# Patient Record
Sex: FEMALE | Race: BLACK OR AFRICAN AMERICAN | NOT HISPANIC OR LATINO | Employment: FULL TIME | URBAN - METROPOLITAN AREA
[De-identification: names, ages, dates, MRNs, and addresses within clinical notes are randomized per-mention and may not be internally consistent; named-entity substitution may affect disease eponyms.]

---

## 2017-01-30 ENCOUNTER — OFFICE VISIT (OUTPATIENT)
Dept: FAMILY MEDICINE | Facility: CLINIC | Age: 42
End: 2017-01-30
Payer: COMMERCIAL

## 2017-01-30 VITALS
BODY MASS INDEX: 40.04 KG/M2 | TEMPERATURE: 99 F | WEIGHT: 226 LBS | SYSTOLIC BLOOD PRESSURE: 120 MMHG | OXYGEN SATURATION: 98 % | DIASTOLIC BLOOD PRESSURE: 84 MMHG | HEART RATE: 88 BPM | HEIGHT: 63 IN | RESPIRATION RATE: 18 BRPM

## 2017-01-30 DIAGNOSIS — H00.011 HORDEOLUM EXTERNUM OF RIGHT UPPER EYELID: ICD-10-CM

## 2017-01-30 DIAGNOSIS — J30.9 ALLERGIC RHINITIS, CAUSE UNSPECIFIED: Primary | ICD-10-CM

## 2017-01-30 PROCEDURE — 99213 OFFICE O/P EST LOW 20 MIN: CPT | Mod: 25,S$GLB,, | Performed by: REGISTERED NURSE

## 2017-01-30 PROCEDURE — 3079F DIAST BP 80-89 MM HG: CPT | Mod: S$GLB,,, | Performed by: REGISTERED NURSE

## 2017-01-30 PROCEDURE — 99999 PR PBB SHADOW E&M-EST. PATIENT-LVL III: CPT | Mod: PBBFAC,,, | Performed by: REGISTERED NURSE

## 2017-01-30 PROCEDURE — 3074F SYST BP LT 130 MM HG: CPT | Mod: S$GLB,,, | Performed by: REGISTERED NURSE

## 2017-01-30 PROCEDURE — 1159F MED LIST DOCD IN RCRD: CPT | Mod: S$GLB,,, | Performed by: REGISTERED NURSE

## 2017-01-30 PROCEDURE — 96372 THER/PROPH/DIAG INJ SC/IM: CPT | Mod: S$GLB,,, | Performed by: REGISTERED NURSE

## 2017-01-30 RX ORDER — MONTELUKAST SODIUM 10 MG/1
10 TABLET ORAL NIGHTLY
Qty: 30 TABLET | Refills: 0 | Status: SHIPPED | OUTPATIENT
Start: 2017-01-30 | End: 2017-03-01

## 2017-01-30 RX ORDER — SULFACETAMIDE SODIUM 100 MG/ML
1 SOLUTION/ DROPS OPHTHALMIC
Qty: 15 ML | Refills: 0 | Status: SHIPPED | OUTPATIENT
Start: 2017-01-30 | End: 2020-03-31 | Stop reason: SDUPTHER

## 2017-01-30 RX ORDER — LEVOCETIRIZINE DIHYDROCHLORIDE 5 MG/1
5 TABLET, FILM COATED ORAL EVERY MORNING
Qty: 30 TABLET | Refills: 6 | Status: SHIPPED | OUTPATIENT
Start: 2017-01-30 | End: 2017-03-13

## 2017-01-30 RX ORDER — BETAMETHASONE SODIUM PHOSPHATE AND BETAMETHASONE ACETATE 3; 3 MG/ML; MG/ML
12 INJECTION, SUSPENSION INTRA-ARTICULAR; INTRALESIONAL; INTRAMUSCULAR; SOFT TISSUE
Status: COMPLETED | OUTPATIENT
Start: 2017-01-30 | End: 2017-01-30

## 2017-01-30 RX ADMIN — BETAMETHASONE SODIUM PHOSPHATE AND BETAMETHASONE ACETATE 12 MG: 3; 3 INJECTION, SUSPENSION INTRA-ARTICULAR; INTRALESIONAL; INTRAMUSCULAR; SOFT TISSUE at 04:01

## 2017-01-30 NOTE — MR AVS SNAPSHOT
[facility]  [address]  [place] [address]  Phone: [phone]                  Garret Jones   2017 3:45 PM   Office Visit    Description:  Female : 1975   Provider:  Jed Pinedo NP   Department:  [facility]           Reason for Visit     Nasal Congestion           Diagnoses this Visit        Comments    Allergic rhinitis, cause unspecified    -  Primary     Hordeolum externum of right upper eyelid                To Do List           Goals (5 Years of Data)     None       These Medications        Disp Refills Start End    levocetirizine (XYZAL) 5 MG tablet 30 tablet 6 2017    Take 1 tablet (5 mg total) by mouth every morning. - Oral    Pharmacy: [facility] [address] [place] [address] [place] [id number] [facility] 1 SO. Ph #: [phone]       montelukast (SINGULAIR) 10 mg tablet 30 tablet 0 2017 3/1/2017    Take 1 tablet (10 mg total) by mouth every evening. - Oral    Pharmacy: [facility] [address] [place] [address] [place] [id number] [facility] 1 SO. Ph #: [phone]       sulfacetamide sodium 10% (BLEPH-10) 10 % ophthalmic solution 15 mL 0 2017    Place 1 drop into both eyes every 3 (three) hours. - Both Eyes    Pharmacy: [address] [place] [id number] [facility] 1 SO. Ph #: [phone]         [facility] On Call     [facility] On Call Nurse Care Line -  Assistance  Registered nurses in the [facility] provide clinical advisement, health education, appointment booking, and other advisory services.  Call for this free service at [phone].             Medications           Message regarding Medications     Verify the changes and/or additions to your medication regime listed below are the same as discussed with your clinician today.  If any of these changes or additions are incorrect, please notify your healthcare provider.        START taking these NEW medications        Refills     montelukast (SINGULAIR) 10 mg tablet 0    Sig: Take 1 tablet (10 mg total) by mouth every evening.    Class: Normal    Route: Oral    sulfacetamide sodium 10% (BLEPH-10) 10 % ophthalmic solution 0    Sig: Place 1 drop into both eyes every 3 (three) hours.    Class: Normal    Route: Both Eyes      These medications were administered today        Dose Freq    betamethasone acetate-betamethasone sodium phosphate injection 12 mg 12 mg Clinic/HOD 1 time    Sig: Inject 2 mLs (12 mg total) into the muscle one time.    Class: Normal    Route: Intramuscular           Verify that the below list of medications is an accurate representation of the medications you are currently taking.  If none reported, the list may be blank. If incorrect, please contact your healthcare provider. Carry this list with you in case of emergency.           Current Medications     alprazolam (XANAX) 2 MG Tab Take 1 tablet (2 mg total) by mouth daily as needed for Anxiety.    benazepril-hydrochlorthiazide (LOTENSIN HCT) 10-12.5 mg tablet Take 1 tablet by mouth Daily.    canagliflozin (INVOKANA) 100 mg Tab Take 100 mg by mouth once daily.    ergocalciferol (ERGOCALCIFEROL) 50,000 unit Cap Take 50,000 Units by mouth every 7 days.    etodolac (LODINE) 400 MG tablet Take 1 tablet (400 mg total) by mouth 2 (two) times daily. As needed for pain with food    flunisolide 25 mcg, 0.025%, (NASALIDE) 25 mcg (0.025 %) Spry 2 sprays by Nasal route 2 (two) times daily.    ibuprofen (ADVIL,MOTRIN) 800 MG tablet Take 1 tablet (800 mg total) by mouth 3 (three) times daily with meals.    levocetirizine (XYZAL) 5 MG tablet Take 1 tablet (5 mg total) by mouth every morning.    levothyroxine (SYNTHROID) 100 MCG tablet Take 100 mcg by mouth once daily.    metaxalone (SKELAXIN) 800 MG tablet Take 1 tablet (800 mg total) by mouth 3 (three) times daily as needed for Pain. Caution on sedation    metoprolol succinate (TOPROL-XL) 50 MG 24 hr tablet Take 50 mg by mouth.     "sitagliptin (JANUVIA) 50 MG Tab Take 100 mg by mouth once daily.    montelukast (SINGULAIR) 10 mg tablet Take 1 tablet (10 mg total) by mouth every evening.    omeprazole (PRILOSEC) 40 MG capsule Take 1 capsule (40 mg total) by mouth every morning.    sulfacetamide sodium 10% (BLEPH-10) 10 % ophthalmic solution Place 1 drop into both eyes every 3 (three) hours.           Clinical Reference Information           Vital Signs - Last Recorded  Most recent update: 1/30/2017  3:47 PM by Latrice Graham MA    BP Pulse Temp Resp Ht Wt    120/84 88 98.8 °F (37.1 °C) (Tympanic) 18 5' 2.5" (1.588 m) 102.5 kg (225 lb 15.5 oz)    SpO2 BMI             98% 40.67 kg/m2         Blood Pressure          Most Recent Value    BP  120/84      Allergies as of 1/30/2017     Morphine    Amlodipine    Amoxicillin-pot Clavulanate    Codeine    Hydrocodone    Latex    Propoxyphene N-acetaminophen    Tuberculin Ppd      Immunizations Administered on Date of Encounter - 1/30/2017     None      "

## 2017-01-30 NOTE — PROGRESS NOTES
"Subjective:       Patient ID: Garret Jones is a 41 y.o. female.    Chief Complaint: Nasal Congestion    HPI     Garret is here today with c/o allergy issues over the past week.  Coricidin-HBP not helping.  Reports sweats, RN, NC, sneezing, PND, watery eyes and scratchy throat.  Reports stye to RT upper eyelid.    Review of Systems   Constitutional: Positive for diaphoresis. Negative for chills.   HENT: Positive for congestion, postnasal drip, rhinorrhea and sneezing. Negative for ear pain and sinus pressure.    Eyes: Positive for pain (stye to lid), discharge and itching. Negative for photophobia, redness and visual disturbance.   Respiratory: Negative.    Cardiovascular: Negative.    Allergic/Immunologic: Positive for environmental allergies.   Neurological: Negative.    Hematological: Negative for adenopathy.       Objective:         Vitals:    01/30/17 1544   BP: 120/84   Pulse: 88   Resp: 18   Temp: 98.8 °F (37.1 °C)   TempSrc: Tympanic   SpO2: 98%   Weight: 102.5 kg (225 lb 15.5 oz)   Height: 5' 2.5" (1.588 m)       Physical Exam   Constitutional: She is oriented to person, place, and time. She appears well-developed and well-nourished. No distress.   HENT:   Head: Normocephalic and atraumatic.   Right Ear: Tympanic membrane, external ear and ear canal normal.   Left Ear: Tympanic membrane, external ear and ear canal normal.   Nose: Mucosal edema and rhinorrhea (boggy, clear RN) present. Right sinus exhibits no maxillary sinus tenderness and no frontal sinus tenderness. Left sinus exhibits no maxillary sinus tenderness and no frontal sinus tenderness.   Mouth/Throat: No oropharyngeal exudate, posterior oropharyngeal edema or posterior oropharyngeal erythema (clear PND noted).   Eyes: EOM are normal. Pupils are equal, round, and reactive to light. Right eye exhibits discharge (clear B watery d/c, mild eye redness) and hordeolum (RT upper inner eyelid). Left eye exhibits discharge.   Neck: Normal range " of motion. Neck supple.   Cardiovascular: Normal rate, regular rhythm and normal heart sounds.    Pulmonary/Chest: Effort normal and breath sounds normal.   Lymphadenopathy:     She has no cervical adenopathy.   Neurological: She is alert and oriented to person, place, and time.   Skin: She is not diaphoretic.   Vitals reviewed.      Assessment:       1. Allergic rhinitis, cause unspecified    2. Hordeolum externum of right upper eyelid        Plan:       Garret was seen today for nasal congestion.    Diagnoses and all orders for this visit:    Allergic rhinitis, cause unspecified  -     betamethasone acetate-betamethasone sodium phosphate injection 12 mg; Inject 2 mLs (12 mg total) into the muscle one time.  -     levocetirizine (XYZAL) 5 MG tablet; Take 1 tablet (5 mg total) by mouth every morning.  -     montelukast (SINGULAIR) 10 mg tablet; Take 1 tablet (10 mg total) by mouth every evening.    Hordeolum externum of right upper eyelid  -     sulfacetamide sodium 10% (BLEPH-10) 10 % ophthalmic solution; Place 1 drop into both eyes every 3 (three) hours.      Follow-up in clinic as needed.

## 2017-03-13 ENCOUNTER — OFFICE VISIT (OUTPATIENT)
Dept: FAMILY MEDICINE | Facility: CLINIC | Age: 42
End: 2017-03-13
Payer: COMMERCIAL

## 2017-03-13 VITALS
HEIGHT: 62 IN | RESPIRATION RATE: 18 BRPM | TEMPERATURE: 98 F | SYSTOLIC BLOOD PRESSURE: 122 MMHG | BODY MASS INDEX: 42.07 KG/M2 | WEIGHT: 228.63 LBS | DIASTOLIC BLOOD PRESSURE: 80 MMHG | HEART RATE: 97 BPM

## 2017-03-13 DIAGNOSIS — J32.9 SINUSITIS, UNSPECIFIED CHRONICITY, UNSPECIFIED LOCATION: Primary | ICD-10-CM

## 2017-03-13 DIAGNOSIS — J30.9 ALLERGIC RHINITIS, CAUSE UNSPECIFIED: ICD-10-CM

## 2017-03-13 PROCEDURE — 99999 PR PBB SHADOW E&M-EST. PATIENT-LVL IV: CPT | Mod: PBBFAC,,, | Performed by: FAMILY MEDICINE

## 2017-03-13 PROCEDURE — 3074F SYST BP LT 130 MM HG: CPT | Mod: S$GLB,,, | Performed by: FAMILY MEDICINE

## 2017-03-13 PROCEDURE — 99214 OFFICE O/P EST MOD 30 MIN: CPT | Mod: S$GLB,,, | Performed by: FAMILY MEDICINE

## 2017-03-13 PROCEDURE — 1160F RVW MEDS BY RX/DR IN RCRD: CPT | Mod: S$GLB,,, | Performed by: FAMILY MEDICINE

## 2017-03-13 PROCEDURE — 3079F DIAST BP 80-89 MM HG: CPT | Mod: S$GLB,,, | Performed by: FAMILY MEDICINE

## 2017-03-13 RX ORDER — LEVOCETIRIZINE DIHYDROCHLORIDE 5 MG/1
5 TABLET, FILM COATED ORAL EVERY MORNING
Qty: 30 TABLET | Refills: 6 | Status: SHIPPED | OUTPATIENT
Start: 2017-03-13 | End: 2019-07-10

## 2017-03-13 RX ORDER — FLUTICASONE PROPIONATE 50 MCG
2 SPRAY, SUSPENSION (ML) NASAL DAILY
Qty: 16 G | Refills: 0 | Status: SHIPPED | OUTPATIENT
Start: 2017-03-13 | End: 2017-03-24

## 2017-03-13 RX ORDER — CLARITHROMYCIN 500 MG/1
500 TABLET, FILM COATED ORAL 2 TIMES DAILY
Qty: 20 TABLET | Refills: 0 | Status: SHIPPED | OUTPATIENT
Start: 2017-03-13 | End: 2017-03-23

## 2017-03-13 NOTE — PATIENT INSTRUCTIONS
Sinusitis (Antibiotic Treatment)    The sinuses are air-filled spaces within the bones of the face. They connect to the inside of the nose. Sinusitis is an inflammation of the tissue lining the sinus cavity. Sinus inflammation can occur during a cold. It can also be due to allergies to pollens and other particles in the air. Sinusitis can cause symptoms of sinus congestion and fullness. A sinus infection causes fever, headache and facial pain. There is often green or yellow drainage from the nose or into the back of the throat (post-nasal drip). You have been given antibiotics to treat this condition.  Home care:  · Take the full course of antibiotics as instructed. Do not stop taking them, even if you feel better.  · Drink plenty of water, hot tea, and other liquids. This may help thin mucus. It also may promote sinus drainage.  · Heat may help soothe painful areas of the face. Use a towel soaked in hot water. Or,  the shower and direct the hot spray onto your face. Using a vaporizer along with a menthol rub at night may also help.   · An expectorant containing guaifenesin may help thin the mucus and promote drainage from the sinuses.  · Over-the-counter decongestants may be used unless a similar medicine was prescribed. Nasal sprays work the fastest. Use one that contains phenylephrine or oxymetazoline. First blow the nose gently. Then use the spray. Do not use these medicines more often than directed on the label or symptoms may get worse. You may also use tablets containing pseudoephedrine. Avoid products that combine ingredients, because side effects may be increased. Read labels. You can also ask the pharmacist for help. (NOTE: Persons with high blood pressure should not use decongestants. They can raise blood pressure.)  · Over-the-counter antihistamines may help if allergies contributed to your sinusitis.    · Do not use nasal rinses or irrigation during an acute sinus infection, unless told to by  your health care provider. Rinsing may spread the infection to other sinuses.  · Use acetaminophen or ibuprofen to control pain, unless another pain medicine was prescribed. (If you have chronic liver or kidney disease or ever had a stomach ulcer, talk with your doctor before using these medicines. Aspirin should never be used in anyone under 18 years of age who is ill with a fever. It may cause severe liver damage.)  · Don't smoke. This can worsen symptoms.  Follow-up care  Follow up with your healthcare provider or our staff if you are not improving within the next week.  When to seek medical advice  Call your healthcare provider if any of these occur:  · Facial pain or headache becoming more severe  · Stiff neck  · Unusual drowsiness or confusion  · Swelling of the forehead or eyelids  · Vision problems, including blurred or double vision  · Fever of 100.4ºF (38ºC) or higher, or as directed by your healthcare provider  · Seizure  · Breathing problems  · Symptoms not resolving within 10 days  Date Last Reviewed: 4/13/2015  © 7806-9887 [employer]. [address] [place] [address], [place] [address]. All rights reserved. This information is not intended as a substitute for professional medical care. Always follow your healthcare professional's instructions.

## 2017-03-13 NOTE — PROGRESS NOTES
Subjective:       Patient ID: Garret Jones is a 41 y.o. female.    Chief Complaint: URI      HPI  Ms. Jones presents to clinic today for complaints of cold. She states this has been going on for almost 2 week. She has taken coricidin and it is not helping.   She states her daughter has been sick at home too. She reports she has had sinus pressure, sneezing, sore throat and ear pain.    Review of Systems   Constitutional: Positive for diaphoresis. Negative for chills and fever.   HENT: Positive for ear pain, rhinorrhea, sinus pressure, sneezing and sore throat.    Respiratory: Positive for cough.    Gastrointestinal: Negative for abdominal pain, diarrhea, nausea and vomiting.       Medication List with Changes/Refills   Current Medications    ALPRAZOLAM (XANAX) 2 MG TAB    Take 1 tablet (2 mg total) by mouth daily as needed for Anxiety.    BENAZEPRIL-HYDROCHLORTHIAZIDE (LOTENSIN HCT) 10-12.5 MG TABLET    Take 1 tablet by mouth Daily.    CANAGLIFLOZIN (INVOKANA) 100 MG TAB    Take 100 mg by mouth once daily.    ERGOCALCIFEROL (ERGOCALCIFEROL) 50,000 UNIT CAP    Take 50,000 Units by mouth every 7 days.    IBUPROFEN (ADVIL,MOTRIN) 800 MG TABLET    Take 1 tablet (800 mg total) by mouth 3 (three) times daily with meals.    LEVOTHYROXINE (SYNTHROID) 100 MCG TABLET    Take 100 mcg by mouth once daily.    METOPROLOL SUCCINATE (TOPROL-XL) 50 MG 24 HR TABLET    Take 50 mg by mouth.    OMEPRAZOLE (PRILOSEC) 40 MG CAPSULE    Take 1 capsule (40 mg total) by mouth every morning.    SITAGLIPTIN (JANUVIA) 50 MG TAB    Take 100 mg by mouth once daily.   Discontinued Medications    ETODOLAC (LODINE) 400 MG TABLET    Take 1 tablet (400 mg total) by mouth 2 (two) times daily. As needed for pain with food    FLUNISOLIDE 25 MCG, 0.025%, (NASALIDE) 25 MCG (0.025 %) SPRY    2 sprays by Nasal route 2 (two) times daily.    LEVOCETIRIZINE (XYZAL) 5 MG TABLET    Take 1 tablet (5 mg total) by mouth every morning.    METAXALONE  (SKELAXIN) 800 MG TABLET    Take 1 tablet (800 mg total) by mouth 3 (three) times daily as needed for Pain. Caution on sedation       Patient Active Problem List   Diagnosis    HTN (hypertension)    Hyperlipidemia    Allergic rhinitis    Hypothyroidism    Migraine headache    Iron deficiency anemia    Insulin resistance    Morbid obesity with BMI of 40.0-44.9, adult    Adjustment disorder with mixed anxiety and depressed mood         Objective:     Physical Exam   Constitutional: She is oriented to person, place, and time. She appears well-developed and well-nourished. No distress.   HENT:   Head: Normocephalic and atraumatic.   Right Ear: External ear normal.   Left Ear: External ear normal.   Mouth/Throat: Oropharynx is clear and moist. No oropharyngeal exudate.   Turbinate hypertrophy  Tm wnl   Eyes: EOM are normal. Right eye exhibits no discharge. Left eye exhibits no discharge.   Cardiovascular: Normal rate and regular rhythm.    Pulmonary/Chest: Effort normal and breath sounds normal. No respiratory distress. She has no wheezes.   Musculoskeletal: She exhibits no edema.   Neurological: She is alert and oriented to person, place, and time.   Skin: Skin is warm and dry. She is not diaphoretic. No erythema.   Psychiatric: She has a normal mood and affect.   Vitals reviewed.    Vitals:    03/13/17 0946   BP: 122/80   Pulse: 97   Resp: 18   Temp: 98.4 °F (36.9 °C)       Assessment/  PLAN     Sinusitis, unspecified chronicity, unspecified location  -     clarithromycin (BIAXIN) 500 MG tablet; Take 1 tablet (500 mg total) by mouth 2 (two) times daily.  Dispense: 20 tablet; Refill: 0  -     fluticasone (FLONASE) 50 mcg/actuation nasal spray; 2 sprays by Each Nare route once daily.  Dispense: 16 g; Refill: 0    Allergic rhinitis, cause unspecified  -     levocetirizine (XYZAL) 5 MG tablet; Take 1 tablet (5 mg total) by mouth every morning.  Dispense: 30 tablet; Refill: 6    plan as above   rtc prn   Continue  supportive care     Brennan Norris MD  [facility]

## 2017-03-13 NOTE — MR AVS SNAPSHOT
[facility]  [address] [facility]  [place] [address]  Phone: [phone]                  Garret Jones   3/13/2017 9:40 AM   Office Visit    Description:  Female : 1975   Provider:  Brennan Norris MD   Department:  [facility]           Reason for Visit     URI           Diagnoses this Visit        Comments    Sinusitis, unspecified chronicity, unspecified location    -  Primary     Allergic rhinitis, cause unspecified                To Do List           Goals (5 Years of Data)     None      Follow-Up and Disposition     Return if symptoms worsen or fail to improve.       These Medications        Disp Refills Start End    clarithromycin (BIAXIN) 500 MG tablet 20 tablet 0 3/13/2017 3/23/2017    Take 1 tablet (500 mg total) by mouth 2 (two) times daily. - Oral    Pharmacy: [facility] [address] [place] [address] [place] [id number] [facility] [address]. Ph #: [phone]       fluticasone (FLONASE) 50 mcg/actuation nasal spray 16 g 0 3/13/2017     2 sprays by Each Nare route once daily. - Each Nare    Pharmacy: [facility] [address] [place] [address] [place] [id number] [facility] [address]. Ph #: [phone]       levocetirizine (XYZAL) 5 MG tablet 30 tablet 6 3/13/2017 3/13/2018    Take 1 tablet (5 mg total) by mouth every morning. - Oral    Pharmacy: [facility] [address] [place] [address] [place] [id number] [facility] [address]. Ph #: [phone]         [facility] On Call     [facility] On Call Nurse Care Line - 24/7 Assistance  Registered nurses in the [facility] Center provide clinical advisement, health education, appointment booking, and other advisory services.  Call for this free service at [phone].             Medications           Message regarding Medications     Verify the changes and/or additions to your medication regime listed below are the same as discussed with your clinician today.  If any of these changes or additions are incorrect, please notify your healthcare provider.         START taking these NEW medications        Refills    clarithromycin (BIAXIN) 500 MG tablet 0    Sig: Take 1 tablet (500 mg total) by mouth 2 (two) times daily.    Class: Normal    Route: Oral    fluticasone (FLONASE) 50 mcg/actuation nasal spray 0    Si sprays by Each Nare route once daily.    Class: Normal    Route: Each Nare      STOP taking these medications     metaxalone (SKELAXIN) 800 MG tablet Take 1 tablet (800 mg total) by mouth 3 (three) times daily as needed for Pain. Caution on sedation    flunisolide 25 mcg, 0.025%, (NASALIDE) 25 mcg (0.025 %) Spry 2 sprays by Nasal route 2 (two) times daily.    etodolac (LODINE) 400 MG tablet Take 1 tablet (400 mg total) by mouth 2 (two) times daily. As needed for pain with food    omeprazole (PRILOSEC) 40 MG capsule Take 1 capsule (40 mg total) by mouth every morning.           Verify that the below list of medications is an accurate representation of the medications you are currently taking.  If none reported, the list may be blank. If incorrect, please contact your healthcare provider. Carry this list with you in case of emergency.           Current Medications     alprazolam (XANAX) 2 MG Tab Take 1 tablet (2 mg total) by mouth daily as needed for Anxiety.    benazepril-hydrochlorthiazide (LOTENSIN HCT) 10-12.5 mg tablet Take 1 tablet by mouth Daily.    canagliflozin (INVOKANA) 100 mg Tab Take 100 mg by mouth once daily.    ergocalciferol (ERGOCALCIFEROL) 50,000 unit Cap Take 50,000 Units by mouth every 7 days.    ibuprofen (ADVIL,MOTRIN) 800 MG tablet Take 1 tablet (800 mg total) by mouth 3 (three) times daily with meals.    levothyroxine (SYNTHROID) 100 MCG tablet Take 100 mcg by mouth once daily.    metoprolol succinate (TOPROL-XL) 50 MG 24 hr tablet Take 50 mg by mouth.    sitagliptin (JANUVIA) 50 MG Tab Take 100 mg by mouth once daily.    clarithromycin (BIAXIN) 500 MG tablet Take 1 tablet (500 mg total) by mouth 2 (two) times daily.    fluticasone  "(FLONASE) 50 mcg/actuation nasal spray 2 sprays by Each Nare route once daily.    levocetirizine (XYZAL) 5 MG tablet Take 1 tablet (5 mg total) by mouth every morning.           Clinical Reference Information           Your Vitals Were     BP Pulse Temp Resp Height Weight    122/80 97 98.4 °F (36.9 °C) (Tympanic) 18 5' 2" (1.575 m) 103.7 kg (228 lb 9.9 oz)    BMI                41.81 kg/m2          Blood Pressure          Most Recent Value    BP  122/80      Allergies as of 3/13/2017     Morphine    Amlodipine    Amoxicillin-pot Clavulanate    Codeine    Hydrocodone    Latex    Propoxyphene N-acetaminophen    Tuberculin Ppd      Immunizations Administered on Date of Encounter - 3/13/2017     None      Instructions      Sinusitis (Antibiotic Treatment)    The sinuses are air-filled spaces within the bones of the face. They connect to the inside of the nose. Sinusitis is an inflammation of the tissue lining the sinus cavity. Sinus inflammation can occur during a cold. It can also be due to allergies to pollens and other particles in the air. Sinusitis can cause symptoms of sinus congestion and fullness. A sinus infection causes fever, headache and facial pain. There is often green or yellow drainage from the nose or into the back of the throat (post-nasal drip). You have been given antibiotics to treat this condition.  Home care:  · Take the full course of antibiotics as instructed. Do not stop taking them, even if you feel better.  · Drink plenty of water, hot tea, and other liquids. This may help thin mucus. It also may promote sinus drainage.  · Heat may help soothe painful areas of the face. Use a towel soaked in hot water. Or,  the shower and direct the hot spray onto your face. Using a vaporizer along with a menthol rub at night may also help.   · An expectorant containing guaifenesin may help thin the mucus and promote drainage from the sinuses.  · Over-the-counter decongestants may be used unless a " similar medicine was prescribed. Nasal sprays work the fastest. Use one that contains phenylephrine or oxymetazoline. First blow the nose gently. Then use the spray. Do not use these medicines more often than directed on the label or symptoms may get worse. You may also use tablets containing pseudoephedrine. Avoid products that combine ingredients, because side effects may be increased. Read labels. You can also ask the pharmacist for help. (NOTE: Persons with high blood pressure should not use decongestants. They can raise blood pressure.)  · Over-the-counter antihistamines may help if allergies contributed to your sinusitis.    · Do not use nasal rinses or irrigation during an acute sinus infection, unless told to by your health care provider. Rinsing may spread the infection to other sinuses.  · Use acetaminophen or ibuprofen to control pain, unless another pain medicine was prescribed. (If you have chronic liver or kidney disease or ever had a stomach ulcer, talk with your doctor before using these medicines. Aspirin should never be used in anyone under 18 years of age who is ill with a fever. It may cause severe liver damage.)  · Don't smoke. This can worsen symptoms.  Follow-up care  Follow up with your healthcare provider or our staff if you are not improving within the next week.  When to seek medical advice  Call your healthcare provider if any of these occur:  · Facial pain or headache becoming more severe  · Stiff neck  · Unusual drowsiness or confusion  · Swelling of the forehead or eyelids  · Vision problems, including blurred or double vision  · Fever of 100.4ºF (38ºC) or higher, or as directed by your healthcare provider  · Seizure  · Breathing problems  · Symptoms not resolving within 10 days  Date Last Reviewed: 4/13/2015 © 2000-2016 [employer]. [address] [place] [address], [place] [address]. All rights reserved. This information is not intended as a substitute for professional medical  care. Always follow your healthcare professional's instructions.             Language Assistance Services     ATTENTION: Language assistance services are available, free of charge. Please call [phone].      ATENCIÓN: Si habdaniela cervantes, tiene a stover disposición servicios gratuitos de asistencia lingüística. Llame al [phone].     CHÚ Ý: N?u b?n nói Ti?ng Vi?t, có các d?ch v? h? tr? ngôn ng? mi?n phí dành cho b?n. G?i s? [phone].         [facility] complies with applicable Federal civil rights laws and does not discriminate on the basis of race, color, national origin, age, disability, or sex.

## 2017-03-24 ENCOUNTER — OFFICE VISIT (OUTPATIENT)
Dept: FAMILY MEDICINE | Facility: CLINIC | Age: 42
End: 2017-03-24
Payer: COMMERCIAL

## 2017-03-24 VITALS
TEMPERATURE: 98 F | SYSTOLIC BLOOD PRESSURE: 130 MMHG | WEIGHT: 226.88 LBS | OXYGEN SATURATION: 99 % | HEART RATE: 93 BPM | HEIGHT: 62 IN | DIASTOLIC BLOOD PRESSURE: 88 MMHG | BODY MASS INDEX: 41.75 KG/M2 | RESPIRATION RATE: 19 BRPM

## 2017-03-24 DIAGNOSIS — J30.9 ALLERGIC RHINITIS, CAUSE UNSPECIFIED: Primary | ICD-10-CM

## 2017-03-24 DIAGNOSIS — R21 FACIAL RASH: ICD-10-CM

## 2017-03-24 PROCEDURE — 99213 OFFICE O/P EST LOW 20 MIN: CPT | Mod: S$GLB,,, | Performed by: REGISTERED NURSE

## 2017-03-24 PROCEDURE — 3075F SYST BP GE 130 - 139MM HG: CPT | Mod: S$GLB,,, | Performed by: REGISTERED NURSE

## 2017-03-24 PROCEDURE — 1160F RVW MEDS BY RX/DR IN RCRD: CPT | Mod: S$GLB,,, | Performed by: REGISTERED NURSE

## 2017-03-24 PROCEDURE — 3079F DIAST BP 80-89 MM HG: CPT | Mod: S$GLB,,, | Performed by: REGISTERED NURSE

## 2017-03-24 PROCEDURE — 99999 PR PBB SHADOW E&M-EST. PATIENT-LVL IV: CPT | Mod: PBBFAC,,, | Performed by: REGISTERED NURSE

## 2017-03-24 RX ORDER — METHYLPREDNISOLONE 4 MG/1
TABLET ORAL
Qty: 1 PACKAGE | Refills: 0 | Status: SHIPPED | OUTPATIENT
Start: 2017-03-24 | End: 2017-08-09

## 2017-03-24 RX ORDER — TRIAMCINOLONE ACETONIDE 1 MG/ML
LOTION TOPICAL 2 TIMES DAILY
Qty: 60 ML | Refills: 0 | Status: SHIPPED | OUTPATIENT
Start: 2017-03-24 | End: 2018-07-12 | Stop reason: SDUPTHER

## 2017-03-24 NOTE — MR AVS SNAPSHOT
[facility]  [address] [facility]  [place] [address]  Phone: [phone]                  Garret Jones   3/24/2017 11:15 AM   Office Visit    Description:  Female : 1975   Provider:  Jed Pinedo NP   Department:  [facility]           Reason for Visit     Rash           Diagnoses this Visit        Comments    Allergic rhinitis, cause unspecified    -  Primary     Facial rash                To Do List           Goals (5 Years of Data)     None       These Medications        Disp Refills Start End    methylPREDNISolone (MEDROL DOSEPACK) 4 mg tablet 1 Package 0 3/24/2017     use as directed    Pharmacy: [facility] [address] [place] [address] [address] [facility] [address] Ph #: [phone]       triamcinolone acetonide 0.1% (KENALOG) 0.1 % Lotn 60 mL 0 3/24/2017 4/3/2017    Apply topically 2 (two) times daily. Do not use longer than 1 to 2 weeks - Topical (Top)    Pharmacy: [facility] [address] [place], [place] [address] [facility] [address]. Ph #: [phone]         [facility] On Call     [facility] On Call Nurse [facility] -  Assistance  Registered nurses in the [facility] provide clinical advisement, health education, appointment booking, and other advisory services.  Call for this free service at [phone].             Medications           Message regarding Medications     Verify the changes and/or additions to your medication regime listed below are the same as discussed with your clinician today.  If any of these changes or additions are incorrect, please notify your healthcare provider.        START taking these NEW medications        Refills    methylPREDNISolone (MEDROL DOSEPACK) 4 mg tablet 0    Sig: use as directed    Class: Normal    triamcinolone acetonide 0.1% (KENALOG) 0.1 % Lotn 0    Sig: Apply topically 2 (two) times daily. Do not use longer than 1 to 2 weeks    Class: Normal    Route: Topical (Top)      STOP taking  "these medications     fluticasone (FLONASE) 50 mcg/actuation nasal spray 2 sprays by Each Nare route once daily.           Verify that the below list of medications is an accurate representation of the medications you are currently taking.  If none reported, the list may be blank. If incorrect, please contact your healthcare provider. Carry this list with you in case of emergency.           Current Medications     alprazolam (XANAX) 2 MG Tab Take 1 tablet (2 mg total) by mouth daily as needed for Anxiety.    benazepril-hydrochlorthiazide (LOTENSIN HCT) 10-12.5 mg tablet Take 1 tablet by mouth Daily.    canagliflozin (INVOKANA) 100 mg Tab Take 100 mg by mouth once daily.    ergocalciferol (ERGOCALCIFEROL) 50,000 unit Cap Take 50,000 Units by mouth every 7 days.    ibuprofen (ADVIL,MOTRIN) 800 MG tablet Take 1 tablet (800 mg total) by mouth 3 (three) times daily with meals.    levocetirizine (XYZAL) 5 MG tablet Take 1 tablet (5 mg total) by mouth every morning.    levothyroxine (SYNTHROID) 100 MCG tablet Take 100 mcg by mouth once daily.    metoprolol succinate (TOPROL-XL) 50 MG 24 hr tablet Take 50 mg by mouth.    sitagliptin (JANUVIA) 50 MG Tab Take 100 mg by mouth once daily.    methylPREDNISolone (MEDROL DOSEPACK) 4 mg tablet use as directed    triamcinolone acetonide 0.1% (KENALOG) 0.1 % Lotn Apply topically 2 (two) times daily. Do not use longer than 1 to 2 weeks           Clinical Reference Information           Your Vitals Were     BP Pulse Temp Resp    130/88 (BP Location: Left arm, Patient Position: Sitting, BP Method: Manual) 93 97.8 °F (36.6 °C) (Tympanic) 19    Height Weight SpO2 BMI    5' 2" (1.575 m) 102.9 kg (226 lb 13.7 oz) 99% 41.49 kg/m2      Blood Pressure          Most Recent Value    BP  130/88      Allergies as of 3/24/2017     Morphine    Amlodipine    Amoxicillin-pot Clavulanate    Codeine    Hydrocodone    Latex    Propoxyphene N-acetaminophen    Tuberculin Ppd      Immunizations Administered " on Date of Encounter - 3/24/2017     None      Language Assistance Services     ATTENTION: Language assistance services are available, free of charge. Please call [phone].      ATENCIÓN: Si habdaniela cervantes, tiene a stover disposición servicios gratuitos de asistencia lingüística. Llame al [phone].     CHÚ Ý: N?u b?n nói Ti?ng Vi?t, có các d?ch v? h? tr? ngôn ng? mi?n phí dành cho b?n. G?i s? [phone].         [facility] complies with applicable Federal civil rights laws and does not discriminate on the basis of race, color, national origin, age, disability, or sex.

## 2017-03-26 NOTE — PROGRESS NOTES
"Subjective:       Patient ID: Garret Jones is a 41 y.o. female.    Chief Complaint: Rash    HPI     Garret is here today with c/o skin rash to face x few days.  Reports exposure to fresh-cut grass which has also gotten her allergies acting up now.  Taking Xyzal daily as ordered.  Reports small bumps to face with itching.  Does report watery eyes w/ sneezing.    Review of Systems   Constitutional: Negative for chills and fever.   HENT: Positive for postnasal drip, rhinorrhea and sneezing. Negative for congestion, ear pain, nosebleeds and sinus pressure.    Eyes: Positive for discharge, redness and itching. Negative for photophobia, pain and visual disturbance.   Respiratory: Negative.    Cardiovascular: Negative.    Skin: Positive for rash.   Allergic/Immunologic: Positive for environmental allergies.   Neurological: Negative.    Hematological: Negative for adenopathy.       Objective:       Vitals:    03/24/17 1115   BP: 130/88   BP Location: Left arm   Patient Position: Sitting   BP Method: Manual   Pulse: 93   Resp: 19   Temp: 97.8 °F (36.6 °C)   TempSrc: Tympanic   SpO2: 99%   Weight: 102.9 kg (226 lb 13.7 oz)   Height: 5' 2" (1.575 m)       Physical Exam   Constitutional: She is oriented to person, place, and time. She appears well-developed and well-nourished.   HENT:   Head: Normocephalic and atraumatic.   Right Ear: Tympanic membrane, external ear and ear canal normal.   Left Ear: Tympanic membrane, external ear and ear canal normal.   Nose: Rhinorrhea (clear watery d/c) present. No mucosal edema. No epistaxis. Right sinus exhibits no maxillary sinus tenderness and no frontal sinus tenderness. Left sinus exhibits no maxillary sinus tenderness and no frontal sinus tenderness.   Mouth/Throat: Oropharynx is clear and moist.   Eyes: Pupils are equal, round, and reactive to light. Right eye exhibits discharge (clear B watery eye d/c, mild eye redness w/ puffiness to eyelids, shiners noted). Left eye " exhibits discharge.   Cardiovascular: Normal rate, regular rhythm and normal heart sounds.    Pulmonary/Chest: Effort normal and breath sounds normal.   Lymphadenopathy:     She has no cervical adenopathy.   Neurological: She is alert and oriented to person, place, and time.   Skin: Rash (facial rash noted, no swelling, small generalized bumps) noted.   Vitals reviewed.      Assessment:       1. Allergic rhinitis, cause unspecified    2. Facial rash        Plan:         Garret was seen today for rash.    Diagnoses and all orders for this visit:    Allergic rhinitis, cause unspecified  -     methylPREDNISolone (MEDROL DOSEPACK) 4 mg tablet; use as directed    Facial rash  -     methylPREDNISolone (MEDROL DOSEPACK) 4 mg tablet; use as directed  -     triamcinolone acetonide 0.1% (KENALOG) 0.1 % Lotn; Apply topically 2 (two) times daily. Do not use longer than 1 to 2 weeks      Continue Xyzal, nasal spray if needed.  Follow-up in clinic as needed.

## 2017-04-05 ENCOUNTER — TELEPHONE (OUTPATIENT)
Dept: FAMILY MEDICINE | Facility: CLINIC | Age: 42
End: 2017-04-05

## 2017-04-05 NOTE — TELEPHONE ENCOUNTER
----- Message from Ahmet Haskins sent at 4/5/2017 10:04 AM CDT -----  Contact: pt  She's calling in regards to a new RX for Diflucan, [facility] pharmacy in [place], please advise, [phone] (home)

## 2017-04-06 ENCOUNTER — TELEPHONE (OUTPATIENT)
Dept: FAMILY MEDICINE | Facility: CLINIC | Age: 42
End: 2017-04-06

## 2017-04-06 RX ORDER — FLUCONAZOLE 150 MG/1
150 TABLET ORAL DAILY
Qty: 1 TABLET | Refills: 0 | Status: SHIPPED | OUTPATIENT
Start: 2017-04-06 | End: 2017-04-07

## 2017-04-06 NOTE — TELEPHONE ENCOUNTER
----- Message from Ellie Dey sent at 4/6/2017 10:22 AM CDT -----  Contact: self [phone]  States that pharm has not received rx for diflucan. Pt uses     [facility] [address] [address]  [address]  [place] [address]  Phone: [phone] Fax: [phone]    Please call back at [phone]//thank you acc

## 2017-04-13 ENCOUNTER — OFFICE VISIT (OUTPATIENT)
Dept: URGENT CARE | Facility: CLINIC | Age: 42
End: 2017-04-13
Payer: COMMERCIAL

## 2017-04-13 VITALS
BODY MASS INDEX: 41.78 KG/M2 | WEIGHT: 221.31 LBS | TEMPERATURE: 98 F | HEIGHT: 61 IN | DIASTOLIC BLOOD PRESSURE: 84 MMHG | HEART RATE: 69 BPM | SYSTOLIC BLOOD PRESSURE: 112 MMHG | RESPIRATION RATE: 19 BRPM | OXYGEN SATURATION: 98 %

## 2017-04-13 DIAGNOSIS — R09.81 SINUS CONGESTION: ICD-10-CM

## 2017-04-13 DIAGNOSIS — J32.9 SINUSITIS, UNSPECIFIED CHRONICITY, UNSPECIFIED LOCATION: Primary | ICD-10-CM

## 2017-04-13 DIAGNOSIS — R05.9 COUGH: ICD-10-CM

## 2017-04-13 PROCEDURE — 96372 THER/PROPH/DIAG INJ SC/IM: CPT | Mod: S$GLB,,, | Performed by: NURSE PRACTITIONER

## 2017-04-13 PROCEDURE — 1160F RVW MEDS BY RX/DR IN RCRD: CPT | Mod: S$GLB,,, | Performed by: NURSE PRACTITIONER

## 2017-04-13 PROCEDURE — 99999 PR PBB SHADOW E&M-EST. PATIENT-LVL IV: CPT | Mod: PBBFAC,,, | Performed by: NURSE PRACTITIONER

## 2017-04-13 PROCEDURE — 3079F DIAST BP 80-89 MM HG: CPT | Mod: S$GLB,,, | Performed by: NURSE PRACTITIONER

## 2017-04-13 PROCEDURE — 3074F SYST BP LT 130 MM HG: CPT | Mod: S$GLB,,, | Performed by: NURSE PRACTITIONER

## 2017-04-13 PROCEDURE — 99214 OFFICE O/P EST MOD 30 MIN: CPT | Mod: 25,S$GLB,, | Performed by: NURSE PRACTITIONER

## 2017-04-13 RX ORDER — METHYLPREDNISOLONE ACETATE 80 MG/ML
80 INJECTION, SUSPENSION INTRA-ARTICULAR; INTRALESIONAL; INTRAMUSCULAR; SOFT TISSUE
Status: COMPLETED | OUTPATIENT
Start: 2017-04-13 | End: 2017-04-13

## 2017-04-13 RX ORDER — CLARITHROMYCIN 500 MG/1
500 TABLET, FILM COATED ORAL EVERY 12 HOURS
Qty: 20 TABLET | Refills: 0 | Status: SHIPPED | OUTPATIENT
Start: 2017-04-13 | End: 2017-08-09

## 2017-04-13 RX ORDER — PROMETHAZINE HYDROCHLORIDE AND DEXTROMETHORPHAN HYDROBROMIDE 6.25; 15 MG/5ML; MG/5ML
5 SYRUP ORAL NIGHTLY PRN
Qty: 118 ML | Refills: 0 | Status: SHIPPED | OUTPATIENT
Start: 2017-04-13 | End: 2017-04-23

## 2017-04-13 RX ORDER — FLUTICASONE PROPIONATE 50 MCG
2 SPRAY, SUSPENSION (ML) NASAL DAILY
Qty: 16 G | Refills: 0 | Status: SHIPPED | OUTPATIENT
Start: 2017-04-13 | End: 2017-05-13

## 2017-04-13 RX ORDER — LORATADINE 10 MG/1
10 TABLET ORAL DAILY
Qty: 30 TABLET | Refills: 0 | Status: SHIPPED | OUTPATIENT
Start: 2017-04-13 | End: 2018-04-04

## 2017-04-13 RX ADMIN — METHYLPREDNISOLONE ACETATE 80 MG: 80 INJECTION, SUSPENSION INTRA-ARTICULAR; INTRALESIONAL; INTRAMUSCULAR; SOFT TISSUE at 07:04

## 2017-04-13 NOTE — MR AVS SNAPSHOT
[facility]  [address] [facility] [place] [address]  Phone: [phone]  Fax: [phone]                  Garret Jones   2017 7:20 PM   Office Visit    Description:  Female : 1975   Provider:  Michael Bishop NP   Department:  [facility]           Reason for Visit     Eye Pain     Sore Throat           Diagnoses this Visit        Comments    Sinusitis, unspecified chronicity, unspecified location    -  Primary     Cough         Sinus congestion                To Do List           Goals (5 Years of Data)     None      Follow-Up and Disposition     Return if symptoms worsen or fail to improve.       These Medications        Disp Refills Start End    clarithromycin (BIAXIN) 500 MG tablet 20 tablet 0 2017     Take 1 tablet (500 mg total) by mouth every 12 (twelve) hours. - Oral    Pharmacy: [facility] [address] [place] [address] [address] [facility] Ph #: [phone]       fluticasone (FLONASE) 50 mcg/actuation nasal spray 16 g 0 2017    2 sprays by Each Nare route once daily. - Each Nare    Pharmacy: [facility] [address] [place] [address] [address] [facility] [address] Ph #: [phone]       loratadine (CLARITIN) 10 mg tablet 30 tablet 0 2017    Take 1 tablet (10 mg total) by mouth once daily. - Oral    Pharmacy: [facility] [address] [place] [address] [address]  [address] Ph #: [phone]       promethazine-dextromethorphan (PROMETHAZINE-DM) 6.25-15 mg/5 mL Syrp 118 mL 0 2017    Take 5 mLs by mouth nightly as needed. - Oral    Pharmacy: [facility] [address] [place] [address] [address] [facility] Ph #: [phone]         [facility] On Call     [facility] On Call Nurse Care Line - 24/ Assistance  Unless otherwise directed by your provider, please contact [facility], our nurse care line that is available for / assistance.     Registered nurses in the [facility] provide: appointment scheduling,  clinical advisement, health education, and other advisory services.  Call: [phone] (toll free)               Medications           Message regarding Medications     Verify the changes and/or additions to your medication regime listed below are the same as discussed with your clinician today.  If any of these changes or additions are incorrect, please notify your healthcare provider.        START taking these NEW medications        Refills    clarithromycin (BIAXIN) 500 MG tablet 0    Sig: Take 1 tablet (500 mg total) by mouth every 12 (twelve) hours.    Class: Normal    Route: Oral    fluticasone (FLONASE) 50 mcg/actuation nasal spray 0    Si sprays by Each Nare route once daily.    Class: Normal    Route: Each Nare    loratadine (CLARITIN) 10 mg tablet 0    Sig: Take 1 tablet (10 mg total) by mouth once daily.    Class: Normal    Route: Oral    promethazine-dextromethorphan (PROMETHAZINE-DM) 6.25-15 mg/5 mL Syrp 0    Sig: Take 5 mLs by mouth nightly as needed.    Class: Normal    Route: Oral      These medications were administered today        Dose Freq    methylPREDNISolone acetate injection 80 mg 80 mg Clinic/HOD 1 time    Sig: Inject 1 mL (80 mg total) into the muscle one time.    Class: Normal    Route: Intramuscular           Verify that the below list of medications is an accurate representation of the medications you are currently taking.  If none reported, the list may be blank. If incorrect, please contact your healthcare provider. Carry this list with you in case of emergency.           Current Medications     alprazolam (XANAX) 2 MG Tab Take 1 tablet (2 mg total) by mouth daily as needed for Anxiety.    benazepril-hydrochlorthiazide (LOTENSIN HCT) 10-12.5 mg tablet Take 1 tablet by mouth Daily.    canagliflozin (INVOKANA) 100 mg Tab Take 100 mg by mouth once daily.    ergocalciferol (ERGOCALCIFEROL) 50,000 unit Cap Take 50,000 Units by mouth every 7 days.    ibuprofen (ADVIL,MOTRIN) 800 MG  "tablet Take 1 tablet (800 mg total) by mouth 3 (three) times daily with meals.    levocetirizine (XYZAL) 5 MG tablet Take 1 tablet (5 mg total) by mouth every morning.    levothyroxine (SYNTHROID) 100 MCG tablet Take 100 mcg by mouth once daily.    methylPREDNISolone (MEDROL DOSEPACK) 4 mg tablet use as directed    metoprolol succinate (TOPROL-XL) 50 MG 24 hr tablet Take 50 mg by mouth.    sitagliptin (JANUVIA) 50 MG Tab Take 100 mg by mouth once daily.    clarithromycin (BIAXIN) 500 MG tablet Take 1 tablet (500 mg total) by mouth every 12 (twelve) hours.    fluticasone (FLONASE) 50 mcg/actuation nasal spray 2 sprays by Each Nare route once daily.    loratadine (CLARITIN) 10 mg tablet Take 1 tablet (10 mg total) by mouth once daily.    promethazine-dextromethorphan (PROMETHAZINE-DM) 6.25-15 mg/5 mL Syrp Take 5 mLs by mouth nightly as needed.    triamcinolone acetonide 0.1% (KENALOG) 0.1 % Lotn Apply topically 2 (two) times daily. Do not use longer than 1 to 2 weeks           Clinical Reference Information           Your Vitals Were     BP Pulse Temp Resp    112/84 (BP Location: Right arm, Patient Position: Sitting, BP Method: Manual) 69 98.2 °F (36.8 °C) (Tympanic) 19    Height Weight SpO2 BMI    5' 1" (1.549 m) 100.4 kg (221 lb 5.5 oz) 98% 41.82 kg/m2      Blood Pressure          Most Recent Value    BP  112/84      Allergies as of 4/13/2017     Morphine    Amlodipine    Amoxicillin-pot Clavulanate    Codeine    Hydrocodone    Latex    Propoxyphene N-acetaminophen    Tuberculin Ppd      Immunizations Administered on Date of Encounter - 4/13/2017     None      Instructions      Sinusitis (Antibiotic Treatment)    The sinuses are air-filled spaces within the bones of the face. They connect to the inside of the nose. Sinusitis is an inflammation of the tissue lining the sinus cavity. Sinus inflammation can occur during a cold. It can also be due to allergies to pollens and other particles in the air. Sinusitis can " cause symptoms of sinus congestion and fullness. A sinus infection causes fever, headache and facial pain. There is often green or yellow drainage from the nose or into the back of the throat (post-nasal drip). You have been given antibiotics to treat this condition.  Home care:  · Take the full course of antibiotics as instructed. Do not stop taking them, even if you feel better.  · Drink plenty of water, hot tea, and other liquids. This may help thin mucus. It also may promote sinus drainage.  · Heat may help soothe painful areas of the face. Use a towel soaked in hot water. Or,  the shower and direct the hot spray onto your face. Using a vaporizer along with a menthol rub at night may also help.   · An expectorant containing guaifenesin may help thin the mucus and promote drainage from the sinuses.  · Over-the-counter decongestants may be used unless a similar medicine was prescribed. Nasal sprays work the fastest. Use one that contains phenylephrine or oxymetazoline. First blow the nose gently. Then use the spray. Do not use these medicines more often than directed on the label or symptoms may get worse. You may also use tablets containing pseudoephedrine. Avoid products that combine ingredients, because side effects may be increased. Read labels. You can also ask the pharmacist for help. (NOTE: Persons with high blood pressure should not use decongestants. They can raise blood pressure.)  · Over-the-counter antihistamines may help if allergies contributed to your sinusitis.    · Do not use nasal rinses or irrigation during an acute sinus infection, unless told to by your health care provider. Rinsing may spread the infection to other sinuses.  · Use acetaminophen or ibuprofen to control pain, unless another pain medicine was prescribed. (If you have chronic liver or kidney disease or ever had a stomach ulcer, talk with your doctor before using these medicines. Aspirin should never be used in anyone  under 18 years of age who is ill with a fever. It may cause severe liver damage.)  · Don't smoke. This can worsen symptoms.  Follow-up care  Follow up with your healthcare provider or our staff if you are not improving within the next week.  When to seek medical advice  Call your healthcare provider if any of these occur:  · Facial pain or headache becoming more severe  · Stiff neck  · Unusual drowsiness or confusion  · Swelling of the forehead or eyelids  · Vision problems, including blurred or double vision  · Fever of 100.4ºF (38ºC) or higher, or as directed by your healthcare provider  · Seizure  · Breathing problems  · Symptoms not resolving within 10 days  Date Last Reviewed: 4/13/2015  © 1813-8273 [employer] [address] [place] [address], [place] [address]. All rights reserved. This information is not intended as a substitute for professional medical care. Always follow your healthcare professional's instructions.             Language Assistance Services     ATTENTION: Language assistance services are available, free of charge. Please call [phone].      ATENCIÓN: Si jerla helen, tiene a stover disposición servicios gratuitos de asistencia lingüística. Llame al [phone].     [facility] Ý: N?u b?n nói Ti?ng Vi?t, có các d?ch v? h? tr? ngôn ng? mi?n phí dành cho b?n. G?i s? [phone].         [facility] complies with applicable Federal civil rights laws and does not discriminate on the basis of race, color, national origin, age, disability, or sex.

## 2017-04-14 NOTE — PATIENT INSTRUCTIONS
Sinusitis (Antibiotic Treatment)    The sinuses are air-filled spaces within the bones of the face. They connect to the inside of the nose. Sinusitis is an inflammation of the tissue lining the sinus cavity. Sinus inflammation can occur during a cold. It can also be due to allergies to pollens and other particles in the air. Sinusitis can cause symptoms of sinus congestion and fullness. A sinus infection causes fever, headache and facial pain. There is often green or yellow drainage from the nose or into the back of the throat (post-nasal drip). You have been given antibiotics to treat this condition.  Home care:  · Take the full course of antibiotics as instructed. Do not stop taking them, even if you feel better.  · Drink plenty of water, hot tea, and other liquids. This may help thin mucus. It also may promote sinus drainage.  · Heat may help soothe painful areas of the face. Use a towel soaked in hot water. Or,  the shower and direct the hot spray onto your face. Using a vaporizer along with a menthol rub at night may also help.   · An expectorant containing guaifenesin may help thin the mucus and promote drainage from the sinuses.  · Over-the-counter decongestants may be used unless a similar medicine was prescribed. Nasal sprays work the fastest. Use one that contains phenylephrine or oxymetazoline. First blow the nose gently. Then use the spray. Do not use these medicines more often than directed on the label or symptoms may get worse. You may also use tablets containing pseudoephedrine. Avoid products that combine ingredients, because side effects may be increased. Read labels. You can also ask the pharmacist for help. (NOTE: Persons with high blood pressure should not use decongestants. They can raise blood pressure.)  · Over-the-counter antihistamines may help if allergies contributed to your sinusitis.    · Do not use nasal rinses or irrigation during an acute sinus infection, unless told to by  your health care provider. Rinsing may spread the infection to other sinuses.  · Use acetaminophen or ibuprofen to control pain, unless another pain medicine was prescribed. (If you have chronic liver or kidney disease or ever had a stomach ulcer, talk with your doctor before using these medicines. Aspirin should never be used in anyone under 18 years of age who is ill with a fever. It may cause severe liver damage.)  · Don't smoke. This can worsen symptoms.  Follow-up care  Follow up with your healthcare provider or our staff if you are not improving within the next week.  When to seek medical advice  Call your healthcare provider if any of these occur:  · Facial pain or headache becoming more severe  · Stiff neck  · Unusual drowsiness or confusion  · Swelling of the forehead or eyelids  · Vision problems, including blurred or double vision  · Fever of 100.4ºF (38ºC) or higher, or as directed by your healthcare provider  · Seizure  · Breathing problems  · Symptoms not resolving within 10 days  Date Last Reviewed: 4/13/2015  © 5981-6666 [employer] [address] [place] [address], [place] [address]. All rights reserved. This information is not intended as a substitute for professional medical care. Always follow your healthcare professional's instructions.

## 2017-04-14 NOTE — PROGRESS NOTES
Pt received 80 mg of methylprednisolone acetate to right ventrogluteal. Pt tolerated well. Advised pt to stay 15 minutes after injection. Pt verbalized understanding.

## 2017-04-14 NOTE — PROGRESS NOTES
Subjective:       Patient ID: Garret Jones is a 41 y.o. female.    Chief Complaint: Eye Pain and Sore Throat    HPI Comments: Pt is a 41 year old female to clinic today with complaints of cough, congestion, ST, and sinus pressure that began 1.5 days ago.     Sore Throat    This is a new problem. The current episode started 1 to 4 weeks ago (1.5 weeks). The problem has been gradually worsening. The pain is worse on the right side. There has been no fever. The pain is at a severity of 7/10. The pain is severe. Associated symptoms include congestion, coughing, headaches, a plugged ear sensation and trouble swallowing. Pertinent negatives include no abdominal pain, diarrhea, drooling, ear discharge, ear pain, hoarse voice, neck pain, shortness of breath, stridor, swollen glands or vomiting. She has had no exposure to strep or mono. She has tried nothing for the symptoms.     Review of Systems   Constitutional: Negative for chills, diaphoresis, fatigue and fever.   HENT: Positive for congestion, sinus pressure, sore throat and trouble swallowing. Negative for drooling, ear discharge, ear pain, hoarse voice, postnasal drip, rhinorrhea and sneezing.    Eyes: Negative for pain.   Respiratory: Positive for cough. Negative for chest tightness, shortness of breath, wheezing and stridor.    Cardiovascular: Negative for chest pain and palpitations.   Gastrointestinal: Negative for abdominal pain, diarrhea, nausea and vomiting.   Genitourinary: Negative for dysuria.   Musculoskeletal: Negative for back pain, myalgias and neck pain.   Skin: Negative for rash.   Neurological: Positive for headaches. Negative for dizziness and light-headedness.       Objective:      Physical Exam   Constitutional: She is oriented to person, place, and time. She appears well-developed and well-nourished. No distress.   HENT:   Head: Normocephalic.   Right Ear: Tympanic membrane, external ear and ear canal normal. No tenderness. Tympanic  membrane is not bulging.   Left Ear: Tympanic membrane, external ear and ear canal normal. No tenderness. Tympanic membrane is not bulging.   Nose: Mucosal edema present. No rhinorrhea. Right sinus exhibits maxillary sinus tenderness and frontal sinus tenderness. Left sinus exhibits maxillary sinus tenderness and frontal sinus tenderness.   Mouth/Throat: Uvula is midline and mucous membranes are normal. No oropharyngeal exudate, posterior oropharyngeal edema or posterior oropharyngeal erythema.   Eyes: Conjunctivae and EOM are normal. Pupils are equal, round, and reactive to light.   Neck: Normal range of motion. Neck supple.   Cardiovascular: Normal rate, regular rhythm, S1 normal, S2 normal and normal heart sounds.  Exam reveals no gallop and no friction rub.    No murmur heard.  Pulmonary/Chest: Effort normal and breath sounds normal. No accessory muscle usage. No apnea, no tachypnea and no bradypnea. No respiratory distress. She has no decreased breath sounds. She has no wheezes. She has no rhonchi. She has no rales.   Abdominal: Bowel sounds are normal.   Musculoskeletal: Normal range of motion.   Lymphadenopathy:        Head (right side): No submental, no submandibular and no tonsillar adenopathy present.        Head (left side): No submental, no submandibular and no tonsillar adenopathy present.     She has no cervical adenopathy.   Neurological: She is alert and oriented to person, place, and time.   Skin: Skin is warm and dry. No rash noted. She is not diaphoretic.   Psychiatric: She has a normal mood and affect. Her speech is normal and behavior is normal.   Nursing note and vitals reviewed.      Assessment:       1. Sinusitis, unspecified chronicity, unspecified location    2. Cough    3. Sinus congestion        Plan:   Sinusitis, unspecified chronicity, unspecified location  -     clarithromycin (BIAXIN) 500 MG tablet; Take 1 tablet (500 mg total) by mouth every 12 (twelve) hours.  Dispense: 20 tablet;  Refill: 0  -     methylPREDNISolone acetate injection 80 mg; Inject 1 mL (80 mg total) into the muscle one time.    Cough  -     promethazine-dextromethorphan (PROMETHAZINE-DM) 6.25-15 mg/5 mL Syrp; Take 5 mLs by mouth nightly as needed.  Dispense: 118 mL; Refill: 0    Sinus congestion  -     fluticasone (FLONASE) 50 mcg/actuation nasal spray; 2 sprays by Each Nare route once daily.  Dispense: 16 g; Refill: 0  -     loratadine (CLARITIN) 10 mg tablet; Take 1 tablet (10 mg total) by mouth once daily.  Dispense: 30 tablet; Refill: 0        · Rest and increase fluids.   · May apply warm compresses as needed.   · Saline nasal spray or saline irrigation (Neti pot) to loosen nasal congestion.  · Flonase or Nasacort to reduce inflammation in the sinus cavities.  · Take antibiotics exactly as prescribed. Make sure to complete the entire course of antibiotics even if you start feeling better. This will prevent recurrence of your infection and bacterial resistance.   · Do not drive, drink alcohol, or take any other sedating medications or substances while taking cough syrup.   · Follow up with your primary care provider or with ENT if not improved within a few days or sooner for any new or worsening symptoms.   · Go to the ER for any fever that does not improve with Tylenol/Ibuprofen, neck stiffness, rash, severe headache, vision changes, shortness of breath, chest pain, severe facial pain or swelling, or for any other new and concerning symptoms.

## 2017-05-08 RX ORDER — FLUCONAZOLE 150 MG/1
TABLET ORAL
Qty: 1 TABLET | Refills: 0 | Status: SHIPPED | OUTPATIENT
Start: 2017-05-08 | End: 2017-08-09

## 2017-05-08 NOTE — TELEPHONE ENCOUNTER
----- Message from Ayaan Nolasco sent at 5/8/2017  1:31 PM CDT -----  The pt would like to receive a call back from the staff and can be reached at [phone].

## 2017-05-08 NOTE — TELEPHONE ENCOUNTER
Pt states she took doxycline for acne. And changed soap and would like something for vaginal itching.

## 2017-05-08 NOTE — TELEPHONE ENCOUNTER
----- Message from Linda Elmore sent at 5/8/2017  9:24 AM CDT -----  Contact: Pt  Pt is requesting to speak to the nurse. Pt is having some issues with vaginal irritation. Pls call pt back at [phone].

## 2017-07-18 DIAGNOSIS — F43.23 ADJUSTMENT DISORDER WITH MIXED ANXIETY AND DEPRESSED MOOD: ICD-10-CM

## 2017-07-18 RX ORDER — ALPRAZOLAM 2 MG/1
2 TABLET ORAL DAILY PRN
Qty: 30 TABLET | Refills: 0 | Status: SHIPPED | OUTPATIENT
Start: 2017-07-18 | End: 2017-09-25 | Stop reason: SDUPTHER

## 2017-07-18 NOTE — TELEPHONE ENCOUNTER
----- Message from Cassidy Gonzales sent at 7/18/2017  9:53 AM CDT -----  Contact: pt  The pt request a return call, no additional info given, pt can be reached at [phone]///[id number]

## 2017-07-19 ENCOUNTER — TELEPHONE (OUTPATIENT)
Dept: FAMILY MEDICINE | Facility: CLINIC | Age: 42
End: 2017-07-19

## 2017-07-19 NOTE — TELEPHONE ENCOUNTER
----- Message from Fina Regan sent at 7/19/2017  9:45 AM CDT -----  Contact: pt  Pt state went to [facility] also called and was told no prescription was received,pt would like for you to resend,please. Pt states going to the training meeting leave a message..[phone] (please notify when sent)         .  [facility] - [facility]  [address]  [place] [address]  Phone: [phone] Fax: [phone]

## 2017-08-09 ENCOUNTER — OFFICE VISIT (OUTPATIENT)
Dept: FAMILY MEDICINE | Facility: CLINIC | Age: 42
End: 2017-08-09
Payer: COMMERCIAL

## 2017-08-09 VITALS
TEMPERATURE: 99 F | SYSTOLIC BLOOD PRESSURE: 104 MMHG | RESPIRATION RATE: 17 BRPM | OXYGEN SATURATION: 99 % | WEIGHT: 220.44 LBS | HEART RATE: 83 BPM | HEIGHT: 61 IN | DIASTOLIC BLOOD PRESSURE: 84 MMHG | BODY MASS INDEX: 41.62 KG/M2

## 2017-08-09 DIAGNOSIS — J32.9 SINUSITIS, UNSPECIFIED CHRONICITY, UNSPECIFIED LOCATION: Primary | ICD-10-CM

## 2017-08-09 PROCEDURE — 3079F DIAST BP 80-89 MM HG: CPT | Mod: S$GLB,,, | Performed by: REGISTERED NURSE

## 2017-08-09 PROCEDURE — 3008F BODY MASS INDEX DOCD: CPT | Mod: S$GLB,,, | Performed by: REGISTERED NURSE

## 2017-08-09 PROCEDURE — 99213 OFFICE O/P EST LOW 20 MIN: CPT | Mod: S$GLB,,, | Performed by: REGISTERED NURSE

## 2017-08-09 PROCEDURE — 99999 PR PBB SHADOW E&M-EST. PATIENT-LVL IV: CPT | Mod: PBBFAC,,, | Performed by: REGISTERED NURSE

## 2017-08-09 PROCEDURE — 3074F SYST BP LT 130 MM HG: CPT | Mod: S$GLB,,, | Performed by: REGISTERED NURSE

## 2017-08-09 RX ORDER — FLUCONAZOLE 150 MG/1
150 TABLET ORAL DAILY
Qty: 1 TABLET | Refills: 0 | Status: SHIPPED | OUTPATIENT
Start: 2017-08-09 | End: 2017-08-10

## 2017-08-09 RX ORDER — DOXYCYCLINE 100 MG/1
100 CAPSULE ORAL 2 TIMES DAILY
Qty: 20 CAPSULE | Refills: 0 | Status: SHIPPED | OUTPATIENT
Start: 2017-08-09 | End: 2017-08-19

## 2017-08-10 NOTE — PROGRESS NOTES
"Subjective:       Patient ID: Garret Jones is a 41 y.o. female.    Chief Complaint: Sinus Problem      HPI    Garret has been having sinus issues x 1-1/2 weeks.  Reports HA, sinus pressure, eye pain, RN, NC and PND.  Reports productive cough, voice changes, sweats and chills.  Ears have been popping w/ increased pressure.  OTC meds not helping.      Review of Systems   Constitutional: Positive for chills and diaphoresis.   HENT: Positive for congestion, ear pain, postnasal drip, rhinorrhea, sinus pressure, sore throat and voice change.    Eyes: Positive for pain. Negative for photophobia and visual disturbance.   Respiratory: Positive for cough. Negative for shortness of breath.    Cardiovascular: Negative.    Neurological: Positive for headaches. Negative for weakness and light-headedness.   Hematological: Negative for adenopathy.         Patient Active Problem List   Diagnosis    HTN (hypertension)    Hyperlipidemia    Allergic rhinitis    Hypothyroidism    Migraine headache    Iron deficiency anemia    Insulin resistance    Morbid obesity with BMI of 40.0-44.9, adult    Adjustment disorder with mixed anxiety and depressed mood         Objective:     Vitals:    08/09/17 1454   BP: 104/84   BP Location: Left arm   Patient Position: Sitting   Pulse: 83   Resp: 17   Temp: 98.5 °F (36.9 °C)   TempSrc: Tympanic   SpO2: 99%   Weight: 100 kg (220 lb 7.4 oz)   Height: 5' 1" (1.549 m)       Physical Exam   Constitutional: She is oriented to person, place, and time. She appears well-developed and well-nourished.   HENT:   Head: Normocephalic and atraumatic.   Right Ear: Tympanic membrane is bulging. Tympanic membrane is not injected, not perforated, not erythematous and not retracted.   Left Ear: Tympanic membrane is bulging. Tympanic membrane is not injected, not perforated, not erythematous and not retracted.   Nose: Mucosal edema and rhinorrhea present. Right sinus exhibits frontal sinus tenderness. " Right sinus exhibits no maxillary sinus tenderness. Left sinus exhibits frontal sinus tenderness. Left sinus exhibits no maxillary sinus tenderness.   Mouth/Throat: Oropharynx is clear and moist and mucous membranes are normal.   Eyes: Conjunctivae and EOM are normal. Pupils are equal, round, and reactive to light.   Cardiovascular: Normal rate, regular rhythm and normal heart sounds.    Pulmonary/Chest: Effort normal and breath sounds normal.   Lymphadenopathy:     She has no cervical adenopathy.   Neurological: She is alert and oriented to person, place, and time.   Vitals reviewed.        Medication List with Changes/Refills   New Medications    DOXYCYCLINE (MONODOX) 100 MG CAPSULE    Take 1 capsule (100 mg total) by mouth 2 (two) times daily.    FLUCONAZOLE (DIFLUCAN) 150 MG TAB    Take 1 tablet (150 mg total) by mouth once daily.   Current Medications    ALPRAZOLAM (XANAX) 2 MG TAB    Take 1 tablet (2 mg total) by mouth daily as needed.    BENAZEPRIL-HYDROCHLORTHIAZIDE (LOTENSIN HCT) 10-12.5 MG TABLET    Take 1 tablet by mouth Daily.    CANAGLIFLOZIN (INVOKANA) 100 MG TAB    Take 100 mg by mouth once daily.    ERGOCALCIFEROL (ERGOCALCIFEROL) 50,000 UNIT CAP    Take 50,000 Units by mouth every 7 days.    IBUPROFEN (ADVIL,MOTRIN) 800 MG TABLET    Take 1 tablet (800 mg total) by mouth 3 (three) times daily with meals.    LEVOCETIRIZINE (XYZAL) 5 MG TABLET    Take 1 tablet (5 mg total) by mouth every morning.    LEVOTHYROXINE (SYNTHROID) 100 MCG TABLET    Take 100 mcg by mouth once daily.    LORATADINE (CLARITIN) 10 MG TABLET    Take 1 tablet (10 mg total) by mouth once daily.    METOPROLOL SUCCINATE (TOPROL-XL) 50 MG 24 HR TABLET    Take 50 mg by mouth.    SITAGLIPTIN (JANUVIA) 50 MG TAB    Take 100 mg by mouth once daily.    TRIAMCINOLONE ACETONIDE 0.1% (KENALOG) 0.1 % LOTN    Apply topically 2 (two) times daily. Do not use longer than 1 to 2 weeks   Discontinued Medications    CLARITHROMYCIN (BIAXIN) 500 MG  TABLET    Take 1 tablet (500 mg total) by mouth every 12 (twelve) hours.    FLUCONAZOLE (DIFLUCAN) 150 MG TAB    TAKE ONE TABLET BY MOUTH AS A ONE-TIME DOSE    METHYLPREDNISOLONE (MEDROL DOSEPACK) 4 MG TABLET    use as directed           Diagnosis       1. Sinusitis, unspecified chronicity, unspecified location          Assessment/ Plan     Sinusitis, unspecified chronicity, unspecified location  -     doxycycline (MONODOX) 100 MG capsule; Take 1 capsule (100 mg total) by mouth 2 (two) times daily.  Dispense: 20 capsule; Refill: 0    Other orders  -     fluconazole (DIFLUCAN) 150 MG Tab; Take 1 tablet (150 mg total) by mouth once daily.  Dispense: 1 tablet; Refill: 0        Symptomatic care, rest, fluids, hydration.  Yeast medication given per patient request due to antibiotics being ordered today.  Follow-up in clinic as needed.      ELIJAH Serra  [facility]

## 2017-09-11 ENCOUNTER — OFFICE VISIT (OUTPATIENT)
Dept: FAMILY MEDICINE | Facility: CLINIC | Age: 42
End: 2017-09-11
Payer: COMMERCIAL

## 2017-09-11 VITALS
BODY MASS INDEX: 40.91 KG/M2 | SYSTOLIC BLOOD PRESSURE: 110 MMHG | WEIGHT: 216.69 LBS | TEMPERATURE: 98 F | DIASTOLIC BLOOD PRESSURE: 78 MMHG | OXYGEN SATURATION: 98 % | HEART RATE: 78 BPM | RESPIRATION RATE: 16 BRPM | HEIGHT: 61 IN

## 2017-09-11 DIAGNOSIS — G47.00 INSOMNIA, UNSPECIFIED TYPE: Primary | ICD-10-CM

## 2017-09-11 PROCEDURE — 3008F BODY MASS INDEX DOCD: CPT | Mod: S$GLB,,, | Performed by: REGISTERED NURSE

## 2017-09-11 PROCEDURE — 3078F DIAST BP <80 MM HG: CPT | Mod: S$GLB,,, | Performed by: REGISTERED NURSE

## 2017-09-11 PROCEDURE — 99213 OFFICE O/P EST LOW 20 MIN: CPT | Mod: S$GLB,,, | Performed by: REGISTERED NURSE

## 2017-09-11 PROCEDURE — 99999 PR PBB SHADOW E&M-EST. PATIENT-LVL IV: CPT | Mod: PBBFAC,,, | Performed by: REGISTERED NURSE

## 2017-09-11 PROCEDURE — 3074F SYST BP LT 130 MM HG: CPT | Mod: S$GLB,,, | Performed by: REGISTERED NURSE

## 2017-09-11 RX ORDER — ZOLPIDEM TARTRATE 10 MG/1
10 TABLET ORAL NIGHTLY PRN
Qty: 30 TABLET | Refills: 0 | Status: SHIPPED | OUTPATIENT
Start: 2017-09-11 | End: 2017-10-18 | Stop reason: SDUPTHER

## 2017-09-11 NOTE — PROGRESS NOTES
"Subjective:       Patient ID: Garret Jones is a 41 y.o. female.    Chief Complaint: Insomnia      HPI    Garret is here today to obtain refill in Ambien.  She has been out of her sleep medication x 1 week now.  She has not been able to sleep w/out Ambien, has used OTC meds such as Tylenol-PM but has not helped.  In fact, the OTC medication gives her the "hangover" feeling the next day with increased grogginess.  She also does not get enough hours of sleep, up all night.  Ambien allows her to sleep all night long, wakes up next AM feeling refreshed.  Denies side effects from Ambien.      Review of Systems  See HPI.      Patient Active Problem List   Diagnosis    HTN (hypertension)    Hyperlipidemia    Allergic rhinitis    Hypothyroidism    Migraine headache    Iron deficiency anemia    Insulin resistance    Morbid obesity with BMI of 40.0-44.9, adult    Adjustment disorder with mixed anxiety and depressed mood    Insomnia         Objective:     Vitals:    09/11/17 1638   BP: 110/78   BP Location: Left arm   Patient Position: Sitting   BP Method: Large (Manual)   Pulse: 78   Resp: 16   Temp: 98.4 °F (36.9 °C)   TempSrc: Tympanic   SpO2: 98%   Weight: 98.3 kg (216 lb 11.4 oz)   Height: 5' 1" (1.549 m)       Physical Exam   Constitutional: She is oriented to person, place, and time. She appears well-developed and well-nourished. No distress.   Neurological: She is alert and oriented to person, place, and time.   Skin: She is not diaphoretic.   Vitals reviewed.        Medication List with Changes/Refills   Current Medications    ALPRAZOLAM (XANAX) 2 MG TAB    Take 1 tablet (2 mg total) by mouth daily as needed.    BENAZEPRIL-HYDROCHLORTHIAZIDE (LOTENSIN HCT) 10-12.5 MG TABLET    Take 1 tablet by mouth Daily.    CANAGLIFLOZIN (INVOKANA) 100 MG TAB    Take 100 mg by mouth once daily.    ERGOCALCIFEROL (ERGOCALCIFEROL) 50,000 UNIT CAP    Take 50,000 Units by mouth every 7 days.    IBUPROFEN " (ADVIL,MOTRIN) 800 MG TABLET    Take 1 tablet (800 mg total) by mouth 3 (three) times daily with meals.    LEVOCETIRIZINE (XYZAL) 5 MG TABLET    Take 1 tablet (5 mg total) by mouth every morning.    LEVOTHYROXINE (SYNTHROID) 100 MCG TABLET    Take 100 mcg by mouth once daily.    LORATADINE (CLARITIN) 10 MG TABLET    Take 1 tablet (10 mg total) by mouth once daily.    METOPROLOL SUCCINATE (TOPROL-XL) 50 MG 24 HR TABLET    Take 50 mg by mouth.    SITAGLIPTIN (JANUVIA) 50 MG TAB    Take 100 mg by mouth once daily.    TRIAMCINOLONE ACETONIDE 0.1% (KENALOG) 0.1 % LOTN    Apply topically 2 (two) times daily. Do not use longer than 1 to 2 weeks   Changed and/or Refilled Medications    Modified Medication Previous Medication    ZOLPIDEM (AMBIEN) 10 MG TAB zolpidem (AMBIEN) 10 mg Tab       Take 1 tablet (10 mg total) by mouth nightly as needed.    Take 1 tablet (10 mg total) by mouth every evening.           Diagnosis       1. Insomnia, unspecified type          Assessment/ Plan     Insomnia, unspecified type  -     zolpidem (AMBIEN) 10 mg Tab; Take 1 tablet (10 mg total) by mouth nightly as needed.  Dispense: 30 tablet; Refill: 0        Medication refilled, take as directed.  Follow-up in clinic for annual wellness exam, overdue.      ELIJAH Serra  [facility]

## 2017-09-25 ENCOUNTER — TELEPHONE (OUTPATIENT)
Dept: FAMILY MEDICINE | Facility: CLINIC | Age: 42
End: 2017-09-25

## 2017-09-25 DIAGNOSIS — F43.23 ADJUSTMENT DISORDER WITH MIXED ANXIETY AND DEPRESSED MOOD: ICD-10-CM

## 2017-09-25 RX ORDER — ALPRAZOLAM 0.5 MG/1
0.5 TABLET ORAL DAILY PRN
Qty: 30 TABLET | Refills: 0 | Status: SHIPPED | OUTPATIENT
Start: 2017-09-25 | End: 2018-09-11

## 2017-09-25 NOTE — TELEPHONE ENCOUNTER
----- Message from Jenny Norris sent at 9/25/2017  7:42 AM CDT -----  Pt would like you to call in the lower dosage of xanax for her. Pt uses [facility] in [place]. Pt can be reach at [phone].

## 2017-09-25 NOTE — TELEPHONE ENCOUNTER
Pt would like you to change her script to the 0.5mg of xanax. She says the script she has now is to strong.

## 2017-10-18 DIAGNOSIS — G47.00 INSOMNIA, UNSPECIFIED TYPE: ICD-10-CM

## 2017-10-18 RX ORDER — FLUCONAZOLE 150 MG/1
150 TABLET ORAL DAILY
Qty: 1 TABLET | Refills: 0 | Status: SHIPPED | OUTPATIENT
Start: 2017-10-18 | End: 2017-10-19

## 2017-10-18 RX ORDER — ZOLPIDEM TARTRATE 10 MG/1
10 TABLET ORAL NIGHTLY PRN
Qty: 30 TABLET | Refills: 0 | Status: SHIPPED | OUTPATIENT
Start: 2017-10-18 | End: 2017-11-17 | Stop reason: SDUPTHER

## 2017-10-18 NOTE — TELEPHONE ENCOUNTER
----- Message from Portia Riley sent at 10/18/2017  9:41 AM CDT -----  Contact: pt  Call pt regarding a script.   ..[phone] (ptdf)

## 2017-11-17 DIAGNOSIS — G47.00 INSOMNIA, UNSPECIFIED TYPE: ICD-10-CM

## 2017-11-17 RX ORDER — ZOLPIDEM TARTRATE 10 MG/1
10 TABLET ORAL NIGHTLY PRN
Qty: 30 TABLET | Refills: 0 | Status: SHIPPED | OUTPATIENT
Start: 2017-11-17 | End: 2017-12-18 | Stop reason: SDUPTHER

## 2017-11-17 NOTE — TELEPHONE ENCOUNTER
----- Message from Mirella Delgadillo sent at 11/17/2017  3:17 PM CST -----  Pt at [phone]//states is calling to get a refill of med/Ambien//uses//[facility] in [place]//please call//[place]

## 2017-12-18 DIAGNOSIS — G47.00 INSOMNIA, UNSPECIFIED TYPE: ICD-10-CM

## 2017-12-18 RX ORDER — ZOLPIDEM TARTRATE 10 MG/1
10 TABLET ORAL NIGHTLY PRN
Qty: 30 TABLET | Refills: 0 | Status: SHIPPED | OUTPATIENT
Start: 2017-12-18 | End: 2017-12-20 | Stop reason: SDUPTHER

## 2017-12-18 NOTE — TELEPHONE ENCOUNTER
----- Message from Mirella Delgadillo sent at 12/18/2017 12:20 PM CST -----  Pt at [phone]//states she is needing to have med refilled//meds are Ambien////// Diflucan//and Boxy cycline 100mg//uses//[facility] in [place]//please call///thanks/[facility]

## 2017-12-20 PROBLEM — E55.9 VITAMIN D DEFICIENCY DISEASE: Status: ACTIVE | Noted: 2017-12-20

## 2018-03-12 PROBLEM — G35 MULTIPLE SCLEROSIS: Status: ACTIVE | Noted: 2018-03-12

## 2018-03-21 ENCOUNTER — TELEPHONE (OUTPATIENT)
Dept: FAMILY MEDICINE | Facility: CLINIC | Age: 43
End: 2018-03-21

## 2018-03-21 NOTE — TELEPHONE ENCOUNTER
----- Message from Ellie Dey sent at 3/21/2018  8:04 AM CDT -----  Contact: self [phone]  States that she would like to speak to nurse. Would not say what it is regarding. Please call back at [phone]//thank you acc

## 2018-03-27 ENCOUNTER — OFFICE VISIT (OUTPATIENT)
Dept: FAMILY MEDICINE | Facility: CLINIC | Age: 43
End: 2018-03-27
Payer: COMMERCIAL

## 2018-03-27 VITALS
RESPIRATION RATE: 17 BRPM | WEIGHT: 233 LBS | DIASTOLIC BLOOD PRESSURE: 88 MMHG | OXYGEN SATURATION: 98 % | SYSTOLIC BLOOD PRESSURE: 136 MMHG | TEMPERATURE: 98 F | HEIGHT: 61 IN | BODY MASS INDEX: 43.99 KG/M2 | HEART RATE: 89 BPM

## 2018-03-27 DIAGNOSIS — J30.89 ALLERGIC RHINITIS DUE TO OTHER ALLERGIC TRIGGER, UNSPECIFIED CHRONICITY, UNSPECIFIED SEASONALITY: Primary | ICD-10-CM

## 2018-03-27 DIAGNOSIS — L73.9 FOLLICULITIS: ICD-10-CM

## 2018-03-27 PROCEDURE — 99999 PR PBB SHADOW E&M-EST. PATIENT-LVL IV: CPT | Mod: PBBFAC,,, | Performed by: REGISTERED NURSE

## 2018-03-27 PROCEDURE — 99213 OFFICE O/P EST LOW 20 MIN: CPT | Mod: S$GLB,,, | Performed by: REGISTERED NURSE

## 2018-03-27 PROCEDURE — 3079F DIAST BP 80-89 MM HG: CPT | Mod: CPTII,S$GLB,, | Performed by: REGISTERED NURSE

## 2018-03-27 PROCEDURE — 3075F SYST BP GE 130 - 139MM HG: CPT | Mod: CPTII,S$GLB,, | Performed by: REGISTERED NURSE

## 2018-03-27 RX ORDER — MINERAL OIL
180 ENEMA (ML) RECTAL DAILY
Qty: 30 TABLET | Refills: 6 | Status: SHIPPED | OUTPATIENT
Start: 2018-03-27 | End: 2019-06-29 | Stop reason: SDUPTHER

## 2018-03-27 RX ORDER — METHYLPREDNISOLONE 4 MG/1
TABLET ORAL
Qty: 1 PACKAGE | Refills: 0 | Status: SHIPPED | OUTPATIENT
Start: 2018-03-27 | End: 2018-04-04 | Stop reason: ALTCHOICE

## 2018-03-27 NOTE — PROGRESS NOTES
"Subjective:       Patient ID: Garret Jones is a 42 y.o. female.    Chief Complaint: Allergies      HPI    Garret is here today with c/o allergies flaring over past few days.  Due to leave on cruise next week.  Coricidin-HBP for allergies not helping.  Reports mild sneezing, PND, mild cough with puffy itchy eyes.  Denies fever/chills.    Reports hair bumps after getting a Brazilian wax recently.  She has been using witch hazel to area.  Denies drainage, redness or pain.      Review of Systems   Constitutional: Negative for chills and fever.   HENT: Positive for congestion, facial swelling (eyelids), postnasal drip and sneezing. Negative for ear pain, rhinorrhea, sinus pressure and sore throat.    Eyes: Positive for itching. Negative for photophobia, pain, discharge, redness and visual disturbance.   Respiratory: Positive for cough. Negative for shortness of breath and wheezing.    Cardiovascular: Negative.    Skin: Positive for rash.   Allergic/Immunologic: Positive for environmental allergies.   Neurological: Negative.    Hematological: Negative for adenopathy.         Patient Active Problem List   Diagnosis    HTN (hypertension)    Hyperlipidemia    Allergic rhinitis    Hypothyroidism    Migraine headache    Iron deficiency anemia    Insulin resistance    Morbid obesity with BMI of 40.0-44.9, adult    Adjustment disorder with mixed anxiety and depressed mood    Insomnia    Vitamin D deficiency disease    Multiple sclerosis       Past medical, social and family histories have been reviewed today.      Objective:     Vitals:    03/27/18 1323   BP: 136/88   BP Location: Left arm   Patient Position: Sitting   BP Method: Large (Manual)   Pulse: 89   Resp: 17   Temp: 97.9 °F (36.6 °C)   TempSrc: Tympanic   SpO2: 98%   Weight: 105.7 kg (233 lb 0.4 oz)   Height: 5' 1" (1.549 m)       Physical Exam   Constitutional: She is oriented to person, place, and time. She appears well-developed and " well-nourished.   HENT:   Head: Normocephalic and atraumatic.   Right Ear: Tympanic membrane normal.   Left Ear: Tympanic membrane normal.   Nose: Mucosal edema and rhinorrhea (boggy, clear RN) present. Right sinus exhibits no maxillary sinus tenderness and no frontal sinus tenderness. Left sinus exhibits no maxillary sinus tenderness and no frontal sinus tenderness.   Mouth/Throat: No oropharyngeal exudate, posterior oropharyngeal edema or posterior oropharyngeal erythema (clear PND noted).   Eyes: EOM are normal. Pupils are equal, round, and reactive to light. Right eye exhibits discharge (clear B watery d/c, puffy eyelids, mild eye redness with shiners noted). Left eye exhibits discharge.   Cardiovascular: Normal rate and regular rhythm.    Pulmonary/Chest: Effort normal and breath sounds normal.   Lymphadenopathy:     She has no cervical adenopathy.   Neurological: She is alert and oriented to person, place, and time.   Skin:        Vitals reviewed.        Medication List with Changes/Refills   Current Medications    ALPRAZOLAM (XANAX) 0.5 MG TABLET    Take 1 tablet (0.5 mg total) by mouth daily as needed.    BENAZEPRIL-HYDROCHLORTHIAZIDE (LOTENSIN HCT) 20-12.5 MG PER TABLET    TAKE TWO TABLETS BY MOUTH ONCE DAILY    CANAGLIFLOZIN (INVOKANA) 100 MG TAB    Take 1 tablet (100 mg total) by mouth once daily.    ERGOCALCIFEROL (ERGOCALCIFEROL) 50,000 UNIT CAP    Take 1 capsule (50,000 Units total) by mouth every 7 days.    IBUPROFEN (ADVIL,MOTRIN) 800 MG TABLET    Take 1 tablet (800 mg total) by mouth 3 (three) times daily with meals.    LEVOCETIRIZINE (XYZAL) 5 MG TABLET    Take 1 tablet (5 mg total) by mouth every morning.    LEVOTHYROXINE (SYNTHROID) 200 MCG TABLET    Take 1 tablet (200 mcg total) by mouth once daily.    LORATADINE (CLARITIN) 10 MG TABLET    Take 1 tablet (10 mg total) by mouth once daily.    METOPROLOL SUCCINATE (TOPROL-XL) 50 MG 24 HR TABLET    Take 1 tablet (50 mg total) by mouth once daily.     SITAGLIPTIN (JANUVIA) 100 MG TAB    Take 1 tablet (100 mg total) by mouth once daily.    TRIAMCINOLONE ACETONIDE 0.1% (KENALOG) 0.1 % LOTN    Apply topically 2 (two) times daily. Do not use longer than 1 to 2 weeks    ZOLPIDEM (AMBIEN) 10 MG TAB    Take 1 tablet (10 mg total) by mouth nightly as needed.           Diagnosis       1. Allergic rhinitis due to other allergic trigger, unspecified chronicity, unspecified seasonality    2. Folliculitis          Assessment/ Plan     Allergic rhinitis due to other allergic trigger, unspecified chronicity, unspecified seasonality        -     This problem is currently not controlled, patient having flare-up of allergy symptoms.        -     Medication and treatment plan discussed.  -     methylPREDNISolone (MEDROL DOSEPACK) 4 mg tablet; use as directed  Dispense: 1 Package; Refill: 0  -     fexofenadine (ALLEGRA) 180 MG tablet; Take 1 tablet (180 mg total) by mouth once daily.  Dispense: 30 tablet; Refill: 6    Folliculitis        -     Local warm compresses TID to QID.        -     Skin care discussed.          ELIJAH Serra  [facility]

## 2018-04-04 ENCOUNTER — OFFICE VISIT (OUTPATIENT)
Dept: FAMILY MEDICINE | Facility: CLINIC | Age: 43
End: 2018-04-04
Payer: COMMERCIAL

## 2018-04-04 VITALS
TEMPERATURE: 98 F | HEIGHT: 61 IN | SYSTOLIC BLOOD PRESSURE: 102 MMHG | DIASTOLIC BLOOD PRESSURE: 79 MMHG | BODY MASS INDEX: 43.91 KG/M2 | HEART RATE: 86 BPM | OXYGEN SATURATION: 100 % | RESPIRATION RATE: 16 BRPM | WEIGHT: 232.56 LBS

## 2018-04-04 DIAGNOSIS — J30.89 ALLERGIC RHINITIS DUE TO OTHER ALLERGIC TRIGGER, UNSPECIFIED CHRONICITY, UNSPECIFIED SEASONALITY: Primary | ICD-10-CM

## 2018-04-04 PROCEDURE — 99999 PR PBB SHADOW E&M-EST. PATIENT-LVL IV: CPT | Mod: PBBFAC,,, | Performed by: REGISTERED NURSE

## 2018-04-04 PROCEDURE — 96372 THER/PROPH/DIAG INJ SC/IM: CPT | Mod: S$GLB,,, | Performed by: FAMILY MEDICINE

## 2018-04-04 PROCEDURE — 3078F DIAST BP <80 MM HG: CPT | Mod: CPTII,S$GLB,, | Performed by: REGISTERED NURSE

## 2018-04-04 PROCEDURE — 99213 OFFICE O/P EST LOW 20 MIN: CPT | Mod: 25,S$GLB,, | Performed by: REGISTERED NURSE

## 2018-04-04 PROCEDURE — 3074F SYST BP LT 130 MM HG: CPT | Mod: CPTII,S$GLB,, | Performed by: REGISTERED NURSE

## 2018-04-04 RX ORDER — BETAMETHASONE SODIUM PHOSPHATE AND BETAMETHASONE ACETATE 3; 3 MG/ML; MG/ML
12 INJECTION, SUSPENSION INTRA-ARTICULAR; INTRALESIONAL; INTRAMUSCULAR; SOFT TISSUE
Status: COMPLETED | OUTPATIENT
Start: 2018-04-04 | End: 2018-04-04

## 2018-04-04 RX ORDER — AZELASTINE 1 MG/ML
1-2 SPRAY, METERED NASAL 2 TIMES DAILY
Qty: 30 ML | Refills: 1 | Status: SHIPPED | OUTPATIENT
Start: 2018-04-04 | End: 2018-08-31

## 2018-04-04 RX ORDER — DIMETHYL FUMARATE 240 MG/1
240 CAPSULE ORAL 2 TIMES DAILY
COMMUNITY
End: 2019-04-13

## 2018-04-04 RX ADMIN — BETAMETHASONE SODIUM PHOSPHATE AND BETAMETHASONE ACETATE 12 MG: 3; 3 INJECTION, SUSPENSION INTRA-ARTICULAR; INTRALESIONAL; INTRAMUSCULAR; SOFT TISSUE at 01:04

## 2018-04-04 NOTE — PROGRESS NOTES
"Subjective:       Patient ID: Garret Jones is a 42 y.o. female.    Chief Complaint: Sore Throat      HPI    Garret returns today to discuss allergies.  She was last seen in the office on 3/27/18, my chart note is reviewed today.  She reports doing much on better on Allegra, finishing up the Medrol dosepack today.  She is still having a PND and throat irritation, but overall symptoms have improved.  She wants to be checked b/c leaving for cruise tomorrow.      Review of Systems   Constitutional: Negative for chills and fever.   HENT: Positive for postnasal drip and sore throat. Negative for congestion, ear pain, facial swelling, rhinorrhea, sinus pain and sneezing.    Eyes: Negative.    Respiratory: Negative.    Cardiovascular: Negative.    Allergic/Immunologic: Positive for environmental allergies.   Neurological: Negative.          Patient Active Problem List   Diagnosis    HTN (hypertension)    Hyperlipidemia    Allergic rhinitis    Hypothyroidism    Migraine headache    Iron deficiency anemia    Insulin resistance    Morbid obesity with BMI of 40.0-44.9, adult    Adjustment disorder with mixed anxiety and depressed mood    Insomnia    Vitamin D deficiency disease    Multiple sclerosis       Past medical, social and family histories have been reviewed today.      Objective:     Vitals:    04/04/18 1315   BP: 102/79   BP Location: Left arm   Patient Position: Sitting   BP Method: Small (Manual)   Pulse: 86   Resp: 16   Temp: 97.7 °F (36.5 °C)   TempSrc: Tympanic   SpO2: 100%   Weight: 105.5 kg (232 lb 9.4 oz)   Height: 5' 1" (1.549 m)       Physical Exam   Constitutional: She is oriented to person, place, and time. She appears well-developed and well-nourished.   HENT:   Head: Normocephalic and atraumatic.   Right Ear: Tympanic membrane normal.   Left Ear: Tympanic membrane normal.   Nose: Nose normal. Right sinus exhibits no maxillary sinus tenderness and no frontal sinus tenderness. Left " sinus exhibits no maxillary sinus tenderness and no frontal sinus tenderness.   Mouth/Throat: No oropharyngeal exudate, posterior oropharyngeal edema or posterior oropharyngeal erythema (clear PND noted).   Eyes: Conjunctivae and EOM are normal. Pupils are equal, round, and reactive to light.   Cardiovascular: Normal rate and regular rhythm.    Pulmonary/Chest: Effort normal and breath sounds normal.   Lymphadenopathy:     She has no cervical adenopathy.   Neurological: She is alert and oriented to person, place, and time.   Vitals reviewed.        Medication List with Changes/Refills   New Medications    AZELASTINE (ASTELIN) 137 MCG (0.1 %) NASAL SPRAY    1-2 sprays (137-274 mcg total) by Nasal route 2 (two) times daily.   Current Medications    ALPRAZOLAM (XANAX) 0.5 MG TABLET    Take 1 tablet (0.5 mg total) by mouth daily as needed.    BENAZEPRIL-HYDROCHLORTHIAZIDE (LOTENSIN HCT) 20-12.5 MG PER TABLET    TAKE TWO TABLETS BY MOUTH ONCE DAILY    CANAGLIFLOZIN (INVOKANA) 300 MG TAB TABLET    Take 1 tablet (300 mg total) by mouth once daily.    DIMETHYL FUMARATE (TECFIDERA) 240 MG CPDR    Take 240 mg by mouth 2 (two) times daily.    ERGOCALCIFEROL (ERGOCALCIFEROL) 50,000 UNIT CAP    Take 1 capsule (50,000 Units total) by mouth every 7 days.    FEXOFENADINE (ALLEGRA) 180 MG TABLET    Take 1 tablet (180 mg total) by mouth once daily.    IBUPROFEN (ADVIL,MOTRIN) 800 MG TABLET    Take 1 tablet (800 mg total) by mouth 3 (three) times daily with meals.    LEVOCETIRIZINE (XYZAL) 5 MG TABLET    Take 1 tablet (5 mg total) by mouth every morning.    LEVOTHYROXINE (SYNTHROID) 200 MCG TABLET    Take 1 tablet (200 mcg total) by mouth once daily.    METOPROLOL SUCCINATE (TOPROL-XL) 50 MG 24 HR TABLET    Take 1 tablet (50 mg total) by mouth once daily.    SITAGLIPTIN (JANUVIA) 100 MG TAB    Take 1 tablet (100 mg total) by mouth once daily.    TRIAMCINOLONE ACETONIDE 0.1% (KENALOG) 0.1 % LOTN    Apply topically 2 (two) times daily.  Do not use longer than 1 to 2 weeks    ZOLPIDEM (AMBIEN) 10 MG TAB    Take 1 tablet (10 mg total) by mouth nightly as needed.   Discontinued Medications    LORATADINE (CLARITIN) 10 MG TABLET    Take 1 tablet (10 mg total) by mouth once daily.    METHYLPREDNISOLONE (MEDROL DOSEPACK) 4 MG TABLET    use as directed           Diagnosis       1. Allergic rhinitis due to other allergic trigger, unspecified chronicity, unspecified seasonality          Assessment/ Plan     Allergic rhinitis due to other allergic trigger, unspecified chronicity, unspecified seasonality        -     This problem is currently not controlled, but improved reported since last visit.        -     Treatment plan and medication discussed.        -     Finish out the steroid pack as ordered.        -     Continue Allegra 180 mg daily.  -     betamethasone acetate-betamethasone sodium phosphate injection 12 mg; Inject 2 mLs (12 mg total) into the muscle one time.  -     azelastine (ASTELIN) 137 mcg (0.1 %) nasal spray; 1-2 sprays (137-274 mcg total) by Nasal route 2 (two) times daily.  Dispense: 30 mL; Refill: 1  -     Follow-up in clinic as needed.         ELIJAH Serra  [facility]

## 2018-04-11 ENCOUNTER — TELEPHONE (OUTPATIENT)
Dept: FAMILY MEDICINE | Facility: CLINIC | Age: 43
End: 2018-04-11

## 2018-04-11 RX ORDER — MELOXICAM 15 MG/1
15 TABLET ORAL DAILY
Qty: 30 TABLET | Refills: 6 | Status: SHIPPED | OUTPATIENT
Start: 2018-04-11 | End: 2019-05-30 | Stop reason: SDUPTHER

## 2018-04-11 RX ORDER — CYCLOBENZAPRINE HCL 10 MG
10 TABLET ORAL 3 TIMES DAILY PRN
Qty: 90 TABLET | Refills: 0 | Status: SHIPPED | OUTPATIENT
Start: 2018-04-11 | End: 2018-07-03 | Stop reason: SDUPTHER

## 2018-04-11 NOTE — TELEPHONE ENCOUNTER
Pt is requesting cyclobenzaprine and Mobic to be sent to [facility] in [place] as discussed on last visit.

## 2018-04-11 NOTE — TELEPHONE ENCOUNTER
----- Message from Kiara Romero sent at 4/11/2018  3:49 PM CDT -----  Contact: Pt  Pt request call back .[phone] (rogl)

## 2018-05-06 DIAGNOSIS — R09.81 SINUS CONGESTION: ICD-10-CM

## 2018-05-10 RX ORDER — LORATADINE 10 MG/1
TABLET ORAL
Qty: 30 TABLET | Refills: 0 | OUTPATIENT
Start: 2018-05-10

## 2018-07-03 RX ORDER — CYCLOBENZAPRINE HCL 10 MG
TABLET ORAL
Qty: 90 TABLET | Refills: 0 | Status: SHIPPED | OUTPATIENT
Start: 2018-07-03 | End: 2019-05-30 | Stop reason: SDUPTHER

## 2018-07-05 ENCOUNTER — OFFICE VISIT (OUTPATIENT)
Dept: FAMILY MEDICINE | Facility: CLINIC | Age: 43
End: 2018-07-05
Payer: COMMERCIAL

## 2018-07-05 VITALS
HEART RATE: 93 BPM | OXYGEN SATURATION: 98 % | BODY MASS INDEX: 44.33 KG/M2 | SYSTOLIC BLOOD PRESSURE: 100 MMHG | DIASTOLIC BLOOD PRESSURE: 78 MMHG | HEIGHT: 61 IN | TEMPERATURE: 98 F | RESPIRATION RATE: 16 BRPM | WEIGHT: 234.81 LBS

## 2018-07-05 DIAGNOSIS — R21 FACIAL RASH: Primary | ICD-10-CM

## 2018-07-05 PROCEDURE — 3078F DIAST BP <80 MM HG: CPT | Mod: CPTII,S$GLB,, | Performed by: REGISTERED NURSE

## 2018-07-05 PROCEDURE — 99999 PR PBB SHADOW E&M-EST. PATIENT-LVL III: CPT | Mod: PBBFAC,,, | Performed by: REGISTERED NURSE

## 2018-07-05 PROCEDURE — 3074F SYST BP LT 130 MM HG: CPT | Mod: CPTII,S$GLB,, | Performed by: REGISTERED NURSE

## 2018-07-05 PROCEDURE — 99213 OFFICE O/P EST LOW 20 MIN: CPT | Mod: S$GLB,,, | Performed by: REGISTERED NURSE

## 2018-07-05 PROCEDURE — 3008F BODY MASS INDEX DOCD: CPT | Mod: CPTII,S$GLB,, | Performed by: REGISTERED NURSE

## 2018-07-05 RX ORDER — LEVOTHYROXINE SODIUM 150 UG/1
150 TABLET ORAL DAILY
COMMUNITY
Start: 2018-05-09 | End: 2018-09-06 | Stop reason: DRUGHIGH

## 2018-07-05 NOTE — PROGRESS NOTES
"Subjective:       Patient ID: Garret Jones is a 42 y.o. female.    Chief Complaint: Rash      HPI    Garret is here today with c/o facial rash.  Face has been dry and flaky, broke out after using Vitamin-E oil and taking Vitamin-E oral supplements.  Reports sensitive dry skin.      Review of Systems  See HPI      Patient Active Problem List   Diagnosis    HTN (hypertension)    Hyperlipidemia    Allergic rhinitis    Hypothyroidism    Migraine headache    Iron deficiency anemia    Insulin resistance    Morbid obesity with BMI of 40.0-44.9, adult    Adjustment disorder with mixed anxiety and depressed mood    Insomnia    Vitamin D deficiency disease    Multiple sclerosis       Past medical, surgical, family and social histories have been reviewed today.        Objective:     Vitals:    07/05/18 1115   BP: 100/78   Pulse: 93   Resp: 16   Temp: 97.8 °F (36.6 °C)   SpO2: 98%   Weight: 106.5 kg (234 lb 12.6 oz)   Height: 5' 1" (1.549 m)       Physical Exam   Constitutional: She is oriented to person, place, and time. She appears well-developed and well-nourished.   Neurological: She is alert and oriented to person, place, and time.   Skin: Rash (dry skin to facial area, mild flaking in parts near chin and jawline) noted.         Medication List with Changes/Refills   Current Medications    ALPRAZOLAM (XANAX) 0.5 MG TABLET    Take 1 tablet (0.5 mg total) by mouth daily as needed.    AZELASTINE (ASTELIN) 137 MCG (0.1 %) NASAL SPRAY    1-2 sprays (137-274 mcg total) by Nasal route 2 (two) times daily.    BENAZEPRIL-HYDROCHLORTHIAZIDE (LOTENSIN HCT) 20-12.5 MG PER TABLET    TAKE TWO TABLETS BY MOUTH ONCE DAILY    CANAGLIFLOZIN (INVOKANA) 300 MG TAB TABLET    Take 1 tablet (300 mg total) by mouth once daily.    CYCLOBENZAPRINE (FLEXERIL) 10 MG TABLET    TAKE 1 TABLET BY MOUTH THREE TIMES DAILY AS NEEDED FOR MUSCLE SPASMS    DIMETHYL FUMARATE (TECFIDERA) 240 MG CPDR    Take 240 mg by mouth 2 (two) times " daily.    ERGOCALCIFEROL (ERGOCALCIFEROL) 50,000 UNIT CAP    Take 1 capsule (50,000 Units total) by mouth every 7 days.    FEXOFENADINE (ALLEGRA) 180 MG TABLET    Take 1 tablet (180 mg total) by mouth once daily.    LEVOCETIRIZINE (XYZAL) 5 MG TABLET    Take 1 tablet (5 mg total) by mouth every morning.    LEVOTHYROXINE (SYNTHROID) 150 MCG TABLET    Take 150 mcg by mouth once daily.    MELOXICAM (MOBIC) 15 MG TABLET    Take 1 tablet (15 mg total) by mouth once daily.    METOPROLOL SUCCINATE (TOPROL-XL) 50 MG 24 HR TABLET    Take 1 tablet (50 mg total) by mouth once daily.    SITAGLIPTIN (JANUVIA) 100 MG TAB    Take 1 tablet (100 mg total) by mouth once daily.    TRIAMCINOLONE ACETONIDE 0.1% (KENALOG) 0.1 % LOTN    Apply topically 2 (two) times daily. Do not use longer than 1 to 2 weeks    ZOLPIDEM (AMBIEN) 10 MG TAB    Take 1 tablet (10 mg total) by mouth nightly as needed.   Discontinued Medications    IBUPROFEN (ADVIL,MOTRIN) 800 MG TABLET    Take 1 tablet (800 mg total) by mouth 3 (three) times daily with meals.    LEVOTHYROXINE (SYNTHROID) 200 MCG TABLET    Take 1 tablet (200 mcg total) by mouth once daily.           Diagnosis       1. Facial rash          Assessment/ Plan     Facial rash  · Skin/facial care discussed.  · To Derm if needed.        ELIJAH Serra  [facility]

## 2018-08-17 ENCOUNTER — OFFICE VISIT (OUTPATIENT)
Dept: URGENT CARE | Facility: CLINIC | Age: 43
End: 2018-08-17
Payer: COMMERCIAL

## 2018-08-17 ENCOUNTER — TELEPHONE (OUTPATIENT)
Dept: FAMILY MEDICINE | Facility: CLINIC | Age: 43
End: 2018-08-17

## 2018-08-17 VITALS
WEIGHT: 231.06 LBS | DIASTOLIC BLOOD PRESSURE: 84 MMHG | OXYGEN SATURATION: 100 % | HEIGHT: 61 IN | TEMPERATURE: 100 F | RESPIRATION RATE: 18 BRPM | BODY MASS INDEX: 43.63 KG/M2 | HEART RATE: 92 BPM | SYSTOLIC BLOOD PRESSURE: 122 MMHG

## 2018-08-17 DIAGNOSIS — T78.40XA ALLERGIC REACTION, INITIAL ENCOUNTER: Primary | ICD-10-CM

## 2018-08-17 DIAGNOSIS — L30.9 DERMATITIS: ICD-10-CM

## 2018-08-17 PROCEDURE — 3074F SYST BP LT 130 MM HG: CPT | Mod: CPTII,S$GLB,, | Performed by: NURSE PRACTITIONER

## 2018-08-17 PROCEDURE — 99213 OFFICE O/P EST LOW 20 MIN: CPT | Mod: 25,S$GLB,, | Performed by: NURSE PRACTITIONER

## 2018-08-17 PROCEDURE — 96372 THER/PROPH/DIAG INJ SC/IM: CPT | Mod: S$GLB,,, | Performed by: NURSE PRACTITIONER

## 2018-08-17 PROCEDURE — 99999 PR PBB SHADOW E&M-EST. PATIENT-LVL V: CPT | Mod: PBBFAC,,, | Performed by: NURSE PRACTITIONER

## 2018-08-17 PROCEDURE — 3079F DIAST BP 80-89 MM HG: CPT | Mod: CPTII,S$GLB,, | Performed by: NURSE PRACTITIONER

## 2018-08-17 RX ORDER — BETAMETHASONE SODIUM PHOSPHATE AND BETAMETHASONE ACETATE 3; 3 MG/ML; MG/ML
12 INJECTION, SUSPENSION INTRA-ARTICULAR; INTRALESIONAL; INTRAMUSCULAR; SOFT TISSUE
Status: COMPLETED | OUTPATIENT
Start: 2018-08-17 | End: 2018-08-17

## 2018-08-17 RX ADMIN — BETAMETHASONE SODIUM PHOSPHATE AND BETAMETHASONE ACETATE 12 MG: 3; 3 INJECTION, SUSPENSION INTRA-ARTICULAR; INTRALESIONAL; INTRAMUSCULAR; SOFT TISSUE at 06:08

## 2018-08-17 NOTE — TELEPHONE ENCOUNTER
Pt states she would like a asthma inhaler called in and eye drops for her allergies. Allegra is not working.

## 2018-08-17 NOTE — TELEPHONE ENCOUNTER
----- Message from Lazaro Sweeney sent at 8/17/2018  8:28 AM CDT -----  Contact: Self-[phone]   Pt Would like to sees if some asthma Rx medication  And eye drops can be called in,  Allegra is not working .  Please call back at [phone].         Pt Uses:  .  [id number] [address] - [facility]  [facility]  [place] [id number]  Phone: [phone] Fax: [phone]         2017

## 2018-08-17 NOTE — PROGRESS NOTES
After obtaining consent, and per orders of ROZINA Haskins NP , injection of SEE MAR given by Molly Zhang. Patient instructed to remain in clinic for 20 minutes afterwards, and to report any adverse reaction to me immediately.

## 2018-08-17 NOTE — PROGRESS NOTES
Subjective:       Patient ID: Garret Jones is a 42 y.o. female.    Chief Complaint: Belepharitis    42 year old black female presents to urgent care with reports of bilateral eye swelling resulted from irritants from bleach and fabric sheets. Denies any other problems or concerns at this time.       Eye Problem    Both eyes are affected.This is a new problem. The current episode started yesterday. The problem occurs intermittently. The problem has been unchanged. There was no injury mechanism. Associated symptoms include eye redness. Pertinent negatives include no eye discharge, fever, nausea, vomiting or weakness.     Review of Systems   Constitutional: Negative for appetite change, chills and fever.   HENT: Negative for ear pain, sinus pressure, sore throat and trouble swallowing.    Eyes: Positive for redness. Negative for discharge.        Bilateral eye swelling   Respiratory: Negative for shortness of breath.    Cardiovascular: Negative for chest pain.   Gastrointestinal: Negative for abdominal pain, diarrhea, nausea and vomiting.   Endocrine: Negative for cold intolerance, polyphagia and polyuria.   Genitourinary: Negative for decreased urine volume and dysuria.   Musculoskeletal: Negative for back pain.   Skin: Negative for rash.   Allergic/Immunologic: Negative for environmental allergies and food allergies.   Neurological: Negative for dizziness, tremors, weakness and numbness.   Hematological: Does not bruise/bleed easily.   Psychiatric/Behavioral: Negative for confusion and hallucinations. The patient is not nervous/anxious and is not hyperactive.    All other systems reviewed and are negative.      Objective:     Physical Exam   Constitutional: She is oriented to person, place, and time. She appears well-developed and well-nourished. No distress.   HENT:   Head: Normocephalic.   Right Ear: Tympanic membrane, external ear and ear canal normal.   Left Ear: Tympanic membrane, external ear and ear  canal normal.   Nose: Nose normal. No mucosal edema or rhinorrhea. Right sinus exhibits no maxillary sinus tenderness and no frontal sinus tenderness. Left sinus exhibits no maxillary sinus tenderness and no frontal sinus tenderness.   Mouth/Throat: Uvula is midline and oropharynx is clear and moist. No oropharyngeal exudate, posterior oropharyngeal edema or posterior oropharyngeal erythema.   Eyes: Conjunctivae and EOM are normal.       Neck: Normal range of motion. Neck supple.   Cardiovascular: Normal rate, regular rhythm and normal heart sounds.   Pulmonary/Chest: Effort normal and breath sounds normal. No accessory muscle usage. No apnea, no tachypnea and no bradypnea. No respiratory distress. She has no decreased breath sounds. She has no wheezes. She has no rhonchi. She has no rales.   Lymphadenopathy:        Head (right side): No submental, no submandibular and no tonsillar adenopathy present.        Head (left side): No submental, no submandibular and no tonsillar adenopathy present.     She has no cervical adenopathy.   Neurological: She is alert and oriented to person, place, and time.   Skin: Skin is warm and dry. She is not diaphoretic.          Assessment:     1. Allergic reaction, initial encounter    2. Dermatitis      Plan:   Garret was seen today for belepharitis.    Diagnoses and all orders for this visit:    Allergic reaction, initial encounter  -     Visual acuity screening    Dermatitis    Other orders  -     betamethasone acetate-betamethasone sodium phosphate injection 12 mg; Inject 2 mLs (12 mg total) into the muscle one time.  -     dexamethasone (DECADRON) 0.1 % DrpS; Place 2 drops into both eyes every 8 (eight) hours. for 10 days

## 2018-08-17 NOTE — PATIENT INSTRUCTIONS
General Allergic Reactions  An allergic reaction is a set of symptoms caused by an allergen. An allergen is something that causes a persons immune system to react. When a person comes in contact with an allergen, it causes the body to release chemicals. These include the chemical histamine. Histamine causes swelling and itching. It may affect the entire body. This is called a general allergic reaction. Often symptoms affect only 1 part of the body. This is called a local allergic reaction.  You are having an allergic reaction. Almost anything can cause one. Different people are allergic to different things. It is usually something that you ate or swallowed, came into contact with by getting or putting it on your skin or clothes, or something you breathed in the air. This can be very annoying and sometimes scary.  Most of us think of allergic reactions when we have a rash or itchy skin. Symptoms can include:  · Itching of the eyes, nose, and roof of the mouth  · Runny or stuffy nose  · Watery eyes   · Sneezing or coughing   · A blocked feeling in the ear  · Red, itchy rash called hives  · Red and purple spots  · Rash, redness, welts, blisters  · Itching, burning, stinging, pain  · Dry, flaky, cracking, scaly skin  Severe symptoms include:  · Swelling of the face, lips, or other parts of the body  · Hoarse voice  · Trouble swallowing, feeling like your throat is closing  · Trouble breathing, wheezing  · Nausea, vomiting, diarrhea, stomach cramps  · Feeling faint or lightheaded, rapid heart rate  Sometimes the cause may be obvious. But there are so many things that can cause a reaction that you may not be able to figure out. The most important things to help find your allergen are:  · Remembering when it started  · What you were doing at the time or just before that  · Any activities you were involved in  · Any new products or contacts  Below are some common causes. But remember that almost anything can cause a  reaction. You may not even be aware that you came into contact with one of these things:  · Dust, mold, pollen  · Plants (common ones are poison ivy and poison oak, but there are many others)   · Animals  · Foods such as shrimp, shellfish, peanuts, milk products, gluten, and eggs. Also food colorings, flavorings, and additives.  · Insect bites or stings such as bees, mosquitos, fleas, ticks  · Medicines such as penicillin, sulfa medicines, amoxicillin, aspirin, and ibuprofen. But any medicine can cause a reaction.  · Jewelry such as nickel or gold. This can be new, or something youve worn for a while, including zippers and buttons.  · Latex such as in gloves, clothes, toys, balloons, or some tapes. Some people allergic to latex may also have problems with foods like bananas, avocados, kiwi, papaya, or chestnuts.  · Lotions, perfumes, cosmetics, soaps, shampoos, skincare products, nail products  · Chemicals or dyes in clothing, linen, , hair dyes, soaps, iodine  Many viruses and common colds can cause a rash that is not an allergic reaction. Sometimes it is hard to tell the difference between allergies, sensitivity, or an intolerance to something. This is especially true with food. Many things can cause diarrhea, vomiting, stomach cramps, and skin irritation.  Home care    The goal of treatment is to help relieve the symptoms and get you feeling better. The rash will usually fade over several days. But it can sometimes last a couple of weeks. Over the next couple of days, there may be times when it is gets a little worse, and then better again. Here are some things to do:  · If you know what you are allergic to, stay away from it. Future reactions could be worse than this one.  · Avoid tight clothing and anything that heats up your skin (hot showers or baths, direct sunlight). Heat will make itching worse.  · An ice pack will relieve local areas of intense itching and redness. To make an ice pack, put ice  cubes in a plastic bag that seals at the top. Wrap it in a thin, clean towel. Dont put the ice directly on the skin because it can damage the skin.  · Oral diphenhydramine is an over-the-counter antihistamine sold at pharmacy and grocery stores. Unless a prescription antihistamine was given, diphenhydramine may be used to reduce itching if large areas of the skin are involved. It may make you sleepy. So be careful using it in the daytime or when going to school, working, or driving. Note: Dont use diphenhydramine if you have glaucoma or if you are a man with trouble urinating due to an enlarged prostate. There are other antihistamines that wont make you so sleepy. These are good choices for daytime use. Ask your pharmacist for suggestions.  · Dont use diphenhydramine cream on your skin. It can cause a further reaction in some people.  · To help prevent an infection, don't scratch the affected area. Scratching may worsen the reaction and damage your skin. It can also lead to an infection. Always check the affected for signs of an infection.  · Call your healthcare provider and ask what you can use to help decrease the itching.  · To decrease allergic reactions, try the following:    · Use heat-steam to clean your home  · Use high-efficiency particulate (HEPA) vacuums and filters  · Stay away from food and pet triggers  · Kill any cockroaches  · Clean your house often  Follow-up care  Follow up with your healthcare provider, or as advised. If you had a severe reaction today, or if you have had several mild to medium allergic reactions in the past, ask your provider about allergy testing. This can help you find out what you are allergic to. If your reaction included dizziness, fainting, or trouble breathing or swallowing, ask your provider about carrying auto-injectable epinephrine.  Call 911  Call 911 if any of these occur:  · Trouble breathing or swallowing, wheezing  · Cool, moist, pale skin  · Shortness of  breath  · Hoarse voice or trouble speaking  · Confused   · Very drowsy or trouble awakening  · Fainting or loss of consciousness  · Rapid heart rate  · Feeling of dizziness or weakness or a sudden drop in blood pressure  · Feeling of doom  · Feeling lightheaded  · Severe nausea or vomiting, or diarrhea  · Seizure  · Swelling in the face, eyelids, lips, mouth, throat or tongue  · Drooling  When to seek medical advice  Call your healthcare provider right away if any of these occur:  · Spreading areas of itching, redness or swelling  · Nausea or stomach cramps or abdominal pain  · Continuing or recurring symptoms  · Spreading areas of redness, swelling, or itching  · Signs of infection at the affected site:  ¨ Spreading redness  ¨ Increased pain or swelling  ¨ Fluid or colored drainage from the site  ¨ Fever of 100.4°F (38°C) or above lasting for 24 to 48 hours, or as directed by your provider  Date Last Reviewed: 3/1/2017  © 9276-0281 [employer] [address] [place] [address], [place] [address]. All rights reserved. This information is not intended as a substitute for professional medical care. Always follow your healthcare professional's instructions.

## 2018-08-20 RX ORDER — CROMOLYN SODIUM 40 MG/ML
1-2 SOLUTION/ DROPS OPHTHALMIC 4 TIMES DAILY
Qty: 10 ML | Refills: 2 | Status: SHIPPED | OUTPATIENT
Start: 2018-08-20 | End: 2018-08-31

## 2018-08-20 RX ORDER — ALBUTEROL SULFATE 90 UG/1
2 AEROSOL, METERED RESPIRATORY (INHALATION) EVERY 6 HOURS PRN
Qty: 18 G | Refills: 0 | Status: SHIPPED | OUTPATIENT
Start: 2018-08-20 | End: 2018-10-02

## 2018-08-24 ENCOUNTER — OFFICE VISIT (OUTPATIENT)
Dept: FAMILY MEDICINE | Facility: CLINIC | Age: 43
End: 2018-08-24
Payer: COMMERCIAL

## 2018-08-24 VITALS
DIASTOLIC BLOOD PRESSURE: 84 MMHG | SYSTOLIC BLOOD PRESSURE: 128 MMHG | HEART RATE: 88 BPM | BODY MASS INDEX: 43.25 KG/M2 | OXYGEN SATURATION: 98 % | HEIGHT: 61 IN | WEIGHT: 229.06 LBS | TEMPERATURE: 98 F

## 2018-08-24 DIAGNOSIS — B37.31 VULVOVAGINAL CANDIDIASIS: Primary | ICD-10-CM

## 2018-08-24 PROCEDURE — 99999 PR PBB SHADOW E&M-EST. PATIENT-LVL IV: CPT | Mod: PBBFAC,,, | Performed by: REGISTERED NURSE

## 2018-08-24 PROCEDURE — 3074F SYST BP LT 130 MM HG: CPT | Mod: CPTII,S$GLB,, | Performed by: REGISTERED NURSE

## 2018-08-24 PROCEDURE — 3079F DIAST BP 80-89 MM HG: CPT | Mod: CPTII,S$GLB,, | Performed by: REGISTERED NURSE

## 2018-08-24 PROCEDURE — 3008F BODY MASS INDEX DOCD: CPT | Mod: CPTII,S$GLB,, | Performed by: REGISTERED NURSE

## 2018-08-24 PROCEDURE — 99213 OFFICE O/P EST LOW 20 MIN: CPT | Mod: S$GLB,,, | Performed by: REGISTERED NURSE

## 2018-08-24 RX ORDER — FLUCONAZOLE 150 MG/1
TABLET ORAL
Qty: 2 TABLET | Refills: 0 | Status: SHIPPED | OUTPATIENT
Start: 2018-08-24 | End: 2018-08-31

## 2018-08-24 RX ORDER — NYSTATIN 100000 U/G
CREAM TOPICAL 2 TIMES DAILY
Qty: 30 G | Refills: 0 | Status: SHIPPED | OUTPATIENT
Start: 2018-08-24 | End: 2018-08-31

## 2018-08-30 NOTE — PROGRESS NOTES
"Subjective:       Patient ID: Garret Jones is a 42 y.o. female.    Chief Complaint:  Vaginal irritation      HPI    Garret is here today with c/o vaginal irritation for past several days.  Monostat not helping.  Reports increased moisture & sweating due to exercise.  Uses Dial soap for bathing, wears black panties.  Reports itching but no odor or discharge, thinks likely due to yeast infection.      Review of Systems  See HPI      Patient Active Problem List   Diagnosis    HTN (hypertension)    Hyperlipidemia    Allergic rhinitis    Hypothyroidism    Migraine headache    Iron deficiency anemia    Insulin resistance    Morbid obesity with BMI of 40.0-44.9, adult    Adjustment disorder with mixed anxiety and depressed mood    Insomnia    Vitamin D deficiency disease    Multiple sclerosis       Past medical, surgical, family and social histories have been reviewed today.        Objective:     Vitals:    08/24/18 1107   BP: 128/84   BP Location: Right arm   Patient Position: Sitting   BP Method: Medium (Manual)   Pulse: 88   Temp: 98.2 °F (36.8 °C)   TempSrc: Oral   SpO2: 98%   Weight: 103.9 kg (229 lb 0.9 oz)   Height: 5' 1" (1.549 m)       Physical Exam   Constitutional: She is oriented to person, place, and time. She appears well-developed and well-nourished.   Genitourinary:   Genitourinary Comments: Declines exam today.   Neurological: She is alert and oriented to person, place, and time.   Vitals reviewed.        Medication List with Changes/Refills   New Medications    FLUCONAZOLE (DIFLUCAN) 150 MG TAB    Take 1 tab by mouth now, then repeat dose in 3 days.    NYSTATIN (MYCOSTATIN) CREAM    Apply topically 2 (two) times daily. For external use only.   Current Medications    ALBUTEROL (PROAIR HFA) 90 MCG/ACTUATION INHALER    Inhale 2 puffs into the lungs every 6 (six) hours as needed for Wheezing. Rescue    ALPRAZOLAM (XANAX) 0.5 MG TABLET    Take 1 tablet (0.5 mg total) by mouth daily as " needed.    AZELASTINE (ASTELIN) 137 MCG (0.1 %) NASAL SPRAY    1-2 sprays (137-274 mcg total) by Nasal route 2 (two) times daily.    BENAZEPRIL-HYDROCHLORTHIAZIDE (LOTENSIN HCT) 20-12.5 MG PER TABLET    TAKE TWO TABLETS BY MOUTH ONCE DAILY    CANAGLIFLOZIN (INVOKANA) 300 MG TAB TABLET    Take 1 tablet (300 mg total) by mouth once daily.    CROMOLYN (OPTICROM) 4 % OPHTHALMIC SOLUTION    Place 1-2 drops into both eyes 4 (four) times daily.    CYCLOBENZAPRINE (FLEXERIL) 10 MG TABLET    TAKE 1 TABLET BY MOUTH THREE TIMES DAILY AS NEEDED FOR MUSCLE SPASMS    DIMETHYL FUMARATE (TECFIDERA) 240 MG CPDR    Take 240 mg by mouth 2 (two) times daily.    ERGOCALCIFEROL (ERGOCALCIFEROL) 50,000 UNIT CAP    Take 1 capsule (50,000 Units total) by mouth every 7 days.    FEXOFENADINE (ALLEGRA) 180 MG TABLET    Take 1 tablet (180 mg total) by mouth once daily.    LEVOCETIRIZINE (XYZAL) 5 MG TABLET    Take 1 tablet (5 mg total) by mouth every morning.    LEVOTHYROXINE (SYNTHROID) 150 MCG TABLET    Take 150 mcg by mouth once daily.    MELOXICAM (MOBIC) 15 MG TABLET    Take 1 tablet (15 mg total) by mouth once daily.    METOPROLOL SUCCINATE (TOPROL-XL) 50 MG 24 HR TABLET    Take 1 tablet (50 mg total) by mouth once daily.    SITAGLIPTIN (JANUVIA) 100 MG TAB    Take 1 tablet (100 mg total) by mouth once daily.    TRIAMCINOLONE ACETONIDE 0.1% (KENALOG) 0.1 % LOTN    Apply topically 2 (two) times daily. Do not use longer than 1 to 2 weeks for 10 days    ZOLPIDEM (AMBIEN) 10 MG TAB    Take 1 tablet (10 mg total) by mouth nightly as needed.           Diagnosis       1. Vulvovaginal candidiasis          Assessment/ Plan     Vulvovaginal candidiasis  -     fluconazole (DIFLUCAN) 150 MG Tab; Take 1 tab by mouth now, then repeat dose in 3 days.  Dispense: 2 tablet; Refill: 0  -     nystatin (MYCOSTATIN) cream; Apply topically 2 (two) times daily. For external use only.  Dispense: 30 g; Refill: 0        Infection triggers and prevention  discussed.  Follow-up in clinic as needed.        ELIJAH Serra  [facility]

## 2018-08-31 ENCOUNTER — OFFICE VISIT (OUTPATIENT)
Dept: FAMILY MEDICINE | Facility: CLINIC | Age: 43
End: 2018-08-31
Payer: COMMERCIAL

## 2018-08-31 VITALS
SYSTOLIC BLOOD PRESSURE: 142 MMHG | WEIGHT: 231.69 LBS | DIASTOLIC BLOOD PRESSURE: 88 MMHG | BODY MASS INDEX: 43.74 KG/M2 | HEIGHT: 61 IN | TEMPERATURE: 98 F | RESPIRATION RATE: 18 BRPM | HEART RATE: 74 BPM

## 2018-08-31 DIAGNOSIS — B37.31 VULVOVAGINAL CANDIDIASIS: Primary | ICD-10-CM

## 2018-08-31 PROCEDURE — 99999 PR PBB SHADOW E&M-EST. PATIENT-LVL V: CPT | Mod: PBBFAC,,, | Performed by: REGISTERED NURSE

## 2018-08-31 PROCEDURE — 3077F SYST BP >= 140 MM HG: CPT | Mod: CPTII,S$GLB,, | Performed by: REGISTERED NURSE

## 2018-08-31 PROCEDURE — 3079F DIAST BP 80-89 MM HG: CPT | Mod: CPTII,S$GLB,, | Performed by: REGISTERED NURSE

## 2018-08-31 PROCEDURE — 99213 OFFICE O/P EST LOW 20 MIN: CPT | Mod: S$GLB,,, | Performed by: REGISTERED NURSE

## 2018-08-31 PROCEDURE — 3008F BODY MASS INDEX DOCD: CPT | Mod: CPTII,S$GLB,, | Performed by: REGISTERED NURSE

## 2018-08-31 RX ORDER — FLUCONAZOLE 150 MG/1
TABLET ORAL
Qty: 2 TABLET | Refills: 0 | Status: SHIPPED | OUTPATIENT
Start: 2018-08-31 | End: 2018-09-06 | Stop reason: ALTCHOICE

## 2018-08-31 NOTE — PROGRESS NOTES
"Subjective:       Patient ID: Garret Jones is a 42 y.o. female.    Chief Complaint: Follow-up      HPI    Garret is here today to f/u on vaginal yeast.  Last seen in office on 8/24/2018, treated with Nystatin and Diflucan tab x 1 dose.  She has used the cream and feels the external itching resolved.  Still with some vaginal irritation and itching at times, but overall has improved per her report.  She is on Invokana which may be predisposing her to vaginal yeast issues.      Review of Systems   Genitourinary: Negative for dysuria, genital sores, hematuria, vaginal bleeding, vaginal discharge and vaginal pain (itching).         Patient Active Problem List   Diagnosis    HTN (hypertension)    Hyperlipidemia    Allergic rhinitis    Hypothyroidism    Migraine headache    Iron deficiency anemia    Insulin resistance    Morbid obesity with BMI of 40.0-44.9, adult    Adjustment disorder with mixed anxiety and depressed mood    Insomnia    Vitamin D deficiency disease    Multiple sclerosis       Past medical, surgical, family and social histories have been reviewed today.        Objective:     Vitals:    08/31/18 0810   BP: (!) 142/88   Pulse: 74   Resp: 18   Temp: 98.2 °F (36.8 °C)   TempSrc: Oral   Weight: 105.1 kg (231 lb 11.3 oz)   Height: 5' 1" (1.549 m)       Physical Exam   Constitutional: She is oriented to person, place, and time. She appears well-developed and well-nourished.   Genitourinary:   Genitourinary Comments: PT declines exam today.   Neurological: She is alert and oriented to person, place, and time.   Vitals reviewed.        Medication List with Changes/Refills   New Medications    FLUCONAZOLE (DIFLUCAN) 150 MG TAB    Take 1 tab by mouth today, then repeat dose in 3 days.   Current Medications    ALBUTEROL (PROAIR HFA) 90 MCG/ACTUATION INHALER    Inhale 2 puffs into the lungs every 6 (six) hours as needed for Wheezing. Rescue    ALPRAZOLAM (XANAX) 0.5 MG TABLET    Take 1 tablet " (0.5 mg total) by mouth daily as needed.    BENAZEPRIL-HYDROCHLORTHIAZIDE (LOTENSIN HCT) 20-12.5 MG PER TABLET    TAKE TWO TABLETS BY MOUTH ONCE DAILY    CANAGLIFLOZIN (INVOKANA) 300 MG TAB TABLET    Take 1 tablet (300 mg total) by mouth once daily.    CYCLOBENZAPRINE (FLEXERIL) 10 MG TABLET    TAKE 1 TABLET BY MOUTH THREE TIMES DAILY AS NEEDED FOR MUSCLE SPASMS    DIMETHYL FUMARATE (TECFIDERA) 240 MG CPDR    Take 240 mg by mouth 2 (two) times daily.    ERGOCALCIFEROL (ERGOCALCIFEROL) 50,000 UNIT CAP    Take 1 capsule (50,000 Units total) by mouth every 7 days.    FEXOFENADINE (ALLEGRA) 180 MG TABLET    Take 1 tablet (180 mg total) by mouth once daily.    LEVOCETIRIZINE (XYZAL) 5 MG TABLET    Take 1 tablet (5 mg total) by mouth every morning.    LEVOTHYROXINE (SYNTHROID) 150 MCG TABLET    Take 150 mcg by mouth once daily.    MELOXICAM (MOBIC) 15 MG TABLET    Take 1 tablet (15 mg total) by mouth once daily.    METOPROLOL SUCCINATE (TOPROL-XL) 50 MG 24 HR TABLET    Take 1 tablet (50 mg total) by mouth once daily.    SITAGLIPTIN (JANUVIA) 100 MG TAB    Take 1 tablet (100 mg total) by mouth once daily.    ZOLPIDEM (AMBIEN) 10 MG TAB    Take 1 tablet (10 mg total) by mouth nightly as needed.   Discontinued Medications    AZELASTINE (ASTELIN) 137 MCG (0.1 %) NASAL SPRAY    1-2 sprays (137-274 mcg total) by Nasal route 2 (two) times daily.    CROMOLYN (OPTICROM) 4 % OPHTHALMIC SOLUTION    Place 1-2 drops into both eyes 4 (four) times daily.    FLUCONAZOLE (DIFLUCAN) 150 MG TAB    Take 1 tab by mouth now, then repeat dose in 3 days.    NYSTATIN (MYCOSTATIN) CREAM    Apply topically 2 (two) times daily. For external use only.    TRIAMCINOLONE ACETONIDE 0.1% (KENALOG) 0.1 % LOTN    Apply topically 2 (two) times daily. Do not use longer than 1 to 2 weeks for 10 days           Diagnosis       1. Vulvovaginal candidiasis          Assessment/ Plan     Vulvovaginal candidiasis  · Problem not controlled at this time but has  improved since last visit 8/24/2018.  · Medication discussed, take as directed.  · Infection triggers and prevention.  · Orders:  -     fluconazole (DIFLUCAN) 150 MG Tab; Take 1 tab by mouth today, then repeat dose in 3 days.  Dispense: 2 tablet; Refill: 0        Follow-up in clinic as needed.        ELIJAH Serra  [facility]

## 2018-09-25 DIAGNOSIS — G47.00 INSOMNIA, UNSPECIFIED TYPE: ICD-10-CM

## 2018-09-25 RX ORDER — ZOLPIDEM TARTRATE 10 MG/1
10 TABLET ORAL NIGHTLY PRN
Qty: 30 TABLET | Refills: 2 | OUTPATIENT
Start: 2018-09-25 | End: 2018-10-25

## 2018-09-25 NOTE — TELEPHONE ENCOUNTER
Spoke with patient and informed her that the rx was refused. Pt states that it was refilled through [facility] before. Informed patient that the Rx was given by Dr. Donovan Sanders and that she should contact prescribing MD for refill. Please advise.//ky

## 2018-09-25 NOTE — TELEPHONE ENCOUNTER
----- Message from Mirella Delgadillo sent at 9/25/2018  8:26 AM CDT -----  1. What is the name of the medication you are requesting? Ambien  2. What is the dose? Not sure  3. How do you take the medication? Orally, topically, etc? Orally  4. How often do you take this medication? Take one at bedtime  5. Do you need a 30 day or 90 day supply? 30   6. How many refills are you requesting? 1  7. What is your preferred pharmacy and location of the pharmacy? [facility] in [place]  8. Who can we contact with further questions? [phone]

## 2018-09-25 NOTE — TELEPHONE ENCOUNTER
Ambien has been refilled monthly per Dr. Donovan Sanders (not sure where located) since July 2018.  Dr. Ana Bartlett (PCP) has been refilling monthly since 1/2018.  I last refilled 12/2017.  No annual on file, she likely gets her annuals with Dr. Bartlett's office.  Need to contact PCP office////adc

## 2018-10-02 ENCOUNTER — OFFICE VISIT (OUTPATIENT)
Dept: FAMILY MEDICINE | Facility: CLINIC | Age: 43
End: 2018-10-02
Payer: COMMERCIAL

## 2018-10-02 VITALS
HEART RATE: 71 BPM | TEMPERATURE: 98 F | WEIGHT: 233.69 LBS | BODY MASS INDEX: 44.12 KG/M2 | HEIGHT: 61 IN | DIASTOLIC BLOOD PRESSURE: 88 MMHG | SYSTOLIC BLOOD PRESSURE: 118 MMHG

## 2018-10-02 DIAGNOSIS — F41.9 ANXIETY: Primary | ICD-10-CM

## 2018-10-02 DIAGNOSIS — G47.00 INSOMNIA, UNSPECIFIED TYPE: ICD-10-CM

## 2018-10-02 PROCEDURE — 99214 OFFICE O/P EST MOD 30 MIN: CPT | Mod: S$GLB,,, | Performed by: REGISTERED NURSE

## 2018-10-02 PROCEDURE — 3079F DIAST BP 80-89 MM HG: CPT | Mod: CPTII,S$GLB,, | Performed by: REGISTERED NURSE

## 2018-10-02 PROCEDURE — 3074F SYST BP LT 130 MM HG: CPT | Mod: CPTII,S$GLB,, | Performed by: REGISTERED NURSE

## 2018-10-02 PROCEDURE — 99999 PR PBB SHADOW E&M-EST. PATIENT-LVL III: CPT | Mod: PBBFAC,,, | Performed by: REGISTERED NURSE

## 2018-10-02 PROCEDURE — 3008F BODY MASS INDEX DOCD: CPT | Mod: CPTII,S$GLB,, | Performed by: REGISTERED NURSE

## 2018-10-02 RX ORDER — LEVOTHYROXINE SODIUM 150 UG/1
150 TABLET ORAL DAILY
COMMUNITY
End: 2019-06-04 | Stop reason: SDUPTHER

## 2018-10-02 RX ORDER — ALPRAZOLAM 0.5 MG/1
0.5 TABLET ORAL DAILY PRN
Qty: 30 TABLET | Refills: 0 | Status: SHIPPED | OUTPATIENT
Start: 2018-10-02

## 2018-10-02 NOTE — PROGRESS NOTES
Subjective:       Patient ID: Garret Jones is a 42 y.o. female.    Chief Complaint: Anxiety      Anxiety   Presents for initial visit. Onset was 1 to 6 months ago. The problem has been gradually worsening. Symptoms include excessive worry, insomnia (on Ambien prn), irritability and nervous/anxious behavior. Patient reports no chest pain, compulsions, confusion, decreased concentration, depressed mood, dizziness, dry mouth, feeling of choking, hyperventilation, malaise, muscle tension, nausea, obsessions, palpitations, panic, restlessness, shortness of breath or suicidal ideas. Symptoms occur occasionally. The severity of symptoms is mild. The quality of sleep is fair.     Her past medical history is significant for anxiety/panic attacks (has taken Xanax in the past, wants a restart). Past treatments include benzodiazephines and lifestyle changes. Compliance with prior treatments has been good.       Review of Systems   Constitutional: Positive for irritability.   Respiratory: Negative.  Negative for shortness of breath.    Cardiovascular: Negative.  Negative for chest pain and palpitations.   Gastrointestinal: Negative for nausea.   Neurological: Negative.  Negative for dizziness.   Psychiatric/Behavioral: Positive for agitation and sleep disturbance. Negative for confusion, decreased concentration, dysphoric mood, hallucinations, self-injury and suicidal ideas. The patient is nervous/anxious and has insomnia (on Ambien prn). The patient is not hyperactive.        Review of patient's allergies indicates:   Allergen Reactions    Morphine Itching    Amlodipine      Other reaction(s): Headache  Other reaction(s): Edema    Amoxicillin-pot clavulanate Nausea And Vomiting    Codeine Hives and Itching    Hydrocodone Hives, Itching and Hallucinations    Latex Itching    Propoxyphene n-acetaminophen Itching    Tuberculin ppd Hives and Itching       Patient Active Problem List   Diagnosis    HTN  "(hypertension)    Hyperlipidemia    Allergic rhinitis    Hypothyroidism    Migraine headache    Iron deficiency anemia    Insulin resistance    Morbid obesity with BMI of 40.0-44.9, adult    Adjustment disorder with mixed anxiety and depressed mood    Insomnia    Vitamin D deficiency disease    Multiple sclerosis       Current Outpatient Medications on File Prior to Visit   Medication Sig Dispense Refill    benazepril-hydrochlorthiazide (LOTENSIN HCT) 20-12.5 mg per tablet TAKE TWO TABLETS BY MOUTH ONCE DAILY 180 tablet 1    canagliflozin (INVOKANA) 300 mg Tab tablet Take 1 tablet (300 mg total) by mouth once daily. 90 tablet 1    cyclobenzaprine (FLEXERIL) 10 MG tablet TAKE 1 TABLET BY MOUTH THREE TIMES DAILY AS NEEDED FOR MUSCLE SPASMS 90 tablet 0    dimethyl fumarate (TECFIDERA) 240 mg CpDR Take 240 mg by mouth 2 (two) times daily.      ergocalciferol (ERGOCALCIFEROL) 50,000 unit Cap onecap twice a week 24 capsule 1    fexofenadine (ALLEGRA) 180 MG tablet Take 1 tablet (180 mg total) by mouth once daily. 30 tablet 6    levothyroxine (SYNTHROID) 150 MCG tablet Take 150 mcg by mouth once daily.      meloxicam (MOBIC) 15 MG tablet Take 1 tablet (15 mg total) by mouth once daily. 30 tablet 6    metoprolol succinate (TOPROL-XL) 50 MG 24 hr tablet Take 1 tablet (50 mg total) by mouth once daily. 90 tablet 1    SITagliptin (JANUVIA) 100 MG Tab Take 1 tablet (100 mg total) by mouth once daily. 90 tablet 1    levocetirizine (XYZAL) 5 MG tablet Take 1 tablet (5 mg total) by mouth every morning. 30 tablet 6    zolpidem (AMBIEN) 10 mg Tab Take 1 tablet (10 mg total) by mouth nightly as needed. 30 tablet 2         Past medical, surgical, family and social histories have been reviewed today.        Objective:     Vitals:    10/02/18 1335   BP: 118/88   Pulse: 71   Temp: 97.6 °F (36.4 °C)   TempSrc: Oral   Weight: 106 kg (233 lb 11 oz)   Height: 5' 1" (1.549 m)   PainSc: 0-No pain         Physical Exam "   Constitutional: She appears well-developed and well-nourished. No distress.   Skin: She is not diaphoretic.   Psychiatric: She has a normal mood and affect. Her speech is normal and behavior is normal. Judgment and thought content normal. Her affect is not blunt, not labile and not inappropriate. She is not slowed, not withdrawn and not actively hallucinating. Thought content is not paranoid and not delusional. Cognition and memory are normal. She expresses no suicidal ideation. She is attentive.   Vitals reviewed.        Diagnosis       1. Anxiety    2. Insomnia, unspecified type          Assessment/ Plan     Anxiety  · PT with c/o anxiety and agitation, h/o anxiety with Xanax previously, did well and requests restart.  · Coping skills, relaxation, support systems, meditation, etc.  · Medication discussed, take as directed.  · Orders:  -     ALPRAZolam (XANAX) 0.5 MG tablet; Take 1 tablet (0.5 mg total) by mouth daily as needed for Anxiety.  Dispense: 30 tablet; Refill: 0    Insomnia, unspecified type  · Taking Ambien as ordered when needed to help with insomnia, ordered on 9/26/2018 per Dr. Bartlett.      Follow-up in clinic as needed.          ELIJAH Serra  [facility]

## 2018-10-17 ENCOUNTER — OFFICE VISIT (OUTPATIENT)
Dept: DERMATOLOGY | Facility: CLINIC | Age: 43
End: 2018-10-17
Payer: COMMERCIAL

## 2018-10-17 ENCOUNTER — OFFICE VISIT (OUTPATIENT)
Dept: ALLERGY | Facility: CLINIC | Age: 43
End: 2018-10-17
Payer: COMMERCIAL

## 2018-10-17 VITALS
DIASTOLIC BLOOD PRESSURE: 70 MMHG | WEIGHT: 231.94 LBS | TEMPERATURE: 97 F | RESPIRATION RATE: 15 BRPM | HEIGHT: 61 IN | SYSTOLIC BLOOD PRESSURE: 100 MMHG | HEART RATE: 74 BPM | BODY MASS INDEX: 43.79 KG/M2

## 2018-10-17 DIAGNOSIS — L23.9 ALLERGIC CONTACT DERMATITIS, UNSPECIFIED TRIGGER: Primary | ICD-10-CM

## 2018-10-17 DIAGNOSIS — L23.1 ALLERGIC CONTACT DERMATITIS DUE TO ADHESIVES: ICD-10-CM

## 2018-10-17 DIAGNOSIS — J30.1 ALLERGIC RHINITIS DUE TO POLLEN, UNSPECIFIED SEASONALITY: ICD-10-CM

## 2018-10-17 DIAGNOSIS — J30.89 ALLERGIC RHINITIS DUE TO OTHER ALLERGIC TRIGGER, UNSPECIFIED SEASONALITY: ICD-10-CM

## 2018-10-17 DIAGNOSIS — L30.9 DERMATITIS: Primary | ICD-10-CM

## 2018-10-17 DIAGNOSIS — L23.0 ALLERGIC CONTACT DERMATITIS DUE TO METALS: ICD-10-CM

## 2018-10-17 PROCEDURE — 3074F SYST BP LT 130 MM HG: CPT | Mod: CPTII,S$GLB,, | Performed by: ALLERGY & IMMUNOLOGY

## 2018-10-17 PROCEDURE — 99205 OFFICE O/P NEW HI 60 MIN: CPT | Mod: S$GLB,,, | Performed by: ALLERGY & IMMUNOLOGY

## 2018-10-17 PROCEDURE — 99202 OFFICE O/P NEW SF 15 MIN: CPT | Mod: S$GLB,,, | Performed by: DERMATOLOGY

## 2018-10-17 PROCEDURE — 99999 PR PBB SHADOW E&M-EST. PATIENT-LVL III: CPT | Mod: PBBFAC,,, | Performed by: DERMATOLOGY

## 2018-10-17 PROCEDURE — 3008F BODY MASS INDEX DOCD: CPT | Mod: CPTII,S$GLB,, | Performed by: ALLERGY & IMMUNOLOGY

## 2018-10-17 PROCEDURE — 99999 PR PBB SHADOW E&M-EST. PATIENT-LVL III: CPT | Mod: PBBFAC,,, | Performed by: ALLERGY & IMMUNOLOGY

## 2018-10-17 PROCEDURE — 3078F DIAST BP <80 MM HG: CPT | Mod: CPTII,S$GLB,, | Performed by: ALLERGY & IMMUNOLOGY

## 2018-10-17 RX ORDER — HYDROXYZINE HYDROCHLORIDE 10 MG/1
10 TABLET, FILM COATED ORAL 2 TIMES DAILY PRN
Qty: 60 TABLET | Refills: 1 | Status: SHIPPED | OUTPATIENT
Start: 2018-10-17 | End: 2018-11-16

## 2018-10-17 RX ORDER — TACROLIMUS 1 MG/G
OINTMENT TOPICAL
Qty: 30 G | Refills: 2 | Status: SHIPPED | OUTPATIENT
Start: 2018-10-17 | End: 2019-02-26

## 2018-10-17 NOTE — PROGRESS NOTES
Subjective:       Patient ID:  Garret Jones is a 42 y.o. female who presents for   Chief Complaint   Patient presents with    Rash     c/o rash to face and chest     History of Present Illness: The patient presents with chief complaint of rash.  Previously seen by Dr. Jacinta Flores  Location: face and chest  Duration: 5 yrs  Signs/Symptoms: itchy, inflammed, swelling, peeling, and dry    Prior treatments: IM Steroid injections, vaseline, benadryl    Current Skin Care Regimen  Soap: water, dial white bar  Moisturizer: jergen's, vaseline  Detergent:all free and clear x 1.5 week, prior use of Sun   Fabric softener: none  Colognes/Perfumes/Fragrances: car freshener's, glade plug-ins, scented candles  Bathing: both, 15-20 min, hot water          Review of Systems   Constitutional: Negative for fever and chills.   Gastrointestinal: Negative for nausea and vomiting.   Skin: Positive for itching and rash. Negative for daily sunscreen use, activity-related sunscreen use and recent sunburn.   Hematologic/Lymphatic: Does not bruise/bleed easily.        Objective:    Physical Exam   Constitutional: She appears well-developed and well-nourished. No distress.   Neurological: She is alert and oriented to person, place, and time. She is not disoriented.   Psychiatric: She has a normal mood and affect.   Skin:   Areas Examined (abnormalities noted in diagram):   Head / Face Inspection Performed  Neck Inspection Performed  Chest / Axilla Inspection Performed  Abdomen Inspection Performed  Back Inspection Performed  RUE Inspected  LUE Inspection Performed  Nails and Digits Inspection Performed                    Assessment / Plan:        Allergic contact dermatitis, unspecified trigger  -     PROTOPIC 0.1 % ointment; AAA bid  Dispense: 30 g; Refill: 2  -     hydrOXYzine HCl (ATARAX) 10 MG Tab; Take 1 tablet (10 mg total) by mouth 2 (two) times daily as needed. May cause drowsiness.  Do not drive or operate heavy  machinery.  Dispense: 60 tablet; Refill: 1  -     Discussed potential airborne allergens.  Recommend d/c fragrances, air fresheners and scented candles.  Will start above meds. Also recommend pt f/u with Dr. Decker for potential airborne allergen testing. Reviewed sensitive skin care.              Follow-up in about 6 weeks (around 11/28/2018).

## 2018-10-17 NOTE — PATIENT INSTRUCTIONS
New Skin Care Regimen  Soap: Dove sensitive skin bar or liquid  Moisturizer: ceraVe or cetaphil cream  Detergent: Tide Free, All Free, or Cheer Free   Fabric softener: do not use  Colognes/Perfumes/Fragrances: do not use  Bathing: shower or bathe with lukewarm water < 10 minutes

## 2018-10-17 NOTE — PROGRESS NOTES
Chief complaint:  Rash, itching    This note was dictated using voice recognition software and may contain errors.    History:     She had a 10:00 a.m. Appointment on Wednesday October 17, 2018. Information in her medical record regarding her past medical history family history and social history was reviewed and updated today.  Significant addition see if any are as noted below.      She had an appointment with me on Friday, May 26, 2006.  Please refer to my note of that date.  I reviewed my note with her.  Information in this note was reviewed and updated today.  Significant addition see if any are as noted below.      Social history: She stated that she continues to work for the [employer].  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      She has no personal history of asthma.      Family history:  Her sister has a history of asthma.    She has no history of being allergic to poison ivy.  She stated since childhood she is aware that she cannot wear certain types of metal jewelry.  In the past when a Lidocaine patch was applied to the skin of her back she stated that she developed dermatitis in the areas of the patch were the adhesive was present.      She stated about 8 years ago she began to experience dermatitis which is typically been limited to the skin of the face and upper anterior chest.  She stated the remainder of her skin has been free of the rash.  The rashes associated with itching.  She has not experienced dermatitis of the scalp.      At the time of her May 6, 2006 appointment with me diagnostic immediate hypersensitivity prick skin tests were performed.  Thirty-six airborne allergens were tested.  She was found to be allergic to 2 different house dust mites, 9 tree pollens, 6 grass pollens, 2 weed pollens, and 5 different types of mold or fungal  Spores.  I reviewed this information with her.      She dates the onset of her allergic rhinitis symptoms to when she was in 2nd  grade.  Between 2nd grade and 8 years ago she did not experience the dermatitis which she has experienced intermittently during the past 8 years.      Review of systems: At the time of her appointment this morning she stated that she is experiencing itching of the skin of the face and upper anterior chest.   She has not experienced  Inflammation of the oral mucous membrane.    She does not wear fingernail polish.    Exam:    In general she is in no distress.  She is alert oriented well-developed in good mood and attentive  Gait steady  Head no swelling noted  Eyes scleral white conjunctiva pink  Ears not inflamed tympanic membranes not inflamed  Nose patent no polyp seen   Mouth no inflammation or swelling of the lips tongue or in the throat noted.  No mucous membrane inflammation noted  Neck no masses or thyromegaly noted  Lymph nodes no significant cervical or epitrochlear lymphadenopathy noted  Lungs clear to auscultation  Heart no murmurs heard regular rhythm  Extremities no swelling or inflammation of the hands legs noted  Skin facial dermatitis and dermatitis of the upper anterior chest skin is observed.  No urticaria is noted.      Impression:    1. Dermatitis, cause uncertain  2. Allergic rhinitis  3. Multiple other health concerns as noted in her medical record     Assessment and plan:     Her appointment was 60 min in duration spent entirely in face-to-face contact.  More than 50% of the visit was spent in counseling and coordination of care.      We had a lengthy discussion regarding immediate hypersensitivity and delayed hypersensitivity reactions.    Her immediate hypersensitivity skin test performed May 26, 2006 documented the fact that genetic least she is an allergic individual.      I told her  I do not believe there is a very high likelihood that airborne exposure to the allergens which demonstrated positive skin test reactions are very likely to cause the dermatitis which she has experienced.  For  many years she experienced allergic rhinitis and did not have the type of dermatitis that she has experienced intermittently during the past 8 years.      Since childhood she does have a history suggestive of allergic contact dermatitis.  In the past metal jewelry provoked contact dermatitis to developed.  As an adult exposure to adhesives on bandages provoked contact dermatitis to developed.      I told her it may be helpful to consider having patch tests performed to determine whether not she exhibits any positive reactions.  I told her the treatment of choice for allergic contact dermatitis is avoidance.    We discussed the importance of skin hydration.  I suggested she keep the use topical products on her skin to an absolute minimum.  I suggested she consider application of a small amount of vegetable oil, such as olive oil, to help make her skin feel more comfortable should she find that her skin is dry.  I reviewed with her that the skin may require a number of weeks to gradually heal after experiencing an inflammatory process.      She was given the office phone number.  Should she have additional questions or concerns she was instructed to call.  She has a return appointment scheduled with Dr. Lion on December 17, 2018 for re-evaluation.

## 2018-10-22 ENCOUNTER — TELEPHONE (OUTPATIENT)
Dept: DERMATOLOGY | Facility: CLINIC | Age: 43
End: 2018-10-22

## 2018-10-22 NOTE — TELEPHONE ENCOUNTER
"Spoke to pharmacist at [facility] and she stated the rx was sent over as "LJ" but the patient had the script transferred out to another pharmacy.  "

## 2018-10-22 NOTE — TELEPHONE ENCOUNTER
----- Message from Lauren Lion MD sent at 10/22/2018  8:01 AM CDT -----  Contact: pt  protopic comes at a generic tacrolimus oint.  Please have pharmacy run it that way for her.  ----- Message -----  From: Jenny Garvin LPN  Sent: 10/19/2018  12:37 PM  To: Lauren Lion MD    Ok I called the pharmacy and they said the med is a brand and they wouldn't know if Elidel is cheaper until its called in. They were unable to give me a price.   ----- Message -----  From: Lauren Lion MD  Sent: 10/18/2018   4:38 PM  To: Ayad Aguilar Staff    Please call pt's pharmacy and see if that was for the generic and if so, see if elidel would be cheaper  ----- Message -----  From: Jenny Garvin LPN  Sent: 10/18/2018   4:10 PM  To: Lauren Lion MD    Please advise  ----- Message -----  From: Portia Riley  Sent: 10/18/2018   2:58 PM  To: Ayad Aguilar Staff    Pt req call back regarding samples for PROTOPIC 0.1 % ointment  Pt states cost is high pt wants to know if something else can be called in more resonable     ....[phone] (home)

## 2018-10-24 NOTE — TELEPHONE ENCOUNTER
Spoke to pt and she stated she had not got the medication yet because they needed a new script.  I called [facility] in [place], spoke to the pharmacist Kia and gave a verbal order for the generic protopic 0.1%.  Kia ran script and it was approved with a cost of $30.  I returned call to pt and informed her of the generic medication and cost.  Pt verbalized understanding of information given and had no further questions.

## 2018-11-19 ENCOUNTER — TELEPHONE (OUTPATIENT)
Dept: DERMATOLOGY | Facility: CLINIC | Age: 43
End: 2018-11-19

## 2019-02-26 ENCOUNTER — OFFICE VISIT (OUTPATIENT)
Dept: FAMILY MEDICINE | Facility: CLINIC | Age: 44
End: 2019-02-26
Payer: COMMERCIAL

## 2019-02-26 VITALS
SYSTOLIC BLOOD PRESSURE: 144 MMHG | OXYGEN SATURATION: 98 % | HEIGHT: 61 IN | HEART RATE: 90 BPM | TEMPERATURE: 98 F | WEIGHT: 239.44 LBS | DIASTOLIC BLOOD PRESSURE: 90 MMHG | BODY MASS INDEX: 45.21 KG/M2

## 2019-02-26 DIAGNOSIS — H18.891 CORNEAL IRRITATION OF RIGHT EYE: Primary | ICD-10-CM

## 2019-02-26 DIAGNOSIS — G35 MULTIPLE SCLEROSIS: ICD-10-CM

## 2019-02-26 PROCEDURE — 3080F PR MOST RECENT DIASTOLIC BLOOD PRESSURE >= 90 MM HG: ICD-10-PCS | Mod: CPTII,S$GLB,, | Performed by: REGISTERED NURSE

## 2019-02-26 PROCEDURE — 3008F BODY MASS INDEX DOCD: CPT | Mod: CPTII,S$GLB,, | Performed by: REGISTERED NURSE

## 2019-02-26 PROCEDURE — 3080F DIAST BP >= 90 MM HG: CPT | Mod: CPTII,S$GLB,, | Performed by: REGISTERED NURSE

## 2019-02-26 PROCEDURE — 3077F SYST BP >= 140 MM HG: CPT | Mod: CPTII,S$GLB,, | Performed by: REGISTERED NURSE

## 2019-02-26 PROCEDURE — 3077F PR MOST RECENT SYSTOLIC BLOOD PRESSURE >= 140 MM HG: ICD-10-PCS | Mod: CPTII,S$GLB,, | Performed by: REGISTERED NURSE

## 2019-02-26 PROCEDURE — 99213 OFFICE O/P EST LOW 20 MIN: CPT | Mod: S$GLB,,, | Performed by: REGISTERED NURSE

## 2019-02-26 PROCEDURE — 99999 PR PBB SHADOW E&M-EST. PATIENT-LVL IV: CPT | Mod: PBBFAC,,, | Performed by: REGISTERED NURSE

## 2019-02-26 PROCEDURE — 99999 PR PBB SHADOW E&M-EST. PATIENT-LVL IV: ICD-10-PCS | Mod: PBBFAC,,, | Performed by: REGISTERED NURSE

## 2019-02-26 PROCEDURE — 99213 PR OFFICE/OUTPT VISIT, EST, LEVL III, 20-29 MIN: ICD-10-PCS | Mod: S$GLB,,, | Performed by: REGISTERED NURSE

## 2019-02-26 PROCEDURE — 3008F PR BODY MASS INDEX (BMI) DOCUMENTED: ICD-10-PCS | Mod: CPTII,S$GLB,, | Performed by: REGISTERED NURSE

## 2019-02-26 RX ORDER — SULFACETAMIDE SODIUM 100 MG/ML
2 SOLUTION/ DROPS OPHTHALMIC 4 TIMES DAILY
Qty: 1 BOTTLE | Refills: 0 | Status: SHIPPED | OUTPATIENT
Start: 2019-02-26 | End: 2019-03-03

## 2019-02-26 NOTE — PROGRESS NOTES
Subjective:       Patient ID: Garret Jones is a 43 y.o. female.    Chief Complaint   Patient presents with    Eye irritation       Eye Problem    The right eye is affected. This is a new problem. The current episode started in the past 7 days. The problem has been unchanged. The injury mechanism was contact lenses. There is no known exposure to pink eye. She wears contacts. Associated symptoms include blurred vision, an eye discharge and eye redness. Pertinent negatives include no double vision, fever, foreign body sensation, itching or photophobia. She has tried nothing for the symptoms.         Review of Systems   Constitutional: Negative for fever.   Eyes: Positive for blurred vision, discharge and redness. Negative for double vision, photophobia and itching.       Review of patient's allergies indicates:   Allergen Reactions    Morphine Itching    Amlodipine      Other reaction(s): Headache  Other reaction(s): Edema    Amoxicillin-pot clavulanate Nausea And Vomiting    Codeine Hives and Itching    Hydrocodone Hives, Itching and Hallucinations    Latex Itching    Propoxyphene n-acetaminophen Itching    Tuberculin ppd Hives and Itching       Patient Active Problem List   Diagnosis    HTN (hypertension)    Hyperlipidemia    Allergic rhinitis    Hypothyroidism    Migraine headache    Iron deficiency anemia    Insulin resistance    Morbid obesity with BMI of 40.0-44.9, adult    Adjustment disorder with mixed anxiety and depressed mood    Insomnia    Vitamin D deficiency disease    Multiple sclerosis       Current Outpatient Medications on File Prior to Visit   Medication Sig Dispense Refill    ALPRAZolam (XANAX) 0.5 MG tablet Take 1 tablet (0.5 mg total) by mouth daily as needed for Anxiety. 30 tablet 0    benazepril-hydrochlorthiazide (LOTENSIN HCT) 20-12.5 mg per tablet TAKE TWO TABLETS BY MOUTH ONCE DAILY 180 tablet 1    canagliflozin (INVOKANA) 300 mg Tab tablet Take 1 tablet  "(300 mg total) by mouth once daily. 90 tablet 1    cyclobenzaprine (FLEXERIL) 10 MG tablet TAKE 1 TABLET BY MOUTH THREE TIMES DAILY AS NEEDED FOR MUSCLE SPASMS 90 tablet 0    dimethyl fumarate (TECFIDERA) 240 mg CpDR Take 240 mg by mouth 2 (two) times daily.      ergocalciferol (ERGOCALCIFEROL) 50,000 unit Cap onecap twice a week 24 capsule 1    fexofenadine (ALLEGRA) 180 MG tablet Take 1 tablet (180 mg total) by mouth once daily. 30 tablet 6    levocetirizine (XYZAL) 5 MG tablet Take 1 tablet (5 mg total) by mouth every morning. 30 tablet 6    levothyroxine (SYNTHROID) 150 MCG tablet Take 150 mcg by mouth once daily.      meloxicam (MOBIC) 15 MG tablet Take 1 tablet (15 mg total) by mouth once daily. 30 tablet 6    metoprolol succinate (TOPROL-XL) 50 MG 24 hr tablet Take 1 tablet (50 mg total) by mouth once daily. 90 tablet 1    SITagliptin (JANUVIA) 100 MG Tab Take 1 tablet (100 mg total) by mouth once daily. 90 tablet 1    zolpidem (AMBIEN) 10 mg Tab Take 1 tablet (10 mg total) by mouth nightly as needed. 30 tablet 2     No current facility-administered medications on file prior to visit.        Past medical, surgical, family and social histories have been reviewed today.        Objective:     Vitals:    02/26/19 1534   BP: (!) 144/90   Pulse: 90   Temp: 98.2 °F (36.8 °C)   TempSrc: Oral   SpO2: 98%   Weight: 108.6 kg (239 lb 6.7 oz)   Height: 5' 1" (1.549 m)   PainSc: 0-No pain         Physical Exam   Constitutional: She appears well-developed and well-nourished.   Eyes: Pupils are equal, round, and reactive to light. EOM and lids are normal. Right conjunctiva is injected. Right conjunctiva has no hemorrhage. Left conjunctiva is not injected. Left conjunctiva has no hemorrhage.       Vitals reviewed.        Diagnosis       1. Corneal irritation of right eye    2. Multiple sclerosis          Assessment/ Plan     Corneal irritation of right eye  -     sulfacetamide sodium 10% (BLEPH-10) 10 % ophthalmic " solution; Place 2 drops into the right eye 4 (four) times daily. for 5 days  Dispense: 1 Bottle; Refill: 0    Multiple sclerosis  · Stable, followed by Dr. Plata.        Eye care discussed.  Follow-up in clinic as needed.          ELIJAH Serra  [facility]

## 2019-04-13 ENCOUNTER — OFFICE VISIT (OUTPATIENT)
Dept: URGENT CARE | Facility: CLINIC | Age: 44
End: 2019-04-13
Payer: COMMERCIAL

## 2019-04-13 VITALS
HEIGHT: 61 IN | HEART RATE: 97 BPM | BODY MASS INDEX: 44.25 KG/M2 | WEIGHT: 234.38 LBS | SYSTOLIC BLOOD PRESSURE: 130 MMHG | DIASTOLIC BLOOD PRESSURE: 84 MMHG | TEMPERATURE: 99 F

## 2019-04-13 DIAGNOSIS — J20.9 ACUTE BRONCHITIS, UNSPECIFIED ORGANISM: ICD-10-CM

## 2019-04-13 DIAGNOSIS — J01.90 ACUTE SINUSITIS, RECURRENCE NOT SPECIFIED, UNSPECIFIED LOCATION: Primary | ICD-10-CM

## 2019-04-13 PROCEDURE — 3079F DIAST BP 80-89 MM HG: CPT | Mod: CPTII,S$GLB,, | Performed by: NURSE PRACTITIONER

## 2019-04-13 PROCEDURE — 3008F PR BODY MASS INDEX (BMI) DOCUMENTED: ICD-10-PCS | Mod: CPTII,S$GLB,, | Performed by: NURSE PRACTITIONER

## 2019-04-13 PROCEDURE — 99999 PR PBB SHADOW E&M-EST. PATIENT-LVL V: ICD-10-PCS | Mod: PBBFAC,,, | Performed by: NURSE PRACTITIONER

## 2019-04-13 PROCEDURE — 94640 AIRWAY INHALATION TREATMENT: CPT | Mod: 59,S$GLB,, | Performed by: NURSE PRACTITIONER

## 2019-04-13 PROCEDURE — 99214 PR OFFICE/OUTPT VISIT, EST, LEVL IV, 30-39 MIN: ICD-10-PCS | Mod: 25,S$GLB,, | Performed by: NURSE PRACTITIONER

## 2019-04-13 PROCEDURE — 96372 THER/PROPH/DIAG INJ SC/IM: CPT | Mod: S$GLB,,, | Performed by: NURSE PRACTITIONER

## 2019-04-13 PROCEDURE — 3075F PR MOST RECENT SYSTOLIC BLOOD PRESS GE 130-139MM HG: ICD-10-PCS | Mod: CPTII,S$GLB,, | Performed by: NURSE PRACTITIONER

## 2019-04-13 PROCEDURE — 3008F BODY MASS INDEX DOCD: CPT | Mod: CPTII,S$GLB,, | Performed by: NURSE PRACTITIONER

## 2019-04-13 PROCEDURE — 99214 OFFICE O/P EST MOD 30 MIN: CPT | Mod: 25,S$GLB,, | Performed by: NURSE PRACTITIONER

## 2019-04-13 PROCEDURE — 3075F SYST BP GE 130 - 139MM HG: CPT | Mod: CPTII,S$GLB,, | Performed by: NURSE PRACTITIONER

## 2019-04-13 PROCEDURE — 99999 PR PBB SHADOW E&M-EST. PATIENT-LVL V: CPT | Mod: PBBFAC,,, | Performed by: NURSE PRACTITIONER

## 2019-04-13 PROCEDURE — 3079F PR MOST RECENT DIASTOLIC BLOOD PRESSURE 80-89 MM HG: ICD-10-PCS | Mod: CPTII,S$GLB,, | Performed by: NURSE PRACTITIONER

## 2019-04-13 PROCEDURE — 96372 PR INJECTION,THERAP/PROPH/DIAG2ST, IM OR SUBCUT: ICD-10-PCS | Mod: S$GLB,,, | Performed by: NURSE PRACTITIONER

## 2019-04-13 PROCEDURE — 94640 PR INHAL RX, AIRWAY OBST/DX SPUTUM INDUCT: ICD-10-PCS | Mod: 59,S$GLB,, | Performed by: NURSE PRACTITIONER

## 2019-04-13 RX ORDER — BETAMETHASONE SODIUM PHOSPHATE AND BETAMETHASONE ACETATE 3; 3 MG/ML; MG/ML
6 INJECTION, SUSPENSION INTRA-ARTICULAR; INTRALESIONAL; INTRAMUSCULAR; SOFT TISSUE ONCE
Status: COMPLETED | OUTPATIENT
Start: 2019-04-13 | End: 2019-04-13

## 2019-04-13 RX ORDER — ALBUTEROL SULFATE 0.83 MG/ML
2.5 SOLUTION RESPIRATORY (INHALATION) ONCE
Status: COMPLETED | OUTPATIENT
Start: 2019-04-13 | End: 2019-04-13

## 2019-04-13 RX ORDER — CROMOLYN SODIUM 40 MG/ML
SOLUTION/ DROPS OPHTHALMIC
Qty: 10 ML | Refills: 0 | Status: SHIPPED | OUTPATIENT
Start: 2019-04-13 | End: 2019-05-29

## 2019-04-13 RX ORDER — ALBUTEROL SULFATE 90 UG/1
1-2 AEROSOL, METERED RESPIRATORY (INHALATION) EVERY 6 HOURS PRN
Qty: 1 INHALER | Refills: 1 | Status: SHIPPED | OUTPATIENT
Start: 2019-04-13 | End: 2019-10-29 | Stop reason: SDUPTHER

## 2019-04-13 RX ORDER — ALBUTEROL SULFATE 0.83 MG/ML
2.5 SOLUTION RESPIRATORY (INHALATION) ONCE
Status: DISCONTINUED | OUTPATIENT
Start: 2019-04-13 | End: 2019-04-13

## 2019-04-13 RX ORDER — ALBUTEROL SULFATE 2.5 MG/.5ML
2.5 SOLUTION RESPIRATORY (INHALATION) ONCE
Status: DISCONTINUED | OUTPATIENT
Start: 2019-04-13 | End: 2019-04-13

## 2019-04-13 RX ORDER — BENZONATATE 200 MG/1
200 CAPSULE ORAL 3 TIMES DAILY PRN
Qty: 20 CAPSULE | Refills: 0 | Status: SHIPPED | OUTPATIENT
Start: 2019-04-13 | End: 2019-04-20

## 2019-04-13 RX ORDER — AZITHROMYCIN 250 MG/1
250 TABLET, FILM COATED ORAL DAILY
Qty: 6 TABLET | Refills: 0 | Status: SHIPPED | OUTPATIENT
Start: 2019-04-13 | End: 2019-05-29

## 2019-04-13 RX ADMIN — BETAMETHASONE SODIUM PHOSPHATE AND BETAMETHASONE ACETATE 6 MG: 3; 3 INJECTION, SUSPENSION INTRA-ARTICULAR; INTRALESIONAL; INTRAMUSCULAR; SOFT TISSUE at 10:04

## 2019-04-13 RX ADMIN — ALBUTEROL SULFATE 2.5 MG: 0.83 SOLUTION RESPIRATORY (INHALATION) at 10:04

## 2019-04-13 NOTE — PROGRESS NOTES
Subjective:       Patient ID: Garret Jones is a 43 y.o. female.    Chief Complaint: Cough; Chest Congestion; Sore Throat (with post nasal drip ); and Wheezing    Patient presents with sinusitis, cough, congestion and wheezing that started about 3 weeks.  Tried Allegra, Claritin and Coricidin HBP.  Has history of bronchitis.    URI    This is a new problem. The current episode started 1 to 4 weeks ago. The problem has been gradually worsening. Associated symptoms include congestion, coughing, rhinorrhea, sinus pain, a sore throat and wheezing. She has tried antihistamine for the symptoms. The treatment provided mild relief.     Review of Systems   Constitutional: Negative for chills and fatigue.   HENT: Positive for congestion, rhinorrhea, sinus pain and sore throat.    Respiratory: Positive for cough and wheezing.    Musculoskeletal: Negative for arthralgias and gait problem.   Psychiatric/Behavioral: Negative for agitation and confusion.       Objective:      Physical Exam   Constitutional: She is oriented to person, place, and time. Vital signs are normal. She appears well-developed and well-nourished.   HENT:   Head: Normocephalic and atraumatic.   Right Ear: A middle ear effusion is present.   Left Ear: A middle ear effusion is present.   Nose: Mucosal edema and rhinorrhea present.   Mouth/Throat: Uvula is midline.   Neck: Normal range of motion.   Cardiovascular: Normal rate and regular rhythm.   Pulmonary/Chest: Effort normal. She has wheezes.   Improved after breathing treatment    Musculoskeletal: Normal range of motion.   Neurological: She is alert and oriented to person, place, and time.   Skin: Skin is warm.   Psychiatric: She has a normal mood and affect. Her behavior is normal.       Assessment:       1. Acute sinusitis, recurrence not specified, unspecified location    2. Acute bronchitis, unspecified organism        Plan:           Acute sinusitis, recurrence not specified, unspecified  location  -     azithromycin (Z-LARRY) 250 MG tablet; Take 1 tablet (250 mg total) by mouth once daily. Take 2 tablets by mouth on day 1, then one tablet daily on days 2-5.  Dispense: 6 tablet; Refill: 0  -     betamethasone acetate-betamethasone sodium phosphate injection 6 mg  -     cromolyn (OPTICROM) 4 % ophthalmic solution; 1-2 drops into both eyes 4 times a day  Dispense: 10 mL; Refill: 0    Acute bronchitis, unspecified organism  -     albuterol (PROVENTIL/VENTOLIN HFA) 90 mcg/actuation inhaler; Inhale 1-2 puffs into the lungs every 6 (six) hours as needed for Wheezing (Cough).  Dispense: 1 Inhaler; Refill: 1  -     betamethasone acetate-betamethasone sodium phosphate injection 6 mg  -     benzonatate (TESSALON) 200 MG capsule; Take 1 capsule (200 mg total) by mouth 3 (three) times daily as needed for Cough.  Dispense: 20 capsule; Refill: 0  -     Discontinue: albuterol nebulizer solution 2.5 mg  -     Discontinue: albuterol sulfate nebulizer solution 2.5 mg  -     albuterol nebulizer solution 2.5 mg        Instructed to take all medications as ordered.  Informed if no improvement or symptoms worsens to follow up with primary care physician.  Informed to hydrate and rest.  Printed and review after visit summary with patient.

## 2019-04-17 ENCOUNTER — TELEPHONE (OUTPATIENT)
Dept: FAMILY MEDICINE | Facility: CLINIC | Age: 44
End: 2019-04-17

## 2019-04-17 RX ORDER — FLUCONAZOLE 150 MG/1
150 TABLET ORAL DAILY
Qty: 1 TABLET | Refills: 0 | Status: SHIPPED | OUTPATIENT
Start: 2019-04-17 | End: 2019-04-18

## 2019-04-17 NOTE — TELEPHONE ENCOUNTER
S/W Pt and pt said she went UC and see Kamryn Norris and she gave her an antibiotic and pt is requesting diflucan to go with the antibiotic that she was prescribed.//rf

## 2019-04-17 NOTE — TELEPHONE ENCOUNTER
----- Message from Leta Jacobson sent at 4/17/2019  7:45 AM CDT -----  Contact: self/[phone]  Would like to consult with nurse regarding a possible yeast infection and need medication, please call back at [phone]. Thanks/ar

## 2019-04-17 NOTE — TELEPHONE ENCOUNTER
"Pt notified of RX "diflucan" was sent to the pharmacy. Pt verbalized understanding and had no further questions.//rf  "

## 2019-05-29 ENCOUNTER — OFFICE VISIT (OUTPATIENT)
Dept: DERMATOLOGY | Facility: CLINIC | Age: 44
End: 2019-05-29
Payer: COMMERCIAL

## 2019-05-29 DIAGNOSIS — L73.9 FOLLICULITIS: Primary | ICD-10-CM

## 2019-05-29 DIAGNOSIS — L81.0 POST-INFLAMMATORY HYPERPIGMENTATION: ICD-10-CM

## 2019-05-29 PROCEDURE — 99999 PR PBB SHADOW E&M-EST. PATIENT-LVL II: CPT | Mod: PBBFAC,,, | Performed by: DERMATOLOGY

## 2019-05-29 PROCEDURE — 99999 PR PBB SHADOW E&M-EST. PATIENT-LVL II: ICD-10-PCS | Mod: PBBFAC,,, | Performed by: DERMATOLOGY

## 2019-05-29 PROCEDURE — 87070 CULTURE OTHR SPECIMN AEROBIC: CPT

## 2019-05-29 PROCEDURE — 99213 OFFICE O/P EST LOW 20 MIN: CPT | Mod: S$GLB,,, | Performed by: DERMATOLOGY

## 2019-05-29 PROCEDURE — 99213 PR OFFICE/OUTPT VISIT, EST, LEVL III, 20-29 MIN: ICD-10-PCS | Mod: S$GLB,,, | Performed by: DERMATOLOGY

## 2019-05-29 RX ORDER — CLINDAMYCIN PHOSPHATE 10 MG/ML
SOLUTION TOPICAL 2 TIMES DAILY
Qty: 60 EACH | Refills: 3 | Status: SHIPPED | OUTPATIENT
Start: 2019-05-29 | End: 2021-11-09

## 2019-05-29 RX ORDER — DOXYCYCLINE 100 MG/1
CAPSULE ORAL
Qty: 14 CAPSULE | Refills: 0 | Status: SHIPPED | OUTPATIENT
Start: 2019-05-29 | End: 2019-06-04 | Stop reason: ALTCHOICE

## 2019-05-29 RX ORDER — HYDROQUINONE 40 MG/G
CREAM TOPICAL
Qty: 28 G | Refills: 1 | Status: SHIPPED | OUTPATIENT
Start: 2019-05-29 | End: 2021-05-13

## 2019-05-29 RX ORDER — TACROLIMUS 1 MG/G
OINTMENT TOPICAL
Refills: 2 | COMMUNITY
Start: 2019-05-01 | End: 2019-10-29 | Stop reason: SDUPTHER

## 2019-05-29 RX ORDER — FLUCONAZOLE 150 MG/1
150 TABLET ORAL DAILY
Qty: 1 TABLET | Refills: 1 | Status: SHIPPED | OUTPATIENT
Start: 2019-05-29 | End: 2019-05-30

## 2019-05-29 NOTE — PATIENT INSTRUCTIONS
Start broad spectrum sunscreen with SPF 50 and zinc oxide and/or titanium dioxide.  Use sunscreen daily.      To prevent further spread to other skin sites or to other people in your household, I recommend you: launder your bath towels in hot water after each use, you should not re-use bath towels, and that you wash your sheets once/week and be careful of sharing objects with others which could spread infection.  Please send a message via [employer] or call with any questions [phone].

## 2019-05-29 NOTE — PROGRESS NOTES
Subjective:       Patient ID:  Garret Jones is a 43 y.o. female who presents for   Chief Complaint   Patient presents with    Skin Discoloration     dark spots of axilla x 7 months, after waxing     Hx of allergic contact dermatitis of the face and eyelids, last seen on 10/17/18.  She is currently using protopic 0.1% ointment, CeraVe and changes in detergent with improvement of the rash of the face.  She c/o rash of the bilateral axilla x 7 months, with worsening with waxing q 4 weeks.         Review of Systems   Constitutional: Negative for fever and chills.   Gastrointestinal: Negative for nausea and vomiting.   Skin: Negative for daily sunscreen use, activity-related sunscreen use and recent sunburn.   Hematologic/Lymphatic: Does not bruise/bleed easily.        Objective:    Physical Exam   Constitutional: She appears well-developed and well-nourished. No distress.   Neurological: She is alert and oriented to person, place, and time. She is not disoriented.   Psychiatric: She has a normal mood and affect.   Skin:   Areas Examined (abnormalities noted in diagram):   Head / Face Inspection Performed  Neck Inspection Performed  Chest / Axilla Inspection Performed  Abdomen Inspection Performed  Back Inspection Performed  RUE Inspected  LUE Inspection Performed  Nails and Digits Inspection Performed                 Assessment / Plan:        Folliculitis  -     clindamycin (CLEOCIN T) 1 % Swab; Apply topically 2 (two) times daily. For rash under arms  Dispense: 60 each; Refill: 3  -      Mostly resolved. Few pustules, will start above med.      Post-inflammatory hyperpigmentation  -     hydroquinone 4 % Crea; Apply to dark spots once daily  Dispense: 28 g; Refill: 1  -     Will start above med for residual dark spots.              Follow up in about 3 months (around 8/29/2019).

## 2019-06-01 LAB — BACTERIA SPEC AEROBE CULT: NORMAL

## 2019-06-02 RX ORDER — MELOXICAM 15 MG/1
TABLET ORAL
Qty: 30 TABLET | Refills: 6 | Status: SHIPPED | OUTPATIENT
Start: 2019-06-02 | End: 2019-10-29 | Stop reason: SDUPTHER

## 2019-06-02 RX ORDER — CYCLOBENZAPRINE HCL 10 MG
TABLET ORAL
Qty: 90 TABLET | Refills: 0 | Status: SHIPPED | OUTPATIENT
Start: 2019-06-02 | End: 2019-10-29 | Stop reason: SDUPTHER

## 2019-06-04 PROBLEM — F41.1 GAD (GENERALIZED ANXIETY DISORDER): Status: ACTIVE | Noted: 2019-06-04

## 2019-07-01 RX ORDER — FEXOFENADINE HYDROCHLORIDE 180 MG/1
TABLET, FILM COATED ORAL
Qty: 30 TABLET | Refills: 6 | Status: SHIPPED | OUTPATIENT
Start: 2019-07-01 | End: 2019-10-29 | Stop reason: SDUPTHER

## 2019-07-24 ENCOUNTER — OFFICE VISIT (OUTPATIENT)
Dept: FAMILY MEDICINE | Facility: CLINIC | Age: 44
End: 2019-07-24
Payer: COMMERCIAL

## 2019-07-24 VITALS
TEMPERATURE: 98 F | OXYGEN SATURATION: 100 % | HEIGHT: 61 IN | SYSTOLIC BLOOD PRESSURE: 128 MMHG | WEIGHT: 244.25 LBS | HEART RATE: 88 BPM | DIASTOLIC BLOOD PRESSURE: 77 MMHG | BODY MASS INDEX: 46.11 KG/M2

## 2019-07-24 DIAGNOSIS — R60.0 BILATERAL LOWER EXTREMITY EDEMA: ICD-10-CM

## 2019-07-24 DIAGNOSIS — E55.9 VITAMIN D DEFICIENCY DISEASE: ICD-10-CM

## 2019-07-24 DIAGNOSIS — E88.819 INSULIN RESISTANCE: ICD-10-CM

## 2019-07-24 DIAGNOSIS — N39.0 URINARY TRACT INFECTION WITHOUT HEMATURIA, SITE UNSPECIFIED: Primary | ICD-10-CM

## 2019-07-24 DIAGNOSIS — R39.9 URINARY SYMPTOM OR SIGN: ICD-10-CM

## 2019-07-24 PROBLEM — F41.1 GAD (GENERALIZED ANXIETY DISORDER): Status: RESOLVED | Noted: 2019-06-04 | Resolved: 2019-07-24

## 2019-07-24 LAB
BILIRUB SERPL-MCNC: ABNORMAL MG/DL
BLOOD URINE, POC: ABNORMAL
COLOR, POC UA: ABNORMAL
GLUCOSE UR QL STRIP: 1000
KETONES UR QL STRIP: ABNORMAL
LEUKOCYTE ESTERASE URINE, POC: ABNORMAL
NITRITE, POC UA: ABNORMAL
PH, POC UA: 7
PROTEIN, POC: ABNORMAL
SPECIFIC GRAVITY, POC UA: 1.01
UROBILINOGEN, POC UA: ABNORMAL

## 2019-07-24 PROCEDURE — 3008F BODY MASS INDEX DOCD: CPT | Mod: CPTII,S$GLB,, | Performed by: REGISTERED NURSE

## 2019-07-24 PROCEDURE — 81002 POCT URINE DIPSTICK WITHOUT MICROSCOPE: ICD-10-PCS | Mod: S$GLB,,, | Performed by: REGISTERED NURSE

## 2019-07-24 PROCEDURE — 99999 PR PBB SHADOW E&M-EST. PATIENT-LVL IV: ICD-10-PCS | Mod: PBBFAC,,, | Performed by: REGISTERED NURSE

## 2019-07-24 PROCEDURE — 87086 URINE CULTURE/COLONY COUNT: CPT

## 2019-07-24 PROCEDURE — 3008F PR BODY MASS INDEX (BMI) DOCUMENTED: ICD-10-PCS | Mod: CPTII,S$GLB,, | Performed by: REGISTERED NURSE

## 2019-07-24 PROCEDURE — 3074F SYST BP LT 130 MM HG: CPT | Mod: CPTII,S$GLB,, | Performed by: REGISTERED NURSE

## 2019-07-24 PROCEDURE — 99214 OFFICE O/P EST MOD 30 MIN: CPT | Mod: 25,S$GLB,, | Performed by: REGISTERED NURSE

## 2019-07-24 PROCEDURE — 99999 PR PBB SHADOW E&M-EST. PATIENT-LVL IV: CPT | Mod: PBBFAC,,, | Performed by: REGISTERED NURSE

## 2019-07-24 PROCEDURE — 3078F PR MOST RECENT DIASTOLIC BLOOD PRESSURE < 80 MM HG: ICD-10-PCS | Mod: CPTII,S$GLB,, | Performed by: REGISTERED NURSE

## 2019-07-24 PROCEDURE — 81002 URINALYSIS NONAUTO W/O SCOPE: CPT | Mod: S$GLB,,, | Performed by: REGISTERED NURSE

## 2019-07-24 PROCEDURE — 99214 PR OFFICE/OUTPT VISIT, EST, LEVL IV, 30-39 MIN: ICD-10-PCS | Mod: 25,S$GLB,, | Performed by: REGISTERED NURSE

## 2019-07-24 PROCEDURE — 3074F PR MOST RECENT SYSTOLIC BLOOD PRESSURE < 130 MM HG: ICD-10-PCS | Mod: CPTII,S$GLB,, | Performed by: REGISTERED NURSE

## 2019-07-24 PROCEDURE — 3078F DIAST BP <80 MM HG: CPT | Mod: CPTII,S$GLB,, | Performed by: REGISTERED NURSE

## 2019-07-24 RX ORDER — SULFAMETHOXAZOLE AND TRIMETHOPRIM 800; 160 MG/1; MG/1
1 TABLET ORAL 2 TIMES DAILY
Qty: 20 TABLET | Refills: 0 | Status: SHIPPED | OUTPATIENT
Start: 2019-07-24 | End: 2019-08-03

## 2019-07-24 RX ORDER — METFORMIN HYDROCHLORIDE 500 MG/1
500 TABLET, EXTENDED RELEASE ORAL
Qty: 90 TABLET | Refills: 1 | Status: SHIPPED | OUTPATIENT
Start: 2019-07-24 | End: 2019-10-29 | Stop reason: SDUPTHER

## 2019-07-24 RX ORDER — HYDROCHLOROTHIAZIDE 12.5 MG/1
12.5 CAPSULE ORAL DAILY PRN
Qty: 30 CAPSULE | Refills: 2 | Status: SHIPPED | OUTPATIENT
Start: 2019-07-24 | End: 2019-10-29 | Stop reason: SDUPTHER

## 2019-07-26 LAB
BACTERIA UR CULT: NORMAL
BACTERIA UR CULT: NORMAL

## 2019-07-31 NOTE — PROGRESS NOTES
Subjective:       Patient ID: Garret Jones is a 43 y.o. female.    Chief Complaint   Patient presents with    Urinary Tract Infection       HPI    Garret Jones is here today with c/o possible UTI.  She has been seeing Dr. Bartlett for IRS.  Has been told she has blood in her urine and will be having a full work-up soon. She is currently on Farxiga for the IRS.  However, she does note that since starting the medication, she has had more urinary issues than usual.  She has been on metformin previously and did well, no GI symptoms reported.  Also with c/o intermittent BLE and puffiness.  Tries to elevate some, low-salt intake.  Requests labs today.      Review of Systems   Constitutional: Negative for chills and fever.   Respiratory: Negative.    Cardiovascular: Negative.    Endocrine: Negative for polydipsia, polyphagia and polyuria.   Genitourinary: Positive for dysuria, frequency, pelvic pain and urgency. Negative for flank pain and hematuria.   Neurological: Negative.        Review of patient's allergies indicates:   Allergen Reactions    Morphine Itching    Amlodipine      Other reaction(s): Headache  Other reaction(s): Edema    Amoxicillin-pot clavulanate Nausea And Vomiting    Codeine Hives and Itching    Hydrocodone Hives, Itching and Hallucinations    Latex Itching    Propoxyphene n-acetaminophen Itching    Tuberculin ppd Hives and Itching       Patient Active Problem List   Diagnosis    HTN (hypertension)    Hyperlipidemia    Allergic rhinitis    Hypothyroidism    Migraine headache    Iron deficiency anemia    Insulin resistance    Morbid obesity with BMI of 40.0-44.9, adult    Adjustment disorder with mixed anxiety and depressed mood    Insomnia    Vitamin D deficiency disease    Multiple sclerosis       Current Outpatient Medications on File Prior to Visit   Medication Sig Dispense Refill    albuterol (PROVENTIL/VENTOLIN HFA) 90 mcg/actuation inhaler Inhale 1-2 puffs  "into the lungs every 6 (six) hours as needed for Wheezing (Cough). 1 Inhaler 1    ALLERGY RELIEF, FEXOFENADINE, 180 mg tablet TAKE 1 TABLET BY MOUTH ONCE DAILY 30 tablet 6    ALPRAZolam (XANAX) 0.5 MG tablet Take 1 tablet (0.5 mg total) by mouth daily as needed for Anxiety. 30 tablet 0    benazepril-hydrochlorthiazide (LOTENSIN HCT) 20-12.5 mg per tablet TAKE TWO TABLETS BY MOUTH ONCE DAILY 180 tablet 1    clindamycin (CLEOCIN T) 1 % Swab Apply topically 2 (two) times daily. For rash under arms 60 each 3    cyclobenzaprine (FLEXERIL) 10 MG tablet TAKE 1 TABLET BY MOUTH THREE TIMES DAILY AS NEEDED FOR MUSCLE SPASM 90 tablet 0    ergocalciferol (ERGOCALCIFEROL) 50,000 unit Cap onecap twice a week 24 capsule 1    hydroquinone 4 % Crea Apply to dark spots once daily 28 g 1    meloxicam (MOBIC) 15 MG tablet TAKE 1 TABLET BY MOUTH ONCE DAILY 30 tablet 6    metoprolol succinate (TOPROL-XL) 50 MG 24 hr tablet Take 1 tablet (50 mg total) by mouth once daily. 90 tablet 1    tacrolimus (PROTOPIC) 0.1 % ointment APPLY AA BID  2    zolpidem (AMBIEN) 5 MG Tab Take 5 mg by mouth every evening.  5    buPROPion (WELLBUTRIN XL) 300 MG 24 hr tablet Take 1 tablet (300 mg total) by mouth every morning. 30 tablet 5     No current facility-administered medications on file prior to visit.          Past medical, surgical, family and social histories have been reviewed today.        Objective:     Vitals:    07/24/19 1541   BP: 128/77   Pulse: 88   Temp: 98.4 °F (36.9 °C)   SpO2: 100%   Weight: 110.8 kg (244 lb 4.3 oz)   Height: 5' 1" (1.549 m)   PainSc:   3         Physical Exam   Constitutional: She is oriented to person, place, and time. She appears well-developed and well-nourished.   HENT:   Head: Normocephalic and atraumatic.   Cardiovascular: Normal rate and regular rhythm.   Pulmonary/Chest: Effort normal and breath sounds normal.   Abdominal: There is tenderness in the suprapubic area. There is no CVA tenderness. "   Neurological: She is alert and oriented to person, place, and time.   Vitals reviewed.      Component      Latest Ref Rng & Units 7/24/2019   Color, UA       yellow/clear   Spec Grav UA       1.010   pH, UA       7   WBC, UA       trace   Nitrite, UA       +   Protein       -   Glucose, UA       1,000   Ketones, UA       -   Urobilinogen, UA       -   Bilirubin       -   RBC, UA       -       Diagnosis       1. Urinary tract infection without hematuria, site unspecified    2. Urinary symptom or sign    3. Insulin resistance    4. Vitamin D deficiency disease    5. Bilateral lower extremity edema          Assessment/ Plan     Urinary tract infection without hematuria, site unspecified  -     Urine culture  -     sulfamethoxazole-trimethoprim 800-160mg (BACTRIM DS) 800-160 mg Tab; Take 1 tablet by mouth 2 (two) times daily. for 10 days  Dispense: 20 tablet; Refill: 0    Urinary symptom or sign  -     POCT urine dipstick without microscope    Insulin resistance  -     metFORMIN (GLUCOPHAGE-XR) 500 MG 24 hr tablet; Take 1 tablet (500 mg total) by mouth daily with dinner or evening meal.  Dispense: 90 tablet; Refill: 1  -     Comprehensive metabolic panel; Future; Expected date: 07/24/2019  -     Vitamin D; Future; Expected date: 07/24/2019  -     Hemoglobin A1c; Future; Expected date: 07/24/2019  -     Insulin, random; Future; Expected date: 07/24/2019    Vitamin D deficiency disease  -     Vitamin D; Future; Expected date: 07/24/2019    Bilateral lower extremity edema  -     hydroCHLOROthiazide (MICROZIDE) 12.5 mg capsule; Take 1 capsule (12.5 mg total) by mouth daily as needed.  Dispense: 30 capsule; Refill: 2        Urine positive for infection today and large amount of glucose due to Farxiga.  Stop Farxiga, started on metformin for IRS.  Lab pending.  Healthy diet, regular exercise, weight reduction.  Further treatment plan pending lab results.  Follow-up in clinic as needed.      Andrea Cothern, CFNP [facility]

## 2019-09-11 ENCOUNTER — OFFICE VISIT (OUTPATIENT)
Dept: FAMILY MEDICINE | Facility: CLINIC | Age: 44
End: 2019-09-11
Payer: COMMERCIAL

## 2019-09-11 VITALS
DIASTOLIC BLOOD PRESSURE: 82 MMHG | WEIGHT: 235.69 LBS | TEMPERATURE: 99 F | BODY MASS INDEX: 44.53 KG/M2 | SYSTOLIC BLOOD PRESSURE: 118 MMHG | HEART RATE: 110 BPM

## 2019-09-11 DIAGNOSIS — L02.32 BOIL OF BUTTOCK: Primary | ICD-10-CM

## 2019-09-11 PROCEDURE — 3074F PR MOST RECENT SYSTOLIC BLOOD PRESSURE < 130 MM HG: ICD-10-PCS | Mod: CPTII,S$GLB,, | Performed by: REGISTERED NURSE

## 2019-09-11 PROCEDURE — 99213 OFFICE O/P EST LOW 20 MIN: CPT | Mod: S$GLB,,, | Performed by: REGISTERED NURSE

## 2019-09-11 PROCEDURE — 3074F SYST BP LT 130 MM HG: CPT | Mod: CPTII,S$GLB,, | Performed by: REGISTERED NURSE

## 2019-09-11 PROCEDURE — 3008F BODY MASS INDEX DOCD: CPT | Mod: CPTII,S$GLB,, | Performed by: REGISTERED NURSE

## 2019-09-11 PROCEDURE — 3079F DIAST BP 80-89 MM HG: CPT | Mod: CPTII,S$GLB,, | Performed by: REGISTERED NURSE

## 2019-09-11 PROCEDURE — 99999 PR PBB SHADOW E&M-EST. PATIENT-LVL II: ICD-10-PCS | Mod: PBBFAC,,, | Performed by: REGISTERED NURSE

## 2019-09-11 PROCEDURE — 3008F PR BODY MASS INDEX (BMI) DOCUMENTED: ICD-10-PCS | Mod: CPTII,S$GLB,, | Performed by: REGISTERED NURSE

## 2019-09-11 PROCEDURE — 99213 PR OFFICE/OUTPT VISIT, EST, LEVL III, 20-29 MIN: ICD-10-PCS | Mod: S$GLB,,, | Performed by: REGISTERED NURSE

## 2019-09-11 PROCEDURE — 3079F PR MOST RECENT DIASTOLIC BLOOD PRESSURE 80-89 MM HG: ICD-10-PCS | Mod: CPTII,S$GLB,, | Performed by: REGISTERED NURSE

## 2019-09-11 PROCEDURE — 99999 PR PBB SHADOW E&M-EST. PATIENT-LVL II: CPT | Mod: PBBFAC,,, | Performed by: REGISTERED NURSE

## 2019-09-11 RX ORDER — SULFAMETHOXAZOLE AND TRIMETHOPRIM 800; 160 MG/1; MG/1
1 TABLET ORAL 2 TIMES DAILY
Qty: 20 TABLET | Refills: 0
Start: 2019-09-11 | End: 2019-09-21

## 2019-09-11 RX ORDER — MUPIROCIN 20 MG/G
OINTMENT TOPICAL 3 TIMES DAILY
Qty: 30 G | Refills: 0
Start: 2019-09-11 | End: 2019-09-21

## 2019-09-11 NOTE — PROGRESS NOTES
Subjective:       Patient ID: Garret Jones is a 43 y.o. female.    Chief Complaint   Patient presents with    Possible boil       HPI    Garret is here today with c/o boil to RT buttock x few days.  Pain currently 3/10 on pain scale.  Area itchy, tender w/ slight bloody drainage.  She has been soaking in the hot tub to help with irritation.  She thinks that recent trip to a water park triggered.    Review of Systems   Constitutional: Negative for chills and fever.   Skin: Positive for wound.         Review of patient's allergies indicates:   Allergen Reactions    Morphine Itching    Amlodipine      Other reaction(s): Headache  Other reaction(s): Edema    Amoxicillin-pot clavulanate Nausea And Vomiting    Codeine Hives and Itching    Hydrocodone Hives, Itching and Hallucinations    Latex Itching    Propoxyphene n-acetaminophen Itching    Tuberculin ppd Hives and Itching         Medication List with Changes/Refills   Current Medications    ALBUTEROL (PROVENTIL/VENTOLIN HFA) 90 MCG/ACTUATION INHALER    Inhale 1-2 puffs into the lungs every 6 (six) hours as needed for Wheezing (Cough).    ALLERGY RELIEF, FEXOFENADINE, 180 MG TABLET    TAKE 1 TABLET BY MOUTH ONCE DAILY    ALPRAZOLAM (XANAX) 0.5 MG TABLET    Take 1 tablet (0.5 mg total) by mouth daily as needed for Anxiety.    BENAZEPRIL-HYDROCHLORTHIAZIDE (LOTENSIN HCT) 20-12.5 MG PER TABLET    TAKE TWO TABLETS BY MOUTH ONCE DAILY    BUPROPION (WELLBUTRIN XL) 300 MG 24 HR TABLET    Take 1 tablet (300 mg total) by mouth every morning.    CLINDAMYCIN (CLEOCIN T) 1 % SWAB    Apply topically 2 (two) times daily. For rash under arms    CYCLOBENZAPRINE (FLEXERIL) 10 MG TABLET    TAKE 1 TABLET BY MOUTH THREE TIMES DAILY AS NEEDED FOR MUSCLE SPASM    ERGOCALCIFEROL (ERGOCALCIFEROL) 50,000 UNIT CAP    onecap twice a week    HYDROCHLOROTHIAZIDE (MICROZIDE) 12.5 MG CAPSULE    Take 1 capsule (12.5 mg total) by mouth daily as needed.    HYDROQUINONE 4 % CREA     Apply to dark spots once daily    MELOXICAM (MOBIC) 15 MG TABLET    TAKE 1 TABLET BY MOUTH ONCE DAILY    METFORMIN (GLUCOPHAGE-XR) 500 MG 24 HR TABLET    Take 1 tablet (500 mg total) by mouth daily with dinner or evening meal.    METOPROLOL SUCCINATE (TOPROL-XL) 50 MG 24 HR TABLET    Take 1 tablet (50 mg total) by mouth once daily.    TACROLIMUS (PROTOPIC) 0.1 % OINTMENT    APPLY AA BID    ZOLPIDEM (AMBIEN) 5 MG TAB    Take 5 mg by mouth every evening.       Patient Active Problem List   Diagnosis    HTN (hypertension)    Hyperlipidemia    Allergic rhinitis    Hypothyroidism    Migraine headache    Iron deficiency anemia    Insulin resistance    Morbid obesity with BMI of 40.0-44.9, adult    Adjustment disorder with mixed anxiety and depressed mood    Insomnia    Vitamin D deficiency disease    Multiple sclerosis         Past medical, surgical, family and social histories have been reviewed today.        Objective:     Vitals:    09/11/19 1030   BP: 118/82   Pulse: 110   Temp: 98.9 °F (37.2 °C)   Weight: 106.9 kg (235 lb 10.8 oz)         Physical Exam   Constitutional: She is oriented to person, place, and time. She appears well-developed and well-nourished. No distress.   Neurological: She is alert and oriented to person, place, and time.   Skin: Lesion (small area/boil to RT lower inner buttock//no fluctuance or induration, mild erythema) noted. She is not diaphoretic.   Psychiatric: She has a normal mood and affect. Her behavior is normal. Judgment and thought content normal.   Vitals reviewed.        Diagnosis       1. Boil of buttock          Assessment/ Plan     Boil of buttock  -     sulfamethoxazole-trimethoprim 800-160mg (BACTRIM DS) 800-160 mg Tab; Take 1 tablet by mouth 2 (two) times daily. for 10 days  Dispense: 20 tablet; Refill: 0  -     mupirocin (BACTROBAN) 2 % ointment; Apply topically 3 (three) times daily. for 10 days  Dispense: 30 g; Refill: 0      Skin/wound care  discussed.  Follow-up in clinic in 2 to 3 days if s/s worsen or persist.      Future Appointments   Date Time Provider Department Center   10/17/2019  8:40 AM [facility] [address]   10/24/2019  3:30 PM Jed Pinedo NP [facility]       Patient Care Team:  Ana Bartlett MD as PCP - General (Internal Medicine)      ELIJAH Serra  [facility]

## 2019-09-19 DIAGNOSIS — B37.31 VULVOVAGINAL CANDIDIASIS: ICD-10-CM

## 2019-09-19 RX ORDER — FLUCONAZOLE 150 MG/1
TABLET ORAL
Qty: 2 TABLET | Refills: 0 | Status: SHIPPED | OUTPATIENT
Start: 2019-09-19 | End: 2019-10-29

## 2019-09-19 NOTE — TELEPHONE ENCOUNTER
Called patient back and she would like a script for diflucan sent over to her pharmacy due to her having some itching down below.

## 2019-09-19 NOTE — TELEPHONE ENCOUNTER
----- Message from Cindy Aldridge sent at 9/19/2019  3:41 PM CDT -----  Contact: Pt   Pt called in regards to speaking with the staff in regards to antibiotics. Pt can be reached at [phone] (sffn)

## 2019-10-28 NOTE — PROGRESS NOTES
Subjective:       Patient ID: Garret Jones is a 43 y.o. female.    Chief Complaint   Patient presents with    Bronchitis       Cough   The current episode started 1 to 4 weeks ago. The problem has been gradually worsening. The cough is productive of sputum. Associated symptoms include chills, postnasal drip, rhinorrhea (clear) and wheezing. Pertinent negatives include no chest pain, ear congestion, ear pain, fever, headaches, hemoptysis, nasal congestion, sore throat, shortness of breath or sweats. She has tried a beta-agonist inhaler (Coricidin-HBP, Allegra) for the symptoms. The treatment provided no relief. Her past medical history is significant for bronchitis and environmental allergies. There is no history of COPD or emphysema.       Review of Systems   Constitutional: Positive for chills. Negative for fever.   HENT: Positive for postnasal drip, rhinorrhea (clear) and sneezing. Negative for congestion, ear pain and sore throat.    Eyes: Negative.    Respiratory: Positive for cough and wheezing. Negative for hemoptysis and shortness of breath.    Cardiovascular: Negative.  Negative for chest pain.   Allergic/Immunologic: Positive for environmental allergies.   Neurological: Negative for light-headedness and headaches.         Review of patient's allergies indicates:   Allergen Reactions    Morphine Itching    Amlodipine      Other reaction(s): Headache  Other reaction(s): Edema    Amoxicillin-pot clavulanate Nausea And Vomiting    Codeine Hives and Itching    Hydrocodone Hives, Itching and Hallucinations    Latex Itching    Propoxyphene n-acetaminophen Itching    Tuberculin ppd Hives and Itching         Medication List with Changes/Refills   Current Medications    ALBUTEROL (PROVENTIL/VENTOLIN HFA) 90 MCG/ACTUATION INHALER    Inhale 1-2 puffs into the lungs every 6 (six) hours as needed for Wheezing (Cough).    ALPRAZOLAM (XANAX) 0.5 MG TABLET    Take 1 tablet (0.5 mg total) by mouth daily as  needed for Anxiety.    BENAZEPRIL-HYDROCHLORTHIAZIDE (LOTENSIN HCT) 20-12.5 MG PER TABLET    TAKE TWO TABLETS BY MOUTH ONCE DAILY    BUPROPION (WELLBUTRIN XL) 300 MG 24 HR TABLET    Take 300 mg by mouth every morning.    CLINDAMYCIN (CLEOCIN T) 1 % SWAB    Apply topically 2 (two) times daily. For rash under arms    CYCLOBENZAPRINE (FLEXERIL) 10 MG TABLET    Take 10 mg by mouth daily as needed.    ERGOCALCIFEROL (ERGOCALCIFEROL) 50,000 UNIT CAP    onecap twice a week    FEXOFENADINE (ALLEGRA) 180 MG TABLET    Take 180 mg by mouth.    HYDROCHLOROTHIAZIDE (MICROZIDE) 12.5 MG CAPSULE    Take 12.5 mg by mouth.    HYDROQUINONE 4 % CREA    Apply to dark spots once daily    MELOXICAM (MOBIC) 15 MG TABLET    Take 15 mg by mouth daily as needed.    METFORMIN (GLUCOPHAGE-XR) 500 MG 24 HR TABLET    TAKE 1 TABLET BY MOUTH ONCE DAILY WITH DINNER OR EVENING MEAL    METOPROLOL SUCCINATE (TOPROL-XL) 50 MG 24 HR TABLET    Take 1 tablet (50 mg total) by mouth once daily.    SYNTHROID 100 MCG TABLET    Take 100 mcg by mouth once daily.    TACROLIMUS (PROTOPIC) 0.1 % OINTMENT    APPLY AA BID    ZOLPIDEM (AMBIEN) 10 MG TAB    Take 5 mg by mouth.   Discontinued Medications    ALLERGY RELIEF, FEXOFENADINE, 180 MG TABLET    TAKE 1 TABLET BY MOUTH ONCE DAILY    BUPROPION (WELLBUTRIN XL) 300 MG 24 HR TABLET    Take 1 tablet (300 mg total) by mouth every morning.    CEFDINIR (OMNICEF) 300 MG CAPSULE    TAKE 1 CAPSULE BY MOUTH EVERY 12 HOURS FOR 10 DAYS    CYCLOBENZAPRINE (FLEXERIL) 10 MG TABLET    TAKE 1 TABLET BY MOUTH THREE TIMES DAILY AS NEEDED FOR MUSCLE SPASM    ERGOCALCIFEROL (ERGOCALCIFEROL) 50,000 UNIT CAP    onecap twice a week    FLUCONAZOLE (DIFLUCAN) 150 MG TAB    TAKE 1 TABLET BY MOUTH NOW THEN REPEAT DOSE IN 3 DAYS    HYDROCHLOROTHIAZIDE (MICROZIDE) 12.5 MG CAPSULE    Take 1 capsule (12.5 mg total) by mouth daily as needed.    MELOXICAM (MOBIC) 15 MG TABLET    TAKE 1 TABLET BY MOUTH ONCE DAILY    METFORMIN (GLUCOPHAGE-XR) 500 MG  "24 HR TABLET    Take 1 tablet (500 mg total) by mouth daily with dinner or evening meal.    TACROLIMUS (PROTOPIC) 0.1 % OINTMENT    APPLY AA BID    ZOLPIDEM (AMBIEN) 5 MG TAB    Take 5 mg by mouth every evening.       Patient Active Problem List   Diagnosis    HTN (hypertension)    Hyperlipidemia    Allergic rhinitis    Hypothyroidism    Migraine headache    Iron deficiency anemia    Insulin resistance    Morbid obesity with BMI of 40.0-44.9, adult    Adjustment disorder with mixed anxiety and depressed mood    Insomnia    Vitamin D deficiency disease    Multiple sclerosis         Past medical, surgical, family and social histories have been reviewed today.        Objective:     Vitals:    10/29/19 1109   BP: (!) 135/90   Pulse: 82   Temp: 99.8 °F (37.7 °C)   Weight: 110.9 kg (244 lb 7.8 oz)   Height: 5' 1" (1.549 m)   PainSc: 0-No pain         Physical Exam   Constitutional: She is oriented to person, place, and time. She appears well-developed and well-nourished. She appears distressed (constant cough//wet and productive).   HENT:   Head: Normocephalic and atraumatic.   Right Ear: Tympanic membrane normal.   Left Ear: Tympanic membrane normal.   Nose: Mucosal edema and rhinorrhea (boggy//clear RN) present. No epistaxis. Right sinus exhibits no maxillary sinus tenderness and no frontal sinus tenderness. Left sinus exhibits no maxillary sinus tenderness and no frontal sinus tenderness.   Mouth/Throat: Oropharynx is clear and moist and mucous membranes are normal.   Eyes: Pupils are equal, round, and reactive to light. Conjunctivae are normal. Right eye exhibits no discharge. Left eye exhibits no discharge.   Cardiovascular: Normal rate and regular rhythm.   Pulmonary/Chest: Effort normal. No accessory muscle usage or stridor. No tachypnea. No respiratory distress. She has wheezes in the right lower field.   Musculoskeletal: Normal range of motion. She exhibits no edema.   Lymphadenopathy:     She has no " cervical adenopathy.   Neurological: She is alert and oriented to person, place, and time.   Skin: Skin is warm and dry. Capillary refill takes less than 2 seconds. No rash noted.   Psychiatric: She has a normal mood and affect. Her behavior is normal. Judgment and thought content normal.   Vitals reviewed.        Diagnosis       1. Acute bronchitis, unspecified organism    2. Allergic rhinitis, unspecified seasonality, unspecified trigger          Assessment/ Plan     Acute bronchitis, unspecified organism  -     betamethasone acetate-betamethasone sodium phosphate injection 12 mg  -     predniSONE (DELTASONE) 20 MG tablet; Take 2 tab daily x 3 days, 1 tab daily x 3 days, then 1/2 tab daily x 2 days.  Dispense: 10 tablet; Refill: 0  -     azithromycin (Z-LARRY) 250 MG tablet; Take 2 tablets by mouth on day 1; Take 1 tablet by mouth on days 2-5  Dispense: 6 tablet; Refill: 0  -     albuterol (PROVENTIL/VENTOLIN HFA) 90 mcg/actuation inhaler; Inhale 1-2 puffs into the lungs every 6 (six) hours as needed for Wheezing (Cough).  Dispense: 1 Inhaler; Refill: 1    Allergic rhinitis, unspecified seasonality, unspecified trigger  -     betamethasone acetate-betamethasone sodium phosphate injection 12 mg  -     predniSONE (DELTASONE) 20 MG tablet; Take 2 tab daily x 3 days, 1 tab daily x 3 days, then 1/2 tab daily x 2 days.  Dispense: 10 tablet; Refill: 0        Injection today.  Continue Allegra and Coricidin cough med OTC as needed.  Symptomatic care, rest and fluids.  Follow-up in clinic as needed.        Patient Care Team:  Ana Bartlett MD as PCP - General (Internal Medicine)      ELIJAH Serra  [facility]

## 2019-10-29 ENCOUNTER — OFFICE VISIT (OUTPATIENT)
Dept: FAMILY MEDICINE | Facility: CLINIC | Age: 44
End: 2019-10-29
Payer: COMMERCIAL

## 2019-10-29 VITALS
TEMPERATURE: 100 F | DIASTOLIC BLOOD PRESSURE: 90 MMHG | BODY MASS INDEX: 46.16 KG/M2 | HEIGHT: 61 IN | SYSTOLIC BLOOD PRESSURE: 135 MMHG | HEART RATE: 82 BPM | WEIGHT: 244.5 LBS

## 2019-10-29 DIAGNOSIS — J20.9 ACUTE BRONCHITIS, UNSPECIFIED ORGANISM: Primary | ICD-10-CM

## 2019-10-29 DIAGNOSIS — J30.9 ALLERGIC RHINITIS, UNSPECIFIED SEASONALITY, UNSPECIFIED TRIGGER: ICD-10-CM

## 2019-10-29 PROCEDURE — 3008F PR BODY MASS INDEX (BMI) DOCUMENTED: ICD-10-PCS | Mod: CPTII,S$GLB,, | Performed by: REGISTERED NURSE

## 2019-10-29 PROCEDURE — 99999 PR PBB SHADOW E&M-EST. PATIENT-LVL IV: CPT | Mod: PBBFAC,,, | Performed by: REGISTERED NURSE

## 2019-10-29 PROCEDURE — 99214 PR OFFICE/OUTPT VISIT, EST, LEVL IV, 30-39 MIN: ICD-10-PCS | Mod: 25,S$GLB,, | Performed by: REGISTERED NURSE

## 2019-10-29 PROCEDURE — 3075F SYST BP GE 130 - 139MM HG: CPT | Mod: CPTII,S$GLB,, | Performed by: REGISTERED NURSE

## 2019-10-29 PROCEDURE — 99214 OFFICE O/P EST MOD 30 MIN: CPT | Mod: 25,S$GLB,, | Performed by: REGISTERED NURSE

## 2019-10-29 PROCEDURE — 96372 PR INJECTION,THERAP/PROPH/DIAG2ST, IM OR SUBCUT: ICD-10-PCS | Mod: S$GLB,,, | Performed by: REGISTERED NURSE

## 2019-10-29 PROCEDURE — 3080F PR MOST RECENT DIASTOLIC BLOOD PRESSURE >= 90 MM HG: ICD-10-PCS | Mod: CPTII,S$GLB,, | Performed by: REGISTERED NURSE

## 2019-10-29 PROCEDURE — 3080F DIAST BP >= 90 MM HG: CPT | Mod: CPTII,S$GLB,, | Performed by: REGISTERED NURSE

## 2019-10-29 PROCEDURE — 3075F PR MOST RECENT SYSTOLIC BLOOD PRESS GE 130-139MM HG: ICD-10-PCS | Mod: CPTII,S$GLB,, | Performed by: REGISTERED NURSE

## 2019-10-29 PROCEDURE — 96372 THER/PROPH/DIAG INJ SC/IM: CPT | Mod: S$GLB,,, | Performed by: REGISTERED NURSE

## 2019-10-29 PROCEDURE — 99999 PR PBB SHADOW E&M-EST. PATIENT-LVL IV: ICD-10-PCS | Mod: PBBFAC,,, | Performed by: REGISTERED NURSE

## 2019-10-29 PROCEDURE — 3008F BODY MASS INDEX DOCD: CPT | Mod: CPTII,S$GLB,, | Performed by: REGISTERED NURSE

## 2019-10-29 RX ORDER — TACROLIMUS 1 MG/G
OINTMENT TOPICAL
COMMUNITY
Start: 2019-05-01 | End: 2020-01-30

## 2019-10-29 RX ORDER — CYCLOBENZAPRINE HCL 10 MG
10 TABLET ORAL DAILY PRN
COMMUNITY
Start: 2018-04-11 | End: 2020-05-04 | Stop reason: SDUPTHER

## 2019-10-29 RX ORDER — HYDROCHLOROTHIAZIDE 12.5 MG/1
12.5 CAPSULE ORAL
COMMUNITY
Start: 2019-07-24 | End: 2020-05-04 | Stop reason: SDUPTHER

## 2019-10-29 RX ORDER — ZOLPIDEM TARTRATE 10 MG/1
5 TABLET ORAL
COMMUNITY
Start: 2018-03-12 | End: 2019-12-03 | Stop reason: SDUPTHER

## 2019-10-29 RX ORDER — ERGOCALCIFEROL 1.25 MG/1
CAPSULE ORAL
COMMUNITY
Start: 2019-06-04 | End: 2019-12-11 | Stop reason: SDUPTHER

## 2019-10-29 RX ORDER — LEVOTHYROXINE SODIUM 100 UG/1
100 TABLET ORAL DAILY
Refills: 11 | COMMUNITY
Start: 2019-09-17 | End: 2023-03-15

## 2019-10-29 RX ORDER — BUPROPION HYDROCHLORIDE 300 MG/1
300 TABLET ORAL EVERY MORNING
Refills: 5 | COMMUNITY
Start: 2019-08-09 | End: 2020-10-07 | Stop reason: SDUPTHER

## 2019-10-29 RX ORDER — METFORMIN HYDROCHLORIDE 500 MG/1
TABLET, EXTENDED RELEASE ORAL
COMMUNITY
Start: 2019-07-24 | End: 2020-10-07

## 2019-10-29 RX ORDER — BETAMETHASONE SODIUM PHOSPHATE AND BETAMETHASONE ACETATE 3; 3 MG/ML; MG/ML
12 INJECTION, SUSPENSION INTRA-ARTICULAR; INTRALESIONAL; INTRAMUSCULAR; SOFT TISSUE
Status: COMPLETED | OUTPATIENT
Start: 2019-10-29 | End: 2019-10-29

## 2019-10-29 RX ORDER — AZITHROMYCIN 250 MG/1
TABLET, FILM COATED ORAL
Qty: 6 TABLET | Refills: 0 | Status: SHIPPED | OUTPATIENT
Start: 2019-10-29 | End: 2019-11-03

## 2019-10-29 RX ORDER — ALBUTEROL SULFATE 90 UG/1
1-2 AEROSOL, METERED RESPIRATORY (INHALATION) EVERY 6 HOURS PRN
Qty: 1 INHALER | Refills: 1 | Status: SHIPPED | OUTPATIENT
Start: 2019-10-29 | End: 2020-10-07

## 2019-10-29 RX ORDER — MELOXICAM 15 MG/1
15 TABLET ORAL DAILY PRN
COMMUNITY
Start: 2019-06-02 | End: 2020-05-04 | Stop reason: SDUPTHER

## 2019-10-29 RX ORDER — CEFDINIR 300 MG/1
CAPSULE ORAL
Refills: 0 | COMMUNITY
Start: 2019-08-17 | End: 2019-10-29

## 2019-10-29 RX ORDER — PREDNISONE 20 MG/1
TABLET ORAL
Qty: 10 TABLET | Refills: 0 | Status: SHIPPED | OUTPATIENT
Start: 2019-10-29 | End: 2019-12-09

## 2019-10-29 RX ORDER — MINERAL OIL
180 ENEMA (ML) RECTAL
COMMUNITY
Start: 2018-03-27 | End: 2020-05-04 | Stop reason: SDUPTHER

## 2019-10-29 RX ADMIN — BETAMETHASONE SODIUM PHOSPHATE AND BETAMETHASONE ACETATE 12 MG: 3; 3 INJECTION, SUSPENSION INTRA-ARTICULAR; INTRALESIONAL; INTRAMUSCULAR; SOFT TISSUE at 11:10

## 2019-10-30 ENCOUNTER — TELEPHONE (OUTPATIENT)
Dept: FAMILY MEDICINE | Facility: CLINIC | Age: 44
End: 2019-10-30

## 2019-10-30 NOTE — TELEPHONE ENCOUNTER
Spoke with pt informed per provider's notes that z-kaycee does not treat allergies and to continue prednisone as ordered. Advised to take medication earlier in the day to allow the body to wind down before bedtime. Voiced understanding. Call concluded.

## 2019-10-30 NOTE — TELEPHONE ENCOUNTER
Returned call to pt states that she did not sleep at all last night and thinks it's due to the steroid. Education provided on steroid causing to not be able to sleep. Requesting z-kaycee to be sent over.States has taken z-kaycee in the past and did good on it. Reports feeling better. Pharmacy verified.     Please advise.

## 2019-10-30 NOTE — TELEPHONE ENCOUNTER
----- Message from Alicia Freedman sent at 10/30/2019  3:01 PM CDT -----  Contact: PATIENT  Type:  Needs Medical Advice    Who Called: PATIENT  Symptoms (please be specific): NOT ABLE TO SLEEP LAST NIGHT   How long has patient had these symptoms:  MAYBE FROM MEDICATION PREDNISONE  Pharmacy name and phone #:    [id number] - [place] - [address]  [address]  [place] [id number]  Phone: [phone] Fax: [phone]    Would the patient rather a call back or a response via [facility]? CALL  Best Call Back Number: [phone]  Additional Information: PLEASE CALL ASAP TODAY

## 2019-10-30 NOTE — TELEPHONE ENCOUNTER
Phone call to patient.  She was notified of the message and she verbalized understanding.   She has allergies --- Z-Pack does not treat this

## 2019-12-03 RX ORDER — ZOLPIDEM TARTRATE 5 MG/1
5 TABLET ORAL NIGHTLY
Refills: 5 | COMMUNITY
Start: 2019-10-29 | End: 2020-10-07 | Stop reason: SDUPTHER

## 2019-12-03 RX ORDER — ALBUTEROL SULFATE 90 UG/1
AEROSOL, METERED RESPIRATORY (INHALATION)
Qty: 18 EACH | Refills: 0 | Status: SHIPPED | OUTPATIENT
Start: 2019-12-03 | End: 2020-10-05

## 2019-12-03 NOTE — TELEPHONE ENCOUNTER
----- Message from Kenyetta Redmond sent at 12/3/2019 10:39 AM CST -----  Contact: pt  Pt would like a call back due to pharmacy sending over a request. Pt can be reached at [phone]        Thanks,  Kenyetta Redmond

## 2019-12-09 ENCOUNTER — OFFICE VISIT (OUTPATIENT)
Dept: FAMILY MEDICINE | Facility: CLINIC | Age: 44
End: 2019-12-09
Payer: COMMERCIAL

## 2019-12-09 ENCOUNTER — LAB VISIT (OUTPATIENT)
Dept: LAB | Facility: HOSPITAL | Age: 44
End: 2019-12-09
Attending: REGISTERED NURSE
Payer: COMMERCIAL

## 2019-12-09 VITALS
HEART RATE: 84 BPM | WEIGHT: 240.31 LBS | TEMPERATURE: 98 F | BODY MASS INDEX: 45.37 KG/M2 | DIASTOLIC BLOOD PRESSURE: 81 MMHG | SYSTOLIC BLOOD PRESSURE: 121 MMHG | HEIGHT: 61 IN

## 2019-12-09 DIAGNOSIS — I10 ESSENTIAL HYPERTENSION: ICD-10-CM

## 2019-12-09 DIAGNOSIS — E55.9 VITAMIN D DEFICIENCY DISEASE: ICD-10-CM

## 2019-12-09 DIAGNOSIS — Z00.00 PREVENTATIVE HEALTH CARE: Primary | ICD-10-CM

## 2019-12-09 DIAGNOSIS — Z00.00 PREVENTATIVE HEALTH CARE: ICD-10-CM

## 2019-12-09 DIAGNOSIS — E88.819 INSULIN RESISTANCE: ICD-10-CM

## 2019-12-09 DIAGNOSIS — Z53.21 PATIENT LEFT AFTER TRIAGE: Primary | ICD-10-CM

## 2019-12-09 PROBLEM — E66.01 MORBID OBESITY WITH BMI OF 45.0-49.9, ADULT: Status: ACTIVE | Noted: 2019-12-09

## 2019-12-09 LAB
BASOPHILS # BLD AUTO: 0.06 K/UL (ref 0–0.2)
BASOPHILS NFR BLD: 1.1 % (ref 0–1.9)
DIFFERENTIAL METHOD: ABNORMAL
EOSINOPHIL # BLD AUTO: 0.3 K/UL (ref 0–0.5)
EOSINOPHIL NFR BLD: 5.3 % (ref 0–8)
ERYTHROCYTE [DISTWIDTH] IN BLOOD BY AUTOMATED COUNT: 13.2 % (ref 11.5–14.5)
HCT VFR BLD AUTO: 40.4 % (ref 37–48.5)
HGB BLD-MCNC: 12.4 G/DL (ref 12–16)
IMM GRANULOCYTES # BLD AUTO: 0.01 K/UL (ref 0–0.04)
IMM GRANULOCYTES NFR BLD AUTO: 0.2 % (ref 0–0.5)
LYMPHOCYTES # BLD AUTO: 1.9 K/UL (ref 1–4.8)
LYMPHOCYTES NFR BLD: 36.8 % (ref 18–48)
MCH RBC QN AUTO: 28.3 PG (ref 27–31)
MCHC RBC AUTO-ENTMCNC: 30.7 G/DL (ref 32–36)
MCV RBC AUTO: 92 FL (ref 82–98)
MONOCYTES # BLD AUTO: 0.6 K/UL (ref 0.3–1)
MONOCYTES NFR BLD: 10.5 % (ref 4–15)
NEUTROPHILS # BLD AUTO: 2.4 K/UL (ref 1.8–7.7)
NEUTROPHILS NFR BLD: 46.1 % (ref 38–73)
NRBC BLD-RTO: 0 /100 WBC
PLATELET # BLD AUTO: 388 K/UL (ref 150–350)
PMV BLD AUTO: 9.7 FL (ref 9.2–12.9)
RBC # BLD AUTO: 4.38 M/UL (ref 4–5.4)
WBC # BLD AUTO: 5.24 K/UL (ref 3.9–12.7)

## 2019-12-09 PROCEDURE — 99499 UNLISTED E&M SERVICE: CPT | Mod: S$GLB,,, | Performed by: REGISTERED NURSE

## 2019-12-09 PROCEDURE — 84439 ASSAY OF FREE THYROXINE: CPT

## 2019-12-09 PROCEDURE — 84443 ASSAY THYROID STIM HORMONE: CPT

## 2019-12-09 PROCEDURE — 83036 HEMOGLOBIN GLYCOSYLATED A1C: CPT

## 2019-12-09 PROCEDURE — 83525 ASSAY OF INSULIN: CPT

## 2019-12-09 PROCEDURE — 99999 PR PBB SHADOW E&M-EST. PATIENT-LVL IV: ICD-10-PCS | Mod: PBBFAC,,, | Performed by: REGISTERED NURSE

## 2019-12-09 PROCEDURE — 84403 ASSAY OF TOTAL TESTOSTERONE: CPT

## 2019-12-09 PROCEDURE — 82306 VITAMIN D 25 HYDROXY: CPT

## 2019-12-09 PROCEDURE — 36415 COLL VENOUS BLD VENIPUNCTURE: CPT | Mod: PO

## 2019-12-09 PROCEDURE — 99999 PR PBB SHADOW E&M-EST. PATIENT-LVL IV: CPT | Mod: PBBFAC,,, | Performed by: REGISTERED NURSE

## 2019-12-09 PROCEDURE — 80053 COMPREHEN METABOLIC PANEL: CPT

## 2019-12-09 PROCEDURE — 99499 NO LOS: ICD-10-PCS | Mod: S$GLB,,, | Performed by: REGISTERED NURSE

## 2019-12-09 PROCEDURE — 85025 COMPLETE CBC W/AUTO DIFF WBC: CPT

## 2019-12-09 PROCEDURE — 80061 LIPID PANEL: CPT

## 2019-12-09 NOTE — PROGRESS NOTES
Subjective:       Patient ID: Garret Jones is a 43 y.o. female.    Chief Complaint   Patient presents with    Follow-up       HPI    Garret Jones is here today with c/o      Review of Systems      Review of patient's allergies indicates:   Allergen Reactions    Morphine Itching    Amlodipine      Other reaction(s): Headache  Other reaction(s): Edema    Amoxicillin-pot clavulanate Nausea And Vomiting    Codeine Hives and Itching    Hydrocodone Hives, Itching and Hallucinations    Latex Itching    Propoxyphene n-acetaminophen Itching    Tuberculin ppd Hives and Itching         Medication List with Changes/Refills   Current Medications    ALBUTEROL (PROVENTIL/VENTOLIN HFA) 90 MCG/ACTUATION INHALER    Inhale 1-2 puffs into the lungs every 6 (six) hours as needed for Wheezing (Cough).    ALPRAZOLAM (XANAX) 0.5 MG TABLET    Take 1 tablet (0.5 mg total) by mouth daily as needed for Anxiety.    BENAZEPRIL-HYDROCHLORTHIAZIDE (LOTENSIN HCT) 20-12.5 MG PER TABLET    TAKE TWO TABLETS BY MOUTH ONCE DAILY    BUPROPION (WELLBUTRIN XL) 300 MG 24 HR TABLET    Take 300 mg by mouth every morning.    CLINDAMYCIN (CLEOCIN T) 1 % SWAB    Apply topically 2 (two) times daily. For rash under arms    CYCLOBENZAPRINE (FLEXERIL) 10 MG TABLET    Take 10 mg by mouth daily as needed.    ERGOCALCIFEROL (ERGOCALCIFEROL) 50,000 UNIT CAP    onecap twice a week    FEXOFENADINE (ALLEGRA) 180 MG TABLET    Take 180 mg by mouth.    HYDROCHLOROTHIAZIDE (MICROZIDE) 12.5 MG CAPSULE    Take 12.5 mg by mouth.    HYDROQUINONE 4 % CREA    Apply to dark spots once daily    MELOXICAM (MOBIC) 15 MG TABLET    Take 15 mg by mouth daily as needed.    METFORMIN (GLUCOPHAGE-XR) 500 MG 24 HR TABLET    TAKE 1 TABLET BY MOUTH ONCE DAILY WITH DINNER OR EVENING MEAL    METOPROLOL SUCCINATE (TOPROL-XL) 50 MG 24 HR TABLET    Take 1 tablet (50 mg total) by mouth once daily.    PROAIR HFA 90 MCG/ACTUATION INHALER    INHALE 2 PUFFS BY MOUTH EVERY 6  "HOURS AS NEEDED FOR WHEEZING    SYNTHROID 100 MCG TABLET    Take 100 mcg by mouth once daily.    TACROLIMUS (PROTOPIC) 0.1 % OINTMENT    APPLY AA BID    ZOLPIDEM (AMBIEN) 5 MG TAB    Take 5 mg by mouth every evening.   Discontinued Medications    PREDNISONE (DELTASONE) 20 MG TABLET    Take 2 tab daily x 3 days, 1 tab daily x 3 days, then 1/2 tab daily x 2 days.       Patient Active Problem List   Diagnosis    HTN (hypertension)    Hyperlipidemia    Allergic rhinitis    Hypothyroidism    Migraine headache    Iron deficiency anemia    Insulin resistance    Morbid obesity with BMI of 40.0-44.9, adult    Adjustment disorder with mixed anxiety and depressed mood    Insomnia    Vitamin D deficiency disease    Multiple sclerosis         Past medical, surgical, family and social histories have been reviewed today.        Objective:     Vitals:    12/09/19 1303   BP: 121/81   Pulse: 84   Temp: 97.9 °F (36.6 °C)   Weight: 109 kg (240 lb 4.8 oz)   Height: 5' 1" (1.549 m)   PainSc: 0-No pain         Physical Exam      Diagnosis       No diagnosis found.      Assessment/ Plan     There are no diagnoses linked to this encounter.          Patient Care Team:  Ana Bartlett MD as PCP - General (Internal Medicine)      ELIJAH Serra  [facility]   "

## 2019-12-10 LAB
25(OH)D3+25(OH)D2 SERPL-MCNC: 17 NG/ML (ref 30–96)
25(OH)D3+25(OH)D2 SERPL-MCNC: 17 NG/ML (ref 30–96)
ALBUMIN SERPL BCP-MCNC: 3.9 G/DL (ref 3.5–5.2)
ALBUMIN SERPL BCP-MCNC: 3.9 G/DL (ref 3.5–5.2)
ALP SERPL-CCNC: 56 U/L (ref 55–135)
ALP SERPL-CCNC: 56 U/L (ref 55–135)
ALT SERPL W/O P-5'-P-CCNC: 14 U/L (ref 10–44)
ALT SERPL W/O P-5'-P-CCNC: 14 U/L (ref 10–44)
ANION GAP SERPL CALC-SCNC: 9 MMOL/L (ref 8–16)
ANION GAP SERPL CALC-SCNC: 9 MMOL/L (ref 8–16)
AST SERPL-CCNC: 20 U/L (ref 10–40)
AST SERPL-CCNC: 20 U/L (ref 10–40)
BILIRUB SERPL-MCNC: 0.6 MG/DL (ref 0.1–1)
BILIRUB SERPL-MCNC: 0.6 MG/DL (ref 0.1–1)
BUN SERPL-MCNC: 10 MG/DL (ref 6–20)
BUN SERPL-MCNC: 10 MG/DL (ref 6–20)
CALCIUM SERPL-MCNC: 9.8 MG/DL (ref 8.7–10.5)
CALCIUM SERPL-MCNC: 9.8 MG/DL (ref 8.7–10.5)
CHLORIDE SERPL-SCNC: 99 MMOL/L (ref 95–110)
CHLORIDE SERPL-SCNC: 99 MMOL/L (ref 95–110)
CHOLEST SERPL-MCNC: 183 MG/DL (ref 120–199)
CHOLEST/HDLC SERPL: 3.7 {RATIO} (ref 2–5)
CO2 SERPL-SCNC: 28 MMOL/L (ref 23–29)
CO2 SERPL-SCNC: 28 MMOL/L (ref 23–29)
CREAT SERPL-MCNC: 0.8 MG/DL (ref 0.5–1.4)
CREAT SERPL-MCNC: 0.8 MG/DL (ref 0.5–1.4)
EST. GFR  (AFRICAN AMERICAN): >60 ML/MIN/1.73 M^2
EST. GFR  (AFRICAN AMERICAN): >60 ML/MIN/1.73 M^2
EST. GFR  (NON AFRICAN AMERICAN): >60 ML/MIN/1.73 M^2
EST. GFR  (NON AFRICAN AMERICAN): >60 ML/MIN/1.73 M^2
ESTIMATED AVG GLUCOSE: 105 MG/DL (ref 68–131)
ESTIMATED AVG GLUCOSE: 105 MG/DL (ref 68–131)
GLUCOSE SERPL-MCNC: 73 MG/DL (ref 70–110)
GLUCOSE SERPL-MCNC: 73 MG/DL (ref 70–110)
HBA1C MFR BLD HPLC: 5.3 % (ref 4–5.6)
HBA1C MFR BLD HPLC: 5.3 % (ref 4–5.6)
HDLC SERPL-MCNC: 50 MG/DL (ref 40–75)
HDLC SERPL: 27.3 % (ref 20–50)
INSULIN COLLECTION INTERVAL: NORMAL
INSULIN SERPL-ACNC: 8.3 UU/ML
LDLC SERPL CALC-MCNC: 118 MG/DL (ref 63–159)
NONHDLC SERPL-MCNC: 133 MG/DL
POTASSIUM SERPL-SCNC: 3.6 MMOL/L (ref 3.5–5.1)
POTASSIUM SERPL-SCNC: 3.6 MMOL/L (ref 3.5–5.1)
PROT SERPL-MCNC: 7.6 G/DL (ref 6–8.4)
PROT SERPL-MCNC: 7.6 G/DL (ref 6–8.4)
SODIUM SERPL-SCNC: 136 MMOL/L (ref 136–145)
SODIUM SERPL-SCNC: 136 MMOL/L (ref 136–145)
T4 FREE SERPL-MCNC: 0.99 NG/DL (ref 0.71–1.51)
TESTOST SERPL-MCNC: 20 NG/DL (ref 5–73)
TRIGL SERPL-MCNC: 75 MG/DL (ref 30–150)
TSH SERPL DL<=0.005 MIU/L-ACNC: 7.09 UIU/ML (ref 0.4–4)

## 2019-12-11 RX ORDER — METOPROLOL SUCCINATE 50 MG/1
50 TABLET, EXTENDED RELEASE ORAL DAILY
Qty: 90 TABLET | Refills: 1 | Status: SHIPPED | OUTPATIENT
Start: 2019-12-11 | End: 2020-05-04 | Stop reason: SDUPTHER

## 2019-12-11 RX ORDER — BENAZEPRIL HYDROCHLORIDE AND HYDROCHLOROTHIAZIDE 20; 12.5 MG/1; MG/1
TABLET ORAL
Qty: 180 TABLET | Refills: 1 | Status: SHIPPED | OUTPATIENT
Start: 2019-12-11 | End: 2020-05-04 | Stop reason: SDUPTHER

## 2019-12-11 RX ORDER — ERGOCALCIFEROL 1.25 MG/1
50000 CAPSULE ORAL WEEKLY
Qty: 12 CAPSULE | Refills: 1 | Status: SHIPPED | OUTPATIENT
Start: 2019-12-11 | End: 2020-10-07

## 2019-12-11 NOTE — TELEPHONE ENCOUNTER
----- Message from Alana Oliver sent at 12/11/2019  9:36 AM CST -----  Contact: Pt  Type:  Patient Returning Call    Who Called:PT  Who Left Message for Patient:NURSE/MA  Does the patient know what this is regarding?:NO  Would the patient rather a call back or a response via [facility]? CALL BACK  Best Call Back Number:[phone]  Additional Information:

## 2019-12-11 NOTE — TELEPHONE ENCOUNTER
Returned call to pt informed of lab results as mentioned. Reports that Dr. Lange prescribed synthroid and she had not began taking it but will begin taking medications as ordered. Denies taking a Vit D supplement but needs refill sent to verified pharmacy with other pended medications.     Please advise.

## 2020-01-30 NOTE — TELEPHONE ENCOUNTER
----- Message from Rosalva Cantor sent at 1/30/2020 12:20 PM CST -----  Contact: Patient  Patient states her skin is drying out again and would like something call in to her pharmacy for her skin. Please call to advise at ph .[phone] (home)

## 2020-01-30 NOTE — TELEPHONE ENCOUNTER
Pt needed a refill request, request sent to Dr. Lion, pt informed that she is out of the office until next week.

## 2020-01-31 RX ORDER — TACROLIMUS 1 MG/G
OINTMENT TOPICAL 2 TIMES DAILY
Qty: 30 G | Refills: 1 | Status: SHIPPED | OUTPATIENT
Start: 2020-01-31 | End: 2020-02-24 | Stop reason: SDUPTHER

## 2020-02-11 ENCOUNTER — OFFICE VISIT (OUTPATIENT)
Dept: FAMILY MEDICINE | Facility: CLINIC | Age: 45
End: 2020-02-11
Payer: COMMERCIAL

## 2020-02-11 VITALS
BODY MASS INDEX: 46.57 KG/M2 | SYSTOLIC BLOOD PRESSURE: 124 MMHG | HEART RATE: 80 BPM | TEMPERATURE: 98 F | WEIGHT: 246.5 LBS | DIASTOLIC BLOOD PRESSURE: 86 MMHG

## 2020-02-11 DIAGNOSIS — J45.901 ASTHMA WITH ACUTE EXACERBATION, UNSPECIFIED ASTHMA SEVERITY, UNSPECIFIED WHETHER PERSISTENT: Primary | ICD-10-CM

## 2020-02-11 DIAGNOSIS — J30.9 ALLERGIC RHINITIS, UNSPECIFIED SEASONALITY, UNSPECIFIED TRIGGER: ICD-10-CM

## 2020-02-11 PROCEDURE — 3008F BODY MASS INDEX DOCD: CPT | Mod: CPTII,S$GLB,, | Performed by: REGISTERED NURSE

## 2020-02-11 PROCEDURE — 99999 PR PBB SHADOW E&M-EST. PATIENT-LVL IV: ICD-10-PCS | Mod: PBBFAC,,, | Performed by: REGISTERED NURSE

## 2020-02-11 PROCEDURE — 99999 PR PBB SHADOW E&M-EST. PATIENT-LVL IV: CPT | Mod: PBBFAC,,, | Performed by: REGISTERED NURSE

## 2020-02-11 PROCEDURE — 3008F PR BODY MASS INDEX (BMI) DOCUMENTED: ICD-10-PCS | Mod: CPTII,S$GLB,, | Performed by: REGISTERED NURSE

## 2020-02-11 PROCEDURE — 3079F PR MOST RECENT DIASTOLIC BLOOD PRESSURE 80-89 MM HG: ICD-10-PCS | Mod: CPTII,S$GLB,, | Performed by: REGISTERED NURSE

## 2020-02-11 PROCEDURE — 99214 PR OFFICE/OUTPT VISIT, EST, LEVL IV, 30-39 MIN: ICD-10-PCS | Mod: S$GLB,,, | Performed by: REGISTERED NURSE

## 2020-02-11 PROCEDURE — 3074F PR MOST RECENT SYSTOLIC BLOOD PRESSURE < 130 MM HG: ICD-10-PCS | Mod: CPTII,S$GLB,, | Performed by: REGISTERED NURSE

## 2020-02-11 PROCEDURE — 3074F SYST BP LT 130 MM HG: CPT | Mod: CPTII,S$GLB,, | Performed by: REGISTERED NURSE

## 2020-02-11 PROCEDURE — 99214 OFFICE O/P EST MOD 30 MIN: CPT | Mod: S$GLB,,, | Performed by: REGISTERED NURSE

## 2020-02-11 PROCEDURE — 3079F DIAST BP 80-89 MM HG: CPT | Mod: CPTII,S$GLB,, | Performed by: REGISTERED NURSE

## 2020-02-11 RX ORDER — MONTELUKAST SODIUM 10 MG/1
10 TABLET ORAL NIGHTLY
Qty: 30 TABLET | Refills: 2 | Status: SHIPPED | OUTPATIENT
Start: 2020-02-11 | End: 2020-05-04 | Stop reason: SDUPTHER

## 2020-02-11 RX ORDER — BUDESONIDE AND FORMOTEROL FUMARATE DIHYDRATE 80; 4.5 UG/1; UG/1
2 AEROSOL RESPIRATORY (INHALATION) DAILY
Qty: 10.2 G | Refills: 2 | Status: SHIPPED | OUTPATIENT
Start: 2020-02-11 | End: 2020-10-07

## 2020-02-11 NOTE — PROGRESS NOTES
Subjective:       Patient ID: Garret Jones is a 44 y.o. female.    Chief Complaint   Patient presents with    Breathing problem       HPI    Garret Jones is here today with c/o respiratory issues over past few days.  Using her rescue inhaler every 6 hrs.  Started nebulizer today.  Reports SOB and chest tightness.  No f/c or cough.      Review of Systems   Constitutional: Negative.    HENT: Positive for congestion, postnasal drip and sneezing. Negative for rhinorrhea and sinus pain.    Eyes: Negative.    Respiratory: Positive for chest tightness and shortness of breath.    Cardiovascular: Negative.    Allergic/Immunologic: Positive for environmental allergies.   Neurological: Negative.          Review of patient's allergies indicates:   Allergen Reactions    Morphine Itching    Amlodipine      Other reaction(s): Headache  Other reaction(s): Edema    Amoxicillin-pot clavulanate Nausea And Vomiting    Codeine Hives and Itching    Hydrocodone Hives, Itching and Hallucinations    Latex Itching    Propoxyphene n-acetaminophen Itching    Tuberculin ppd Hives and Itching         Patient Active Problem List   Diagnosis    HTN (hypertension)    Hyperlipidemia    Allergic rhinitis    Hypothyroidism    Migraine headache    Iron deficiency anemia    Insulin resistance    Adjustment disorder with mixed anxiety and depressed mood    Insomnia    Vitamin D deficiency disease    Multiple sclerosis    Morbid obesity with BMI of 45.0-49.9, adult       Medication List with Changes/Refills   Current Medications    ALBUTEROL (PROVENTIL/VENTOLIN HFA) 90 MCG/ACTUATION INHALER    Inhale 1-2 puffs into the lungs every 6 (six) hours as needed for Wheezing (Cough).    ALPRAZOLAM (XANAX) 0.5 MG TABLET    Take 1 tablet (0.5 mg total) by mouth daily as needed for Anxiety.    BENAZEPRIL-HYDROCHLORTHIAZIDE (LOTENSIN HCT) 20-12.5 MG PER TABLET    TAKE TWO TABLETS BY MOUTH ONCE DAILY    BUPROPION (WELLBUTRIN  "XL) 300 MG 24 HR TABLET    Take 300 mg by mouth every morning.    CLINDAMYCIN (CLEOCIN T) 1 % SWAB    Apply topically 2 (two) times daily. For rash under arms    CYCLOBENZAPRINE (FLEXERIL) 10 MG TABLET    Take 10 mg by mouth daily as needed.    ERGOCALCIFEROL (ERGOCALCIFEROL) 50,000 UNIT CAP    Take 1 capsule (50,000 Units total) by mouth once a week.    FEXOFENADINE (ALLEGRA) 180 MG TABLET    Take 180 mg by mouth.    HYDROCHLOROTHIAZIDE (MICROZIDE) 12.5 MG CAPSULE    Take 12.5 mg by mouth.    HYDROQUINONE 4 % CREA    Apply to dark spots once daily    MELOXICAM (MOBIC) 15 MG TABLET    Take 15 mg by mouth daily as needed.    METFORMIN (GLUCOPHAGE-XR) 500 MG 24 HR TABLET    TAKE 1 TABLET BY MOUTH ONCE DAILY WITH DINNER OR EVENING MEAL    METOPROLOL SUCCINATE (TOPROL-XL) 50 MG 24 HR TABLET    Take 1 tablet (50 mg total) by mouth once daily.    PROAIR HFA 90 MCG/ACTUATION INHALER    INHALE 2 PUFFS BY MOUTH EVERY 6 HOURS AS NEEDED FOR WHEEZING    SYNTHROID 100 MCG TABLET    Take 100 mcg by mouth once daily.    TACROLIMUS (PROTOPIC) 0.1 % OINTMENT    Apply topically 2 (two) times daily. Apply to affected area twice daily    ZOLPIDEM (AMBIEN) 5 MG TAB    Take 5 mg by mouth every evening.             Past medical, surgical, family and social histories have been reviewed today.        Objective:     Vitals:    02/11/20 1332   BP: 124/86   Pulse: 80   Temp: 98.4 °F (36.9 °C)   TempSrc: Oral   Weight: 111.8 kg (246 lb 7.6 oz)   PainSc: 0-No pain       Estimated body mass index is 45.4 kg/m² as calculated from the following:    Height as of 12/9/19: 5' 1" (1.549 m).    Weight as of 12/9/19: 109 kg (240 lb 4.8 oz).      Physical Exam   Constitutional: She is oriented to person, place, and time. She appears well-developed and well-nourished.   HENT:   Head: Normocephalic and atraumatic.   Right Ear: Tympanic membrane normal.   Left Ear: Tympanic membrane normal.   Nose: Mucosal edema (boggy) present. No rhinorrhea. "   Mouth/Throat: Oropharynx is clear and moist and mucous membranes are normal. No oropharyngeal exudate, posterior oropharyngeal edema or posterior oropharyngeal erythema.   Eyes: Pupils are equal, round, and reactive to light. Conjunctivae are normal. Right eye exhibits no discharge. Left eye exhibits no discharge.   Cardiovascular: Normal rate and regular rhythm.   Pulmonary/Chest: Effort normal and breath sounds normal. No respiratory distress. She has no wheezes. She has no rales. She exhibits no tenderness.   Musculoskeletal: Normal range of motion. She exhibits no edema.   Lymphadenopathy:     She has no cervical adenopathy.   Neurological: She is alert and oriented to person, place, and time.   Skin: Skin is warm and dry. Capillary refill takes less than 2 seconds. No rash noted.   Psychiatric: She has a normal mood and affect. Her behavior is normal. Judgment and thought content normal.   Vitals reviewed.        Diagnosis       1. Asthma with acute exacerbation, unspecified asthma severity, unspecified whether persistent    2. Allergic rhinitis, unspecified seasonality, unspecified trigger          Assessment/ Plan     Asthma with acute exacerbation, unspecified asthma severity, unspecified whether persistent  -     montelukast (SINGULAIR) 10 mg tablet; Take 1 tablet (10 mg total) by mouth every evening.  Dispense: 30 tablet; Refill: 2  -     budesonide-formoterol 80-4.5 mcg (SYMBICORT) 80-4.5 mcg/actuation HFAA; Inhale 2 puffs into the lungs once daily. Controller  Dispense: 10.2 g; Refill: 2    Allergic rhinitis, unspecified seasonality, unspecified trigger  -     montelukast (SINGULAIR) 10 mg tablet; Take 1 tablet (10 mg total) by mouth every evening.  Dispense: 30 tablet; Refill: 2      Claritin or Zyrtec daily in AM, Singulair PM.  Continue rescue inhaler or nebulizer, added daily maintenance inhaler.  Symptomatic care, rest and fluids.  Follow-up in clinic as needed.        Patient Care Team:  Ana COLMENARES  MD Tri as PCP - General (Internal Medicine)      ELIJAH Serra  [facility]

## 2020-02-24 ENCOUNTER — OFFICE VISIT (OUTPATIENT)
Dept: DERMATOLOGY | Facility: CLINIC | Age: 45
End: 2020-02-24
Payer: COMMERCIAL

## 2020-02-24 DIAGNOSIS — L70.0 ACNE VULGARIS: Primary | ICD-10-CM

## 2020-02-24 DIAGNOSIS — L20.89 OTHER ATOPIC DERMATITIS: ICD-10-CM

## 2020-02-24 PROCEDURE — 99214 OFFICE O/P EST MOD 30 MIN: CPT | Mod: S$GLB,,, | Performed by: DERMATOLOGY

## 2020-02-24 PROCEDURE — 99999 PR PBB SHADOW E&M-EST. PATIENT-LVL II: CPT | Mod: PBBFAC,,, | Performed by: DERMATOLOGY

## 2020-02-24 PROCEDURE — 99214 PR OFFICE/OUTPT VISIT, EST, LEVL IV, 30-39 MIN: ICD-10-PCS | Mod: S$GLB,,, | Performed by: DERMATOLOGY

## 2020-02-24 PROCEDURE — 99999 PR PBB SHADOW E&M-EST. PATIENT-LVL II: ICD-10-PCS | Mod: PBBFAC,,, | Performed by: DERMATOLOGY

## 2020-02-24 RX ORDER — TRETINOIN 0.5 MG/G
CREAM TOPICAL
Qty: 20 G | Refills: 6 | Status: SHIPPED | OUTPATIENT
Start: 2020-02-24 | End: 2021-02-23

## 2020-02-24 RX ORDER — BENZOYL PEROXIDE 100 MG/ML
LIQUID TOPICAL
Qty: 227 G | Refills: 12 | Status: SHIPPED | OUTPATIENT
Start: 2020-02-24 | End: 2020-10-23 | Stop reason: ALTCHOICE

## 2020-02-24 RX ORDER — TACROLIMUS 1 MG/G
OINTMENT TOPICAL 2 TIMES DAILY
Qty: 30 G | Refills: 3 | Status: SHIPPED | OUTPATIENT
Start: 2020-02-24 | End: 2022-08-09 | Stop reason: SDUPTHER

## 2020-02-24 NOTE — PROGRESS NOTES
Subjective:       Patient ID:  Garret Jones is a 44 y.o. female who presents for   Chief Complaint   Patient presents with    Rash     on face and back for 1 month. Pt states it itches and burns,. Pt has used topical creams as a tx     Hx of allergic contact dermatitis of the face and eyelids, last seen on 5/29/19.  Here today for acne.  She c/o increased black heads on the cheeks and near the nose, she currently has used doxy and clinda wipes.     For ACD, she uses  protopic 0.1% ointment, CeraVe        Review of Systems   Constitutional: Negative for fever and chills.   Gastrointestinal: Negative for nausea.   Skin: Positive for activity-related sunscreen use. Negative for itching, rash, daily sunscreen use and recent sunburn.   Hematologic/Lymphatic: Does not bruise/bleed easily.        Objective:    Physical Exam   Constitutional: She appears well-developed and well-nourished. No distress.   Neurological: She is alert and oriented to person, place, and time. She is not disoriented.   Psychiatric: She has a normal mood and affect.   Skin:   Areas Examined (abnormalities noted in diagram):   Head / Face Inspection Performed  Neck Inspection Performed  Chest / Axilla Inspection Performed  Abdomen Inspection Performed  Back Inspection Performed  RUE Inspected  LUE Inspection Performed  Nails and Digits Inspection Performed              Assessment / Plan:        Acne vulgaris  -     benzoyl peroxide (BP WASH) 10 % external wash; Use daily as wash to chest and back. Rinse completely.  May bleach clothing  Dispense: 227 g; Refill: 12  -     tretinoin (RETIN-A) 0.05 % cream; Apply pea-sized amount to entire face at bedtime.  If dryness, use every third night and increase as tolerated to every night.  Dispense: 20 g; Refill: 6  -     Predominantly comedones and enlarged pores.  Will use BP wash for back and tretinoin for the face, discussed possible irritation with AD/eyelid dermatitis on the face.    Other  atopic dermatitis  -     tacrolimus (PROTOPIC) 0.1 % ointment; Apply topically 2 (two) times daily. Apply to affected area twice daily for sensitive skin and rashes  Dispense: 30 g; Refill: 3  -     Lower eyelid dermatitis.             Follow up in about 3 months (around 5/24/2020).

## 2020-03-31 DIAGNOSIS — H00.011 HORDEOLUM EXTERNUM OF RIGHT UPPER EYELID: ICD-10-CM

## 2020-03-31 DIAGNOSIS — J01.90 ACUTE SINUSITIS, RECURRENCE NOT SPECIFIED, UNSPECIFIED LOCATION: ICD-10-CM

## 2020-03-31 RX ORDER — SULFACETAMIDE SODIUM 100 MG/ML
1 SOLUTION/ DROPS OPHTHALMIC
Qty: 15 ML | Refills: 0 | Status: SHIPPED | OUTPATIENT
Start: 2020-03-31 | End: 2020-04-10

## 2020-03-31 RX ORDER — CROMOLYN SODIUM 40 MG/ML
SOLUTION/ DROPS OPHTHALMIC
Qty: 10 ML | Refills: 0 | Status: SHIPPED | OUTPATIENT
Start: 2020-03-31

## 2020-03-31 NOTE — TELEPHONE ENCOUNTER
Patient called and said she would like a refill on her medication .     Last office visit 02/11/2020    No annual

## 2020-03-31 NOTE — TELEPHONE ENCOUNTER
----- Message from Jessica Ruvalcaba sent at 3/31/2020 11:27 AM CDT -----  Contact: self  Pt is requesting a call back regarding allegeries. State that prescription cromolyn and sulfacetamide has   Please call pt back at [phone]

## 2020-05-04 ENCOUNTER — OFFICE VISIT (OUTPATIENT)
Dept: FAMILY MEDICINE | Facility: CLINIC | Age: 45
End: 2020-05-04
Payer: COMMERCIAL

## 2020-05-04 DIAGNOSIS — I10 ESSENTIAL HYPERTENSION: ICD-10-CM

## 2020-05-04 DIAGNOSIS — G35 MULTIPLE SCLEROSIS: Primary | ICD-10-CM

## 2020-05-04 DIAGNOSIS — Z76.0 MEDICATION REFILL: ICD-10-CM

## 2020-05-04 PROCEDURE — 99213 PR OFFICE/OUTPT VISIT, EST, LEVL III, 20-29 MIN: ICD-10-PCS | Mod: 95,,, | Performed by: REGISTERED NURSE

## 2020-05-04 PROCEDURE — 99213 OFFICE O/P EST LOW 20 MIN: CPT | Mod: 95,,, | Performed by: REGISTERED NURSE

## 2020-05-04 RX ORDER — METOPROLOL SUCCINATE 50 MG/1
50 TABLET, EXTENDED RELEASE ORAL DAILY
Qty: 90 TABLET | Refills: 0 | Status: SHIPPED | OUTPATIENT
Start: 2020-05-04 | End: 2020-09-18 | Stop reason: SDUPTHER

## 2020-05-04 RX ORDER — BENAZEPRIL HYDROCHLORIDE AND HYDROCHLOROTHIAZIDE 20; 12.5 MG/1; MG/1
TABLET ORAL
Qty: 180 TABLET | Refills: 0 | Status: SHIPPED | OUTPATIENT
Start: 2020-05-04 | End: 2020-10-07 | Stop reason: SDUPTHER

## 2020-05-04 RX ORDER — HYDROCHLOROTHIAZIDE 12.5 MG/1
12.5 CAPSULE ORAL DAILY
Qty: 90 CAPSULE | Refills: 0 | Status: SHIPPED | OUTPATIENT
Start: 2020-05-04 | End: 2020-10-07 | Stop reason: SDUPTHER

## 2020-05-04 RX ORDER — CYCLOBENZAPRINE HCL 10 MG
10 TABLET ORAL DAILY PRN
Qty: 90 TABLET | Refills: 0 | Status: SHIPPED | OUTPATIENT
Start: 2020-05-04 | End: 2020-10-07 | Stop reason: SDUPTHER

## 2020-05-04 RX ORDER — MINERAL OIL
180 ENEMA (ML) RECTAL DAILY
Qty: 90 TABLET | Refills: 0 | Status: SHIPPED | OUTPATIENT
Start: 2020-05-04 | End: 2020-10-07 | Stop reason: SDUPTHER

## 2020-05-04 RX ORDER — MONTELUKAST SODIUM 10 MG/1
10 TABLET ORAL NIGHTLY
Qty: 90 TABLET | Refills: 0 | Status: SHIPPED | OUTPATIENT
Start: 2020-05-04 | End: 2021-01-05

## 2020-05-04 RX ORDER — MELOXICAM 15 MG/1
15 TABLET ORAL DAILY PRN
Qty: 90 TABLET | Refills: 0 | Status: SHIPPED | OUTPATIENT
Start: 2020-05-04 | End: 2020-10-07 | Stop reason: SDUPTHER

## 2020-05-04 NOTE — PROGRESS NOTES
PRIMARY CARE TELEMEDICINE NOTE      Patient location:  Home  Visit type: Virtual visit with synchronous audio and video  Each patient to whom he or she provides medical services by telemedicine is:  (1) informed of the relationship between the physician and patient and the respective role of any other health care provider with respect to management of the patient  (2) notified that he or she may decline to receive medical services by telemedicine and may withdraw from such care at any time        SUBJECTIVE     Patient ID: Garret Jones is a 44 y.o. female.      CHIEF COMPLAINT:  Discuss work      HPI    Garret has been working from home for the past 5 to 6 weeks due to pandemic.  Working from home w/out any problems.  Now, work has been trying to bring back employees to work in the office before quarantine has been lifted.  Works for the [employer].  She is requesting a letter from me to allow her to continue working from home due to her chronic medical issues.        Review of patient's allergies indicates:   Allergen Reactions    Morphine Itching    Amlodipine      Other reaction(s): Headache  Other reaction(s): Edema    Amoxicillin-pot clavulanate Nausea And Vomiting    Codeine Hives and Itching    Hydrocodone Hives, Itching and Hallucinations    Latex Itching    Propoxyphene n-acetaminophen Itching    Tuberculin ppd Hives and Itching         Patient Active Problem List    Diagnosis Date Noted    Morbid obesity with BMI of 45.0-49.9, adult 12/09/2019    Multiple sclerosis 03/12/2018    Vitamin D deficiency disease 12/20/2017    Insomnia 09/11/2017    Adjustment disorder with mixed anxiety and depressed mood 08/23/2015    HTN (hypertension) 07/10/2013    Hyperlipidemia 07/10/2013    Allergic rhinitis 07/10/2013    Hypothyroidism 07/10/2013    Migraine headache 07/10/2013    Iron deficiency anemia 07/10/2013    Insulin resistance 07/10/2013         Current Outpatient Medications:      albuterol (PROVENTIL/VENTOLIN HFA) 90 mcg/actuation inhaler, Inhale 1-2 puffs into the lungs every 6 (six) hours as needed for Wheezing (Cough)., Disp: 1 Inhaler, Rfl: 1    ALPRAZolam (XANAX) 0.5 MG tablet, Take 1 tablet (0.5 mg total) by mouth daily as needed for Anxiety., Disp: 30 tablet, Rfl: 0    benazepril-hydrochlorthiazide (LOTENSIN HCT) 20-12.5 mg per tablet, TAKE TWO TABLETS BY MOUTH ONCE DAILY, Disp: 180 tablet, Rfl: 1    benzoyl peroxide (BP WASH) 10 % external wash, Use daily as wash to chest and back. Rinse completely.  May bleach clothing, Disp: 227 g, Rfl: 12    budesonide-formoterol 80-4.5 mcg (SYMBICORT) 80-4.5 mcg/actuation HFAA, Inhale 2 puffs into the lungs once daily. Controller, Disp: 10.2 g, Rfl: 2    buPROPion (WELLBUTRIN XL) 300 MG 24 hr tablet, Take 300 mg by mouth every morning., Disp: , Rfl: 5    clindamycin (CLEOCIN T) 1 % Swab, Apply topically 2 (two) times daily. For rash under arms, Disp: 60 each, Rfl: 3    cromolyn (OPTICROM) 4 % ophthalmic solution, 1-2 drops into both eyes 4 times a day, Disp: 10 mL, Rfl: 0    cyclobenzaprine (FLEXERIL) 10 MG tablet, Take 10 mg by mouth daily as needed., Disp: , Rfl:     ergocalciferol (ERGOCALCIFEROL) 50,000 unit Cap, Take 1 capsule (50,000 Units total) by mouth once a week., Disp: 12 capsule, Rfl: 1    fexofenadine (ALLEGRA) 180 MG tablet, Take 180 mg by mouth., Disp: , Rfl:     hydroCHLOROthiazide (MICROZIDE) 12.5 mg capsule, Take 12.5 mg by mouth., Disp: , Rfl:     hydroquinone 4 % Crea, Apply to dark spots once daily, Disp: 28 g, Rfl: 1    meloxicam (MOBIC) 15 MG tablet, Take 15 mg by mouth daily as needed., Disp: , Rfl:     metFORMIN (GLUCOPHAGE-XR) 500 MG 24 hr tablet, TAKE 1 TABLET BY MOUTH ONCE DAILY WITH DINNER OR EVENING MEAL, Disp: , Rfl:     metoprolol succinate (TOPROL-XL) 50 MG 24 hr tablet, Take 1 tablet (50 mg total) by mouth once daily., Disp: 90 tablet, Rfl: 1    montelukast (SINGULAIR) 10 mg tablet, Take 1 tablet  (10 mg total) by mouth every evening., Disp: 30 tablet, Rfl: 2    PROAIR HFA 90 mcg/actuation inhaler, INHALE 2 PUFFS BY MOUTH EVERY 6 HOURS AS NEEDED FOR WHEEZING, Disp: 18 each, Rfl: 0    SYNTHROID 100 mcg tablet, Take 100 mcg by mouth once daily., Disp: , Rfl: 11    tacrolimus (PROTOPIC) 0.1 % ointment, Apply topically 2 (two) times daily. Apply to affected area twice daily for sensitive skin and rashes, Disp: 30 g, Rfl: 3    tretinoin (RETIN-A) 0.05 % cream, Apply pea-sized amount to entire face at bedtime.  If dryness, use every third night and increase as tolerated to every night., Disp: 20 g, Rfl: 6    zolpidem (AMBIEN) 5 MG Tab, Take 5 mg by mouth every evening., Disp: , Rfl: 5      Past Medical History:   Diagnosis Date    Allergy     Anxiety     Diabetes mellitus       x 1 week    Hyperlipidemia     Hypertension     Insomnia     Insomnia     Insulin resistance     Iron deficiency anemia     Multiple sclerosis     Thyroid disease     Hypothyroidism    Vitamin D deficiency        Past Surgical History:   Procedure Laterality Date     SECTION, CLASSIC      times 3    FOOT SURGERY  2010    right foot    HYSTERECTOMY      partial    PARTIAL HYSTERECTOMY         Family History   Problem Relation Age of Onset    Hypertension Mother     Cataracts Mother     Cancer Neg Hx     Diabetes Neg Hx        Social History     Tobacco Use    Smoking status: Never Smoker    Smokeless tobacco: Never Used   Substance Use Topics    Alcohol use: No     Alcohol/week: 0.0 standard drinks     Frequency: Never     Comment: socially    Drug use: No         REVIEW OF SYSTEMS  Review of Systems   Constitutional: Negative.    Respiratory: Negative for cough and shortness of breath.    Cardiovascular: Negative.    Gastrointestinal: Negative.    Genitourinary: Negative.    Musculoskeletal: Negative.    Skin: Negative.    Allergic/Immunologic: Positive for environmental allergies.    Neurological: Negative.           OBJECTIVE:  Physical Exam:  Constitutional:  In NAD, pleasant, cooperative.  Appears well-developed and well-nourished.   Respiratory:  Unlabored, in no resp distress.  Neurological: Alert and oriented to person, place, and time.   Psychiatric: Normal mood and affect, behavior is normal. Judgment and thought content normal.       Complete PE deferred due to video visit      ASSESSMENT       1. Multiple sclerosis    2. Essential hypertension    3. Medication refill           PLAN    Multiple sclerosis ---- stable, followed by Neurology (Dr. Samantha Pierre).    Essential hypertension ---- well-controlled on medication as ordered.    Medication refill  -     meloxicam (MOBIC) 15 MG tablet; Take 1 tablet (15 mg total) by mouth daily as needed.  Dispense: 90 tablet; Refill: 0  -     cyclobenzaprine (FLEXERIL) 10 MG tablet; Take 1 tablet (10 mg total) by mouth daily as needed.  Dispense: 90 tablet; Refill: 0  -     metoprolol succinate (TOPROL-XL) 50 MG 24 hr tablet; Take 1 tablet (50 mg total) by mouth once daily.  Dispense: 90 tablet; Refill: 0  -     benazepril-hydrochlorthiazide (LOTENSIN HCT) 20-12.5 mg per tablet; TAKE TWO TABLETS BY MOUTH ONCE DAILY  Dispense: 180 tablet; Refill: 0  -     fexofenadine (ALLEGRA) 180 MG tablet; Take 1 tablet (180 mg total) by mouth once daily.  Dispense: 90 tablet; Refill: 0  -     hydroCHLOROthiazide (MICROZIDE) 12.5 mg capsule; Take 1 capsule (12.5 mg total) by mouth once daily.  Dispense: 90 capsule; Refill: 0  -     montelukast (SINGULAIR) 10 mg tablet; Take 1 tablet (10 mg total) by mouth every evening.  Dispense: 90 tablet; Refill: 0      Letter provided per request.  See letter tab.      Follow-up:  Return to office prn.          Total time spent with patient was 15 minutes to include discussion, counseling and chart review.  All patient questions were answered at appointment.

## 2020-05-11 NOTE — PROGRESS NOTES
PRIMARY CARE TELEMEDICINE NOTE      Patient location:  Home  Visit type: Virtual visit with synchronous audio and video  Each patient to whom he or she provides medical services by telemedicine is:  (1) informed of the relationship between the physician and patient and the respective role of any other health care provider with respect to management of the patient  (2) notified that he or she may decline to receive medical services by telemedicine and may withdraw from such care at any time        SUBJECTIVE     Patient ID: Garret Jones is a 44 y.o. female.      CHIEF COMPLAINT:  DISCUSS RTW      HPI    Garret currently has a work note/excuse on file dated 5/4/2020.  Basically states that she is to work from home and not in person at office due to high risk for infection.  She wishes to extend this although Phase 1 reopening from pandemic is Friday 5/15/2020.  Still has concerns about working in person.  She has continued to work from home w/out any problems, getting the work done.  Her sister passed away last night from COVID infection.  Governor [place] did state that if anyone is able to work from home, they should be allowed to continue to do so.        Review of patient's allergies indicates:   Allergen Reactions    Morphine Itching    Amlodipine      Other reaction(s): Headache  Other reaction(s): Edema    Amoxicillin-pot clavulanate Nausea And Vomiting    Codeine Hives and Itching    Hydrocodone Hives, Itching and Hallucinations    Latex Itching    Propoxyphene n-acetaminophen Itching    Tuberculin ppd Hives and Itching         Patient Active Problem List    Diagnosis Date Noted    Morbid obesity with BMI of 45.0-49.9, adult 12/09/2019    Multiple sclerosis 03/12/2018    Vitamin D deficiency disease 12/20/2017    Insomnia 09/11/2017    Adjustment disorder with mixed anxiety and depressed mood 08/23/2015    HTN (hypertension) 07/10/2013    Hyperlipidemia 07/10/2013    Allergic  rhinitis 07/10/2013    Hypothyroidism 07/10/2013    Migraine headache 07/10/2013    Iron deficiency anemia 07/10/2013    Insulin resistance 07/10/2013         Current Outpatient Medications:     albuterol (PROVENTIL/VENTOLIN HFA) 90 mcg/actuation inhaler, Inhale 1-2 puffs into the lungs every 6 (six) hours as needed for Wheezing (Cough)., Disp: 1 Inhaler, Rfl: 1    ALPRAZolam (XANAX) 0.5 MG tablet, Take 1 tablet (0.5 mg total) by mouth daily as needed for Anxiety., Disp: 30 tablet, Rfl: 0    benazepril-hydrochlorthiazide (LOTENSIN HCT) 20-12.5 mg per tablet, TAKE TWO TABLETS BY MOUTH ONCE DAILY, Disp: 180 tablet, Rfl: 0    benzoyl peroxide (BP WASH) 10 % external wash, Use daily as wash to chest and back. Rinse completely.  May bleach clothing, Disp: 227 g, Rfl: 12    budesonide-formoterol 80-4.5 mcg (SYMBICORT) 80-4.5 mcg/actuation HFAA, Inhale 2 puffs into the lungs once daily. Controller, Disp: 10.2 g, Rfl: 2    buPROPion (WELLBUTRIN XL) 300 MG 24 hr tablet, Take 300 mg by mouth every morning., Disp: , Rfl: 5    clindamycin (CLEOCIN T) 1 % Swab, Apply topically 2 (two) times daily. For rash under arms, Disp: 60 each, Rfl: 3    cromolyn (OPTICROM) 4 % ophthalmic solution, 1-2 drops into both eyes 4 times a day, Disp: 10 mL, Rfl: 0    cyclobenzaprine (FLEXERIL) 10 MG tablet, Take 1 tablet (10 mg total) by mouth daily as needed., Disp: 90 tablet, Rfl: 0    ergocalciferol (ERGOCALCIFEROL) 50,000 unit Cap, Take 1 capsule (50,000 Units total) by mouth once a week., Disp: 12 capsule, Rfl: 1    fexofenadine (ALLEGRA) 180 MG tablet, Take 1 tablet (180 mg total) by mouth once daily., Disp: 90 tablet, Rfl: 0    hydroCHLOROthiazide (MICROZIDE) 12.5 mg capsule, Take 1 capsule (12.5 mg total) by mouth once daily., Disp: 90 capsule, Rfl: 0    hydroquinone 4 % Crea, Apply to dark spots once daily, Disp: 28 g, Rfl: 1    meloxicam (MOBIC) 15 MG tablet, Take 1 tablet (15 mg total) by mouth daily as needed., Disp:  90 tablet, Rfl: 0    metFORMIN (GLUCOPHAGE-XR) 500 MG 24 hr tablet, TAKE 1 TABLET BY MOUTH ONCE DAILY WITH DINNER OR EVENING MEAL, Disp: , Rfl:     metoprolol succinate (TOPROL-XL) 50 MG 24 hr tablet, Take 1 tablet (50 mg total) by mouth once daily., Disp: 90 tablet, Rfl: 0    montelukast (SINGULAIR) 10 mg tablet, Take 1 tablet (10 mg total) by mouth every evening., Disp: 90 tablet, Rfl: 0    PROAIR HFA 90 mcg/actuation inhaler, INHALE 2 PUFFS BY MOUTH EVERY 6 HOURS AS NEEDED FOR WHEEZING, Disp: 18 each, Rfl: 0    SYNTHROID 100 mcg tablet, Take 100 mcg by mouth once daily., Disp: , Rfl: 11    tacrolimus (PROTOPIC) 0.1 % ointment, Apply topically 2 (two) times daily. Apply to affected area twice daily for sensitive skin and rashes, Disp: 30 g, Rfl: 3    tretinoin (RETIN-A) 0.05 % cream, Apply pea-sized amount to entire face at bedtime.  If dryness, use every third night and increase as tolerated to every night., Disp: 20 g, Rfl: 6    zolpidem (AMBIEN) 5 MG Tab, Take 5 mg by mouth every evening., Disp: , Rfl: 5      Past Medical History:   Diagnosis Date    Allergy     Anxiety     Diabetes mellitus       x 1 week    Hyperlipidemia     Hypertension     Insomnia     Insomnia     Insulin resistance     Iron deficiency anemia     Multiple sclerosis     Thyroid disease     Hypothyroidism    Vitamin D deficiency        Past Surgical History:   Procedure Laterality Date     SECTION, CLASSIC      times 3    FOOT SURGERY      right foot    HYSTERECTOMY      partial    PARTIAL HYSTERECTOMY         Family History   Problem Relation Age of Onset    Hypertension Mother     Cataracts Mother     Cancer Neg Hx     Diabetes Neg Hx        Social History     Tobacco Use    Smoking status: Never Smoker    Smokeless tobacco: Never Used   Substance Use Topics    Alcohol use: No     Alcohol/week: 0.0 standard drinks     Frequency: Never     Comment: socially    Drug use: No          REVIEW OF SYSTEMS  Review of Systems   Constitutional: Positive for fatigue. Negative for activity change and unexpected weight change.   HENT: Negative for hearing loss, rhinorrhea and trouble swallowing.    Eyes: Negative for discharge and visual disturbance.   Respiratory: Negative for chest tightness and wheezing.    Cardiovascular: Negative for chest pain and palpitations.   Gastrointestinal: Negative for blood in stool, constipation, diarrhea and vomiting.   Endocrine: Negative for polydipsia and polyuria.   Genitourinary: Negative for difficulty urinating, dysuria, hematuria and menstrual problem.   Musculoskeletal: Negative for arthralgias, joint swelling and neck pain.   Neurological: Negative for weakness and headaches.   Psychiatric/Behavioral: Positive for agitation (about covid and rtw issues), dysphoric mood (feels down today due to sister's death) and sleep disturbance (did not sleep well last night after getting the bad news her sister passed away). Negative for confusion, decreased concentration (able to get work done from home), self-injury and suicidal ideas. The patient is nervous/anxious (about rtw and covid).           OBJECTIVE:  Physical Exam:  Constitutional:  In NAD, pleasant, cooperative.  Appears well-developed and well-nourished.   Respiratory:  Unlabored, in no resp distress.  Neurological: Alert and oriented to person, place, and time.   Psychiatric: Depressed mood, flat affect.  Judgment and thought content normal.       Complete PE deferred due to video visit      ASSESSMENT       1. At high risk for infection           PLAN    At high risk for infection  Updated the work excuse with a rtw date of 6/1/2020.  She is to continue working from home due to being a high risk for tara a COVID infection.  She will f/u with me beforehand, and this date will be extended if needed.        Follow-up:  Return prn.        Total time spent with patient was 15 minutes to include  discussion, counseling and chart review.  All patient questions were answered at appointment.

## 2020-05-12 ENCOUNTER — OFFICE VISIT (OUTPATIENT)
Dept: FAMILY MEDICINE | Facility: CLINIC | Age: 45
End: 2020-05-12
Payer: COMMERCIAL

## 2020-05-12 DIAGNOSIS — Z91.89 AT HIGH RISK FOR INFECTION: Primary | ICD-10-CM

## 2020-05-12 PROCEDURE — 99213 OFFICE O/P EST LOW 20 MIN: CPT | Mod: 95,,, | Performed by: REGISTERED NURSE

## 2020-05-12 PROCEDURE — 99213 PR OFFICE/OUTPT VISIT, EST, LEVL III, 20-29 MIN: ICD-10-PCS | Mod: 95,,, | Performed by: REGISTERED NURSE

## 2020-09-04 PROBLEM — G47.33 OSA ON CPAP: Status: ACTIVE | Noted: 2020-09-04

## 2020-09-18 DIAGNOSIS — Z76.0 MEDICATION REFILL: ICD-10-CM

## 2020-09-18 RX ORDER — METOPROLOL SUCCINATE 50 MG/1
50 TABLET, EXTENDED RELEASE ORAL DAILY
Qty: 30 TABLET | Refills: 0 | Status: SHIPPED | OUTPATIENT
Start: 2020-09-18 | End: 2020-10-07 | Stop reason: SDUPTHER

## 2020-10-06 NOTE — PROGRESS NOTES
"Subjective:       Patient ID: Garret Jones is a 44 y.o. female.    Chief Complaint   Patient presents with    Follow-up       HPI     Garret is here today for routine follow-up of chronic issues and to get labwork if needed and medication refills.    Hypothyroidism:  Followed by Dr. Lange at  Clinic.  He recently changed her dose of Synthroid to 100 mcg daily but she has yet to start.  Sees him every 6 months.    Vitamin-D:  Lab 3 mos ago = 27, not on supplement.    Asthma:  Req Pro-Air prescription, as she reports the "other ones give me bad headaches".  Has nebulizer at home.  Recent mild wheezing and chest s/s.    Allergies:  Stable, takes Allegra prn.    AUBREY:  Has not used her CPAP in 1 week.  Reports the facemask and nasal tubing does not fit properly.  Followed by Dr. TRACY Servin at  Clinic.    Fatigue:  Reports increased fatigue over the past week or so.  Possible triggers:  Hypothyroidism, AUBREY (not using CPAP).      Review of Systems   Constitutional: Positive for fatigue. Negative for activity change, appetite change, chills, diaphoresis, fever and unexpected weight change.   HENT: Negative.    Eyes: Negative.    Respiratory: Positive for wheezing. Negative for cough, choking, chest tightness and shortness of breath.    Cardiovascular: Negative.    Gastrointestinal: Negative.    Endocrine: Negative for polydipsia, polyphagia and polyuria.   Genitourinary: Negative.    Musculoskeletal: Negative.    Neurological: Negative for tremors, syncope, weakness, light-headedness and headaches.         Review of patient's allergies indicates:   Allergen Reactions    Morphine Itching    Amlodipine      Other reaction(s): Headache  Other reaction(s): Edema    Amoxicillin-pot clavulanate Nausea And Vomiting    Codeine Hives and Itching    Hydrocodone Hives, Itching and Hallucinations    Latex Itching    Propoxyphene n-acetaminophen Itching    Tuberculin ppd Hives and Itching         Patient " Active Problem List   Diagnosis    HTN (hypertension)    Hyperlipidemia    Allergic rhinitis    Hypothyroidism    Migraine headache    Iron deficiency anemia    Insulin resistance    Adjustment disorder with mixed anxiety and depressed mood    Insomnia    Vitamin D deficiency disease    Multiple sclerosis    Morbid obesity with BMI of 45.0-49.9, adult    AUBREY on CPAP         Current Outpatient Medications:     ALPRAZolam (XANAX) 0.5 MG tablet, Take 1 tablet (0.5 mg total) by mouth daily as needed for Anxiety., Disp: 30 tablet, Rfl: 0    benazepril-hydrochlorthiazide (LOTENSIN HCT) 20-12.5 mg per tablet, TAKE TWO TABLETS BY MOUTH ONCE DAILY, Disp: 180 tablet, Rfl: 0    benzoyl peroxide (BP WASH) 10 % external wash, Use daily as wash to chest and back. Rinse completely.  May bleach clothing, Disp: 227 g, Rfl: 12    buPROPion (WELLBUTRIN XL) 300 MG 24 hr tablet, Take 1 tablet (300 mg total) by mouth every morning., Disp: 90 tablet, Rfl: 0    cromolyn (OPTICROM) 4 % ophthalmic solution, 1-2 drops into both eyes 4 times a day, Disp: 10 mL, Rfl: 0    cyclobenzaprine (FLEXERIL) 10 MG tablet, Take 1 tablet (10 mg total) by mouth daily as needed., Disp: 90 tablet, Rfl: 0    fexofenadine (ALLEGRA) 180 MG tablet, Take 1 tablet (180 mg total) by mouth daily as needed., Disp: 90 tablet, Rfl: 0    hydroCHLOROthiazide (MICROZIDE) 12.5 mg capsule, Take 1 capsule (12.5 mg total) by mouth once daily., Disp: 90 capsule, Rfl: 0    hydroquinone 4 % Crea, Apply to dark spots once daily, Disp: 28 g, Rfl: 1    meloxicam (MOBIC) 15 MG tablet, Take 1 tablet (15 mg total) by mouth daily as needed., Disp: 90 tablet, Rfl: 0    metoprolol succinate (TOPROL-XL) 50 MG 24 hr tablet, Take 1 tablet (50 mg total) by mouth once daily., Disp: 90 tablet, Rfl: 0    montelukast (SINGULAIR) 10 mg tablet, Take 1 tablet (10 mg total) by mouth every evening., Disp: 90 tablet, Rfl: 0    PROAIR HFA 90 mcg/actuation inhaler, Inhale 2  "puffs into the lungs every 6 (six) hours as needed. Rescue, Disp: 18 g, Rfl: 3    SYNTHROID 100 mcg tablet, Take 100 mcg by mouth once daily., Disp: , Rfl: 11    tacrolimus (PROTOPIC) 0.1 % ointment, Apply topically 2 (two) times daily. Apply to affected area twice daily for sensitive skin and rashes, Disp: 30 g, Rfl: 3    tretinoin (RETIN-A) 0.05 % cream, Apply pea-sized amount to entire face at bedtime.  If dryness, use every third night and increase as tolerated to every night., Disp: 20 g, Rfl: 6    zolpidem (AMBIEN) 5 MG Tab, Take 1 tablet (5 mg total) by mouth nightly as needed., Disp: 30 tablet, Rfl: 2    clindamycin (CLEOCIN T) 1 % Swab, Apply topically 2 (two) times daily. For rash under arms, Disp: 60 each, Rfl: 3        Past medical, surgical, family and social histories have been reviewed today.      Objective:     Vitals:    10/07/20 0900   BP: 130/80   Pulse: 68   Temp: 98.8 °F (37.1 °C)   TempSrc: Oral   Weight: 113.6 kg (250 lb 7.1 oz)   Height: 5' 1" (1.549 m)   PainSc: 0-No pain         Physical Exam  Vitals signs reviewed.   Constitutional:       General: She is not in acute distress.  HENT:      Head: Normocephalic and atraumatic.   Eyes:      Pupils: Pupils are equal, round, and reactive to light.   Neck:      Musculoskeletal: Normal range of motion and neck supple. No muscular tenderness.      Vascular: No carotid bruit.   Cardiovascular:      Rate and Rhythm: Normal rate and regular rhythm.      Pulses: Normal pulses.      Heart sounds: Normal heart sounds. No murmur. No gallop.    Pulmonary:      Effort: Pulmonary effort is normal.      Breath sounds: Normal breath sounds.   Skin:     General: Skin is warm and dry.      Capillary Refill: Capillary refill takes less than 2 seconds.   Neurological:      General: No focal deficit present.      Mental Status: She is alert and oriented to person, place, and time.      Cranial Nerves: No cranial nerve deficit.      Sensory: No sensory deficit. "      Motor: No weakness.      Coordination: Coordination normal.      Gait: Gait normal.      Deep Tendon Reflexes: Reflexes normal.   Psychiatric:         Mood and Affect: Mood normal.         Behavior: Behavior normal.         Thought Content: Thought content normal.         Judgment: Judgment normal.           Diagnosis       1. Essential hypertension    2. Vitamin D deficiency disease    3. Adjustment disorder with mixed anxiety and depressed mood    4. Insomnia, unspecified type    5. Asthma with acute exacerbation, unspecified asthma severity, unspecified whether persistent    6. Allergic rhinitis, unspecified seasonality, unspecified trigger    7. Musculoskeletal pain    8. Hypothyroidism, unspecified type    9. AUBREY (obstructive sleep apnea)    10. Morbid obesity with BMI of 45.0-49.9, adult          Assessment/ Plan     Essential hypertension ---- stable, controlled.  Med refilled.  -     benazepril-hydrochlorthiazide (LOTENSIN HCT) 20-12.5 mg per tablet; TAKE TWO TABLETS BY MOUTH ONCE DAILY  Dispense: 180 tablet; Refill: 0  -     hydroCHLOROthiazide (MICROZIDE) 12.5 mg capsule; Take 1 capsule (12.5 mg total) by mouth once daily.  Dispense: 90 capsule; Refill: 0  -     metoprolol succinate (TOPROL-XL) 50 MG 24 hr tablet; Take 1 tablet (50 mg total) by mouth once daily.  Dispense: 90 tablet; Refill: 0    Vitamin D deficiency disease ---- get lab update to start on supplementation.  -     Vitamin D; Future; Expected date: 10/07/2020    Adjustment disorder with mixed anxiety and depressed mood --- stable, reports doing better working from home.  -     buPROPion (WELLBUTRIN XL) 300 MG 24 hr tablet; Take 1 tablet (300 mg total) by mouth every morning.  Dispense: 90 tablet; Refill: 0    Insomnia, unspecified type ---- stable, Ambien prn.  -     zolpidem (AMBIEN) 5 MG Tab; Take 1 tablet (5 mg total) by mouth nightly as needed.  Dispense: 30 tablet; Refill: 2    Asthma with acute exacerbation, unspecified asthma  severity, unspecified whether persistent ---- mild s/s, Pro-Air ordered.  Has nebulizer at home.  -     PROAIR HFA 90 mcg/actuation inhaler; Inhale 2 puffs into the lungs every 6 (six) hours as needed. Rescue  Dispense: 18 g; Refill: 3    Allergic rhinitis, unspecified seasonality, unspecified trigger --- currently controlled, Allegra refilled.  -     fexofenadine (ALLEGRA) 180 MG tablet; Take 1 tablet (180 mg total) by mouth daily as needed.  Dispense: 90 tablet; Refill: 0    Musculoskeletal pain ---- no current complaints, takes medication when needed such as increased muscle/joint pain after increased exercise or other.  -     meloxicam (MOBIC) 15 MG tablet; Take 1 tablet (15 mg total) by mouth daily as needed.  Dispense: 90 tablet; Refill: 0  -     cyclobenzaprine (FLEXERIL) 10 MG tablet; Take 1 tablet (10 mg total) by mouth daily as needed.  Dispense: 90 tablet; Refill: 0    Hypothyroidism --- currently followed by Endo, take Synthroid as ordered.    AUBREY --- not on CPAP, advised to discuss with Pulmonary on getting a proper fit on machine.  To discuss with Pulmonary.    Morbid obesity with BMI of 45.0-49.9, adult --- healthy diet, regular exercise, weight reduction.        Follow-up:  Return in 3 months and as needed.        ELIJAH Serra  [facility]       Patient Care Team:  Jed Pinedo NP as PCP - General (General Practice)  Samantha Pierre MD (Neurology)  Cesar Lange MD as Consulting Physician (Endocrinology)  Román Servin MD as Consulting Physician (Pulmonary Disease)

## 2020-10-07 ENCOUNTER — OFFICE VISIT (OUTPATIENT)
Dept: FAMILY MEDICINE | Facility: CLINIC | Age: 45
End: 2020-10-07
Payer: COMMERCIAL

## 2020-10-07 ENCOUNTER — LAB VISIT (OUTPATIENT)
Dept: LAB | Facility: HOSPITAL | Age: 45
End: 2020-10-07
Attending: REGISTERED NURSE
Payer: COMMERCIAL

## 2020-10-07 VITALS
HEIGHT: 61 IN | BODY MASS INDEX: 47.28 KG/M2 | WEIGHT: 250.44 LBS | SYSTOLIC BLOOD PRESSURE: 130 MMHG | HEART RATE: 68 BPM | DIASTOLIC BLOOD PRESSURE: 80 MMHG | TEMPERATURE: 99 F

## 2020-10-07 DIAGNOSIS — G47.33 OSA (OBSTRUCTIVE SLEEP APNEA): ICD-10-CM

## 2020-10-07 DIAGNOSIS — M79.18 MUSCULOSKELETAL PAIN: ICD-10-CM

## 2020-10-07 DIAGNOSIS — E55.9 VITAMIN D DEFICIENCY DISEASE: ICD-10-CM

## 2020-10-07 DIAGNOSIS — J45.901 ASTHMA WITH ACUTE EXACERBATION, UNSPECIFIED ASTHMA SEVERITY, UNSPECIFIED WHETHER PERSISTENT: ICD-10-CM

## 2020-10-07 DIAGNOSIS — J30.9 ALLERGIC RHINITIS, UNSPECIFIED SEASONALITY, UNSPECIFIED TRIGGER: ICD-10-CM

## 2020-10-07 DIAGNOSIS — E03.9 HYPOTHYROIDISM, UNSPECIFIED TYPE: ICD-10-CM

## 2020-10-07 DIAGNOSIS — G47.00 INSOMNIA, UNSPECIFIED TYPE: ICD-10-CM

## 2020-10-07 DIAGNOSIS — F43.23 ADJUSTMENT DISORDER WITH MIXED ANXIETY AND DEPRESSED MOOD: ICD-10-CM

## 2020-10-07 DIAGNOSIS — E66.01 MORBID OBESITY WITH BMI OF 45.0-49.9, ADULT: ICD-10-CM

## 2020-10-07 DIAGNOSIS — I10 ESSENTIAL HYPERTENSION: Primary | ICD-10-CM

## 2020-10-07 LAB — 25(OH)D3+25(OH)D2 SERPL-MCNC: 18 NG/ML (ref 30–96)

## 2020-10-07 PROCEDURE — 99214 OFFICE O/P EST MOD 30 MIN: CPT | Mod: S$GLB,,, | Performed by: REGISTERED NURSE

## 2020-10-07 PROCEDURE — 3008F BODY MASS INDEX DOCD: CPT | Mod: CPTII,S$GLB,, | Performed by: REGISTERED NURSE

## 2020-10-07 PROCEDURE — 3075F SYST BP GE 130 - 139MM HG: CPT | Mod: CPTII,S$GLB,, | Performed by: REGISTERED NURSE

## 2020-10-07 PROCEDURE — 99999 PR PBB SHADOW E&M-EST. PATIENT-LVL III: CPT | Mod: PBBFAC,,, | Performed by: REGISTERED NURSE

## 2020-10-07 PROCEDURE — 3008F PR BODY MASS INDEX (BMI) DOCUMENTED: ICD-10-PCS | Mod: CPTII,S$GLB,, | Performed by: REGISTERED NURSE

## 2020-10-07 PROCEDURE — 3079F DIAST BP 80-89 MM HG: CPT | Mod: CPTII,S$GLB,, | Performed by: REGISTERED NURSE

## 2020-10-07 PROCEDURE — 3075F PR MOST RECENT SYSTOLIC BLOOD PRESS GE 130-139MM HG: ICD-10-PCS | Mod: CPTII,S$GLB,, | Performed by: REGISTERED NURSE

## 2020-10-07 PROCEDURE — 99999 PR PBB SHADOW E&M-EST. PATIENT-LVL III: ICD-10-PCS | Mod: PBBFAC,,, | Performed by: REGISTERED NURSE

## 2020-10-07 PROCEDURE — 36415 COLL VENOUS BLD VENIPUNCTURE: CPT | Mod: PO

## 2020-10-07 PROCEDURE — 82306 VITAMIN D 25 HYDROXY: CPT

## 2020-10-07 PROCEDURE — 3079F PR MOST RECENT DIASTOLIC BLOOD PRESSURE 80-89 MM HG: ICD-10-PCS | Mod: CPTII,S$GLB,, | Performed by: REGISTERED NURSE

## 2020-10-07 PROCEDURE — 99214 PR OFFICE/OUTPT VISIT, EST, LEVL IV, 30-39 MIN: ICD-10-PCS | Mod: S$GLB,,, | Performed by: REGISTERED NURSE

## 2020-10-07 RX ORDER — CYCLOBENZAPRINE HCL 10 MG
10 TABLET ORAL DAILY PRN
Qty: 90 TABLET | Refills: 0 | Status: SHIPPED | OUTPATIENT
Start: 2020-10-07 | End: 2021-11-09

## 2020-10-07 RX ORDER — BENAZEPRIL HYDROCHLORIDE AND HYDROCHLOROTHIAZIDE 20; 12.5 MG/1; MG/1
TABLET ORAL
Qty: 180 TABLET | Refills: 0 | Status: SHIPPED | OUTPATIENT
Start: 2020-10-07 | End: 2021-01-12

## 2020-10-07 RX ORDER — MELOXICAM 15 MG/1
15 TABLET ORAL DAILY PRN
Qty: 90 TABLET | Refills: 0 | Status: SHIPPED | OUTPATIENT
Start: 2020-10-07 | End: 2021-08-18

## 2020-10-07 RX ORDER — HYDROCHLOROTHIAZIDE 12.5 MG/1
12.5 CAPSULE ORAL DAILY
Qty: 90 CAPSULE | Refills: 0 | Status: SHIPPED | OUTPATIENT
Start: 2020-10-07 | End: 2021-05-13

## 2020-10-07 RX ORDER — METOPROLOL SUCCINATE 50 MG/1
50 TABLET, EXTENDED RELEASE ORAL DAILY
Qty: 90 TABLET | Refills: 0 | Status: SHIPPED | OUTPATIENT
Start: 2020-10-07 | End: 2021-01-12

## 2020-10-07 RX ORDER — MINERAL OIL
180 ENEMA (ML) RECTAL DAILY PRN
Qty: 90 TABLET | Refills: 0 | Status: SHIPPED | OUTPATIENT
Start: 2020-10-07 | End: 2021-03-29

## 2020-10-07 RX ORDER — ZOLPIDEM TARTRATE 5 MG/1
5 TABLET ORAL NIGHTLY PRN
Qty: 30 TABLET | Refills: 2 | Status: SHIPPED | OUTPATIENT
Start: 2020-10-07 | End: 2022-03-11 | Stop reason: SDUPTHER

## 2020-10-07 RX ORDER — BUPROPION HYDROCHLORIDE 300 MG/1
300 TABLET ORAL EVERY MORNING
Qty: 90 TABLET | Refills: 0 | Status: SHIPPED | OUTPATIENT
Start: 2020-10-07 | End: 2022-05-02 | Stop reason: SDUPTHER

## 2020-10-07 RX ORDER — ALBUTEROL SULFATE 90 UG/1
2 AEROSOL, METERED RESPIRATORY (INHALATION) EVERY 6 HOURS PRN
Qty: 18 G | Refills: 3 | Status: SHIPPED | OUTPATIENT
Start: 2020-10-07 | End: 2020-12-02

## 2020-10-09 DIAGNOSIS — J45.901 ASTHMA WITH ACUTE EXACERBATION, UNSPECIFIED ASTHMA SEVERITY, UNSPECIFIED WHETHER PERSISTENT: ICD-10-CM

## 2020-10-09 RX ORDER — ALBUTEROL SULFATE 90 UG/1
2 AEROSOL, METERED RESPIRATORY (INHALATION) EVERY 6 HOURS PRN
Qty: 18 G | Refills: 3 | Status: CANCELLED | OUTPATIENT
Start: 2020-10-09

## 2020-10-23 ENCOUNTER — TELEPHONE (OUTPATIENT)
Dept: FAMILY MEDICINE | Facility: CLINIC | Age: 45
End: 2020-10-23

## 2020-10-23 ENCOUNTER — OFFICE VISIT (OUTPATIENT)
Dept: INTERNAL MEDICINE | Facility: CLINIC | Age: 45
End: 2020-10-23
Payer: COMMERCIAL

## 2020-10-23 VITALS
TEMPERATURE: 98 F | BODY MASS INDEX: 47.74 KG/M2 | SYSTOLIC BLOOD PRESSURE: 118 MMHG | OXYGEN SATURATION: 99 % | HEART RATE: 69 BPM | HEIGHT: 61 IN | RESPIRATION RATE: 20 BRPM | WEIGHT: 252.88 LBS | DIASTOLIC BLOOD PRESSURE: 82 MMHG

## 2020-10-23 DIAGNOSIS — R35.0 URINARY FREQUENCY: ICD-10-CM

## 2020-10-23 DIAGNOSIS — J06.9 UPPER RESPIRATORY TRACT INFECTION, UNSPECIFIED TYPE: Primary | ICD-10-CM

## 2020-10-23 LAB
BILIRUB SERPL-MCNC: NEGATIVE MG/DL
BLOOD URINE, POC: NEGATIVE
CLARITY, POC UA: NORMAL
COLOR, POC UA: YELLOW
GLUCOSE UR QL STRIP: NORMAL
KETONES UR QL STRIP: NEGATIVE
LEUKOCYTE ESTERASE URINE, POC: NEGATIVE
NITRITE, POC UA: NEGATIVE
PH, POC UA: 6
PROTEIN, POC: NEGATIVE
SPECIFIC GRAVITY, POC UA: 1.01
UROBILINOGEN, POC UA: NEGATIVE

## 2020-10-23 PROCEDURE — 3079F PR MOST RECENT DIASTOLIC BLOOD PRESSURE 80-89 MM HG: ICD-10-PCS | Mod: CPTII,S$GLB,, | Performed by: NURSE PRACTITIONER

## 2020-10-23 PROCEDURE — 3079F DIAST BP 80-89 MM HG: CPT | Mod: CPTII,S$GLB,, | Performed by: NURSE PRACTITIONER

## 2020-10-23 PROCEDURE — 99214 OFFICE O/P EST MOD 30 MIN: CPT | Mod: 25,S$GLB,, | Performed by: NURSE PRACTITIONER

## 2020-10-23 PROCEDURE — 96372 THER/PROPH/DIAG INJ SC/IM: CPT | Mod: S$GLB,,, | Performed by: NURSE PRACTITIONER

## 2020-10-23 PROCEDURE — 81002 POCT URINE DIPSTICK WITHOUT MICROSCOPE: ICD-10-PCS | Mod: S$GLB,,, | Performed by: NURSE PRACTITIONER

## 2020-10-23 PROCEDURE — 3074F PR MOST RECENT SYSTOLIC BLOOD PRESSURE < 130 MM HG: ICD-10-PCS | Mod: CPTII,S$GLB,, | Performed by: NURSE PRACTITIONER

## 2020-10-23 PROCEDURE — 99214 PR OFFICE/OUTPT VISIT, EST, LEVL IV, 30-39 MIN: ICD-10-PCS | Mod: 25,S$GLB,, | Performed by: NURSE PRACTITIONER

## 2020-10-23 PROCEDURE — 96372 PR INJECTION,THERAP/PROPH/DIAG2ST, IM OR SUBCUT: ICD-10-PCS | Mod: S$GLB,,, | Performed by: NURSE PRACTITIONER

## 2020-10-23 PROCEDURE — 81002 URINALYSIS NONAUTO W/O SCOPE: CPT | Mod: S$GLB,,, | Performed by: NURSE PRACTITIONER

## 2020-10-23 PROCEDURE — 3008F BODY MASS INDEX DOCD: CPT | Mod: CPTII,S$GLB,, | Performed by: NURSE PRACTITIONER

## 2020-10-23 PROCEDURE — 3074F SYST BP LT 130 MM HG: CPT | Mod: CPTII,S$GLB,, | Performed by: NURSE PRACTITIONER

## 2020-10-23 PROCEDURE — 3008F PR BODY MASS INDEX (BMI) DOCUMENTED: ICD-10-PCS | Mod: CPTII,S$GLB,, | Performed by: NURSE PRACTITIONER

## 2020-10-23 PROCEDURE — 99999 PR PBB SHADOW E&M-EST. PATIENT-LVL V: CPT | Mod: PBBFAC,,, | Performed by: NURSE PRACTITIONER

## 2020-10-23 PROCEDURE — 99999 PR PBB SHADOW E&M-EST. PATIENT-LVL V: ICD-10-PCS | Mod: PBBFAC,,, | Performed by: NURSE PRACTITIONER

## 2020-10-23 RX ORDER — DOXYCYCLINE 100 MG/1
100 CAPSULE ORAL 2 TIMES DAILY
Qty: 10 CAPSULE | Refills: 0 | Status: SHIPPED | OUTPATIENT
Start: 2020-10-23 | End: 2020-10-28

## 2020-10-23 RX ORDER — BETAMETHASONE SODIUM PHOSPHATE AND BETAMETHASONE ACETATE 3; 3 MG/ML; MG/ML
6 INJECTION, SUSPENSION INTRA-ARTICULAR; INTRALESIONAL; INTRAMUSCULAR; SOFT TISSUE
Status: COMPLETED | OUTPATIENT
Start: 2020-10-23 | End: 2020-10-23

## 2020-10-23 RX ORDER — CETIRIZINE HYDROCHLORIDE 10 MG/1
10 TABLET ORAL DAILY
Qty: 30 TABLET | Refills: 0 | Status: SHIPPED | OUTPATIENT
Start: 2020-10-23

## 2020-10-23 RX ORDER — PREDNISONE 20 MG/1
20 TABLET ORAL DAILY
Qty: 5 TABLET | Refills: 0 | Status: SHIPPED | OUTPATIENT
Start: 2020-10-23 | End: 2020-10-28

## 2020-10-23 RX ORDER — FLUTICASONE PROPIONATE 50 MCG
2 SPRAY, SUSPENSION (ML) NASAL DAILY
Qty: 16 G | Refills: 0 | Status: SHIPPED | OUTPATIENT
Start: 2020-10-23

## 2020-10-23 RX ADMIN — BETAMETHASONE SODIUM PHOSPHATE AND BETAMETHASONE ACETATE 6 MG: 3; 3 INJECTION, SUSPENSION INTRA-ARTICULAR; INTRALESIONAL; INTRAMUSCULAR; SOFT TISSUE at 12:10

## 2020-10-23 NOTE — PATIENT INSTRUCTIONS

## 2020-10-23 NOTE — TELEPHONE ENCOUNTER
----- Message from Alice Aldridge sent at 10/23/2020  8:00 AM CDT -----  Regarding: same day appt  Contact: patient  Patient has UTI and Laryngitis and would like to be seen but there are no openings, please call her back at [phone]

## 2020-10-23 NOTE — PROGRESS NOTES
Subjective:       Patient ID: Garret Jones is a 44 y.o. female.    Chief Complaint: Hoarse, Sinusitis, and urine frequency    Sinusitis  This is a new problem. The current episode started 1 to 4 weeks ago (8 days). The problem has been waxing and waning since onset. There has been no fever. Associated symptoms include congestion, coughing, headaches, a hoarse voice and sinus pressure. Pertinent negatives include no chills, shortness of breath or sore throat.   Urinary Frequency   This is a new problem. The current episode started in the past 7 days. The problem occurs intermittently. The problem has been waxing and waning. The quality of the pain is described as burning. The pain is at a severity of 0/10. The patient is experiencing no pain. There has been no fever. She is sexually active. There is no history of pyelonephritis. Associated symptoms include frequency and urgency. Pertinent negatives include no chills, discharge, flank pain, hematuria, hesitancy, nausea, vomiting or constipation. She has tried increased fluids for the symptoms. Her past medical history is significant for hypertension. There is no history of catheterization, diabetes insipidus, diabetes mellitus, kidney stones or recurrent UTIs.       Patient Active Problem List   Diagnosis    HTN (hypertension)    Hyperlipidemia    Allergic rhinitis    Hypothyroidism    Migraine headache    Iron deficiency anemia    Insulin resistance    Adjustment disorder with mixed anxiety and depressed mood    Insomnia    Vitamin D deficiency disease    Multiple sclerosis    Morbid obesity with BMI of 45.0-49.9, adult    AUBREY (obstructive sleep apnea)    Asthma with acute exacerbation    Urinary frequency    Upper respiratory tract infection       Family History   Problem Relation Age of Onset    Hypertension Mother     Cataracts Mother     Cancer Neg Hx     Diabetes Neg Hx      Past Surgical History:   Procedure Laterality Date      SECTION, CLASSIC      times 3    FOOT SURGERY      right foot    HYSTERECTOMY      partial    PARTIAL HYSTERECTOMY           Current Outpatient Medications:     ALPRAZolam (XANAX) 0.5 MG tablet, Take 1 tablet (0.5 mg total) by mouth daily as needed for Anxiety., Disp: 30 tablet, Rfl: 0    benazepril-hydrochlorthiazide (LOTENSIN HCT) 20-12.5 mg per tablet, TAKE TWO TABLETS BY MOUTH ONCE DAILY, Disp: 180 tablet, Rfl: 0    buPROPion (WELLBUTRIN XL) 300 MG 24 hr tablet, Take 1 tablet (300 mg total) by mouth every morning., Disp: 90 tablet, Rfl: 0    clindamycin (CLEOCIN T) 1 % Swab, Apply topically 2 (two) times daily. For rash under arms, Disp: 60 each, Rfl: 3    cromolyn (OPTICROM) 4 % ophthalmic solution, 1-2 drops into both eyes 4 times a day, Disp: 10 mL, Rfl: 0    cyclobenzaprine (FLEXERIL) 10 MG tablet, Take 1 tablet (10 mg total) by mouth daily as needed., Disp: 90 tablet, Rfl: 0    fexofenadine (ALLEGRA) 180 MG tablet, Take 1 tablet (180 mg total) by mouth daily as needed., Disp: 90 tablet, Rfl: 0    hydroCHLOROthiazide (MICROZIDE) 12.5 mg capsule, Take 1 capsule (12.5 mg total) by mouth once daily., Disp: 90 capsule, Rfl: 0    hydroquinone 4 % Crea, Apply to dark spots once daily, Disp: 28 g, Rfl: 1    meloxicam (MOBIC) 15 MG tablet, Take 1 tablet (15 mg total) by mouth daily as needed., Disp: 90 tablet, Rfl: 0    metoprolol succinate (TOPROL-XL) 50 MG 24 hr tablet, Take 1 tablet (50 mg total) by mouth once daily., Disp: 90 tablet, Rfl: 0    montelukast (SINGULAIR) 10 mg tablet, Take 1 tablet (10 mg total) by mouth every evening., Disp: 90 tablet, Rfl: 0    PROAIR HFA 90 mcg/actuation inhaler, Inhale 2 puffs into the lungs every 6 (six) hours as needed. Rescue, Disp: 18 g, Rfl: 3    SYNTHROID 100 mcg tablet, Take 100 mcg by mouth once daily., Disp: , Rfl: 11    tacrolimus (PROTOPIC) 0.1 % ointment, Apply topically 2 (two) times daily. Apply to affected area twice daily for  "sensitive skin and rashes, Disp: 30 g, Rfl: 3    tretinoin (RETIN-A) 0.05 % cream, Apply pea-sized amount to entire face at bedtime.  If dryness, use every third night and increase as tolerated to every night., Disp: 20 g, Rfl: 6    zolpidem (AMBIEN) 5 MG Tab, Take 1 tablet (5 mg total) by mouth nightly as needed., Disp: 30 tablet, Rfl: 2    cetirizine (ZYRTEC) 10 MG tablet, Take 1 tablet (10 mg total) by mouth once daily., Disp: 30 tablet, Rfl: 0    doxycycline (MONODOX) 100 MG capsule, Take 1 capsule (100 mg total) by mouth 2 (two) times daily. for 5 days, Disp: 10 capsule, Rfl: 0    fluticasone propionate (FLONASE) 50 mcg/actuation nasal spray, 2 sprays (100 mcg total) by Each Nostril route once daily., Disp: 16 g, Rfl: 0    predniSONE (DELTASONE) 20 MG tablet, Take 1 tablet (20 mg total) by mouth once daily. for 5 days, Disp: 5 tablet, Rfl: 0  No current facility-administered medications for this visit.     Review of Systems   Constitutional: Negative for chills, fatigue and fever.   HENT: Positive for congestion, hoarse voice, postnasal drip, sinus pressure and sinus pain. Negative for rhinorrhea, sore throat and trouble swallowing.    Respiratory: Positive for cough. Negative for shortness of breath.    Cardiovascular: Negative for chest pain and palpitations.   Gastrointestinal: Negative for abdominal pain, constipation, diarrhea, nausea and vomiting.   Endocrine: Negative for polydipsia, polyphagia and polyuria.   Genitourinary: Positive for dysuria, frequency and urgency. Negative for flank pain, hematuria and hesitancy.   Musculoskeletal: Negative for back pain and gait problem.   Neurological: Positive for headaches. Negative for dizziness, tremors, syncope and light-headedness.       Objective:   /82 (BP Location: Left arm, Patient Position: Sitting, BP Method: Large (Automatic))   Pulse 69   Temp 97.7 °F (36.5 °C) (Temporal)   Resp 20   Ht 5' 1" (1.549 m)   Wt 114.7 kg (252 lb 13.9 oz)  "  SpO2 99%   BMI 47.78 kg/m²      Physical Exam  Constitutional:       General: She is not in acute distress.     Appearance: Normal appearance. She is obese. She is not ill-appearing.   HENT:      Head: Normocephalic and atraumatic.      Right Ear: Ear canal normal. A middle ear effusion is present. Tympanic membrane is not erythematous.      Left Ear: Ear canal normal. A middle ear effusion is present. Tympanic membrane is not erythematous.      Nose: Mucosal edema present. No nasal tenderness.      Right Turbinates: Enlarged.      Left Turbinates: Enlarged.      Right Sinus: No maxillary sinus tenderness or frontal sinus tenderness.      Left Sinus: No maxillary sinus tenderness or frontal sinus tenderness.      Mouth/Throat:      Lips: Pink.      Mouth: Mucous membranes are moist.      Pharynx: Oropharynx is clear. Uvula midline. No pharyngeal swelling or posterior oropharyngeal erythema.   Neck:      Musculoskeletal: Normal range of motion and neck supple.   Cardiovascular:      Rate and Rhythm: Normal rate and regular rhythm.      Pulses: Normal pulses.      Heart sounds: Normal heart sounds. No murmur. No friction rub. No gallop.    Pulmonary:      Effort: Pulmonary effort is normal. No respiratory distress.      Breath sounds: Normal breath sounds.   Abdominal:      Palpations: Abdomen is soft.      Tenderness: There is no abdominal tenderness. There is no right CVA tenderness or left CVA tenderness.   Musculoskeletal: Normal range of motion.   Lymphadenopathy:      Cervical: No cervical adenopathy.   Skin:     General: Skin is warm and dry.      Coloration: Skin is not pale.      Findings: No erythema.   Neurological:      General: No focal deficit present.      Mental Status: She is alert and oriented to person, place, and time.         Assessment & Plan     Results for orders placed or performed in visit on 10/23/20   POCT urine dipstick without microscope   Result Value Ref Range    Color, UA Yellow      "Spec Grav UA 1.015     pH, UA 6     WBC, UA negative     Nitrite, UA negative     Protein negative     Glucose, UA normal     Ketones, UA negative     Urobilinogen, UA negative     Bilirubin negative     Blood, UA negative     Clarity, UA Slightly Cloudy        Problem List Items Addressed This Visit        ENT    Upper respiratory tract infection - Primary    Current Assessment & Plan     Tylenol/ibuprofen for pain and fever.   Hand hygiene.   Maintain adequate hydration.   Antihistamine and flonase for nasal congestion and upper respiratory symptoms.   Rest.   Treating with abx since symptoms have been going on for longer than week without any improvement.          Relevant Medications    cetirizine (ZYRTEC) 10 MG tablet    fluticasone propionate (FLONASE) 50 mcg/actuation nasal spray    predniSONE (DELTASONE) 20 MG tablet    betamethasone acetate-betamethasone sodium phosphate injection 6 mg (Completed)    doxycycline (MONODOX) 100 MG capsule       Renal/    Urinary frequency    Current Assessment & Plan     No concern for infection on POCT.   Increase fluids.          Relevant Orders    POCT urine dipstick without microscope (Completed)           Follow up if symptoms worsen or fail to improve.          Portions of this note may have been created with voice recognition software. Occasional "wrong-word" or "sound-a-like" substitutions may have occurred due to the inherent limitations of voice recognition software. Please, read the note carefully and recognize, using context, where substitutions have occurred.       "

## 2020-10-23 NOTE — ASSESSMENT & PLAN NOTE
Tylenol/ibuprofen for pain and fever.   Hand hygiene.   Maintain adequate hydration.   Antihistamine and flonase for nasal congestion and upper respiratory symptoms.   Rest.   Treating with abx since symptoms have been going on for longer than week without any improvement.

## 2020-10-26 ENCOUNTER — TELEPHONE (OUTPATIENT)
Dept: INTERNAL MEDICINE | Facility: CLINIC | Age: 45
End: 2020-10-26

## 2020-10-26 DIAGNOSIS — B37.9 CANDIDA INFECTION: Primary | ICD-10-CM

## 2020-10-26 RX ORDER — FLUCONAZOLE 150 MG/1
150 TABLET ORAL DAILY
Qty: 1 TABLET | Refills: 0 | Status: SHIPPED | OUTPATIENT
Start: 2020-10-26 | End: 2020-10-27

## 2020-10-26 NOTE — TELEPHONE ENCOUNTER
----- Message from Linda Arredondo sent at 10/26/2020  8:11 AM CDT -----  Contact: Garret Arnold called in regards to rx. Wants to know if Diflucan can be called into:    [id number] - [place] - [address]  [address]  [place] [id number]  Phone: [phone] Fax: [phone]    It is due to the issue she came in for on Friday. Any questions please call [phone]. Thanks jh

## 2020-12-02 ENCOUNTER — PATIENT MESSAGE (OUTPATIENT)
Dept: FAMILY MEDICINE | Facility: CLINIC | Age: 45
End: 2020-12-02

## 2020-12-02 ENCOUNTER — TELEPHONE (OUTPATIENT)
Dept: FAMILY MEDICINE | Facility: CLINIC | Age: 45
End: 2020-12-02

## 2020-12-02 DIAGNOSIS — J45.901 ASTHMA WITH ACUTE EXACERBATION, UNSPECIFIED ASTHMA SEVERITY, UNSPECIFIED WHETHER PERSISTENT: Primary | ICD-10-CM

## 2020-12-02 RX ORDER — ALBUTEROL SULFATE 90 UG/1
2 AEROSOL, METERED RESPIRATORY (INHALATION)
Qty: 18 G | Refills: 2 | Status: SHIPPED | OUTPATIENT
Start: 2020-12-02 | End: 2021-01-27

## 2020-12-02 NOTE — TELEPHONE ENCOUNTER
Pt stated her inhaler that her insurance is requesting generic and not the brand name, she is having trouble getting her refi

## 2020-12-02 NOTE — TELEPHONE ENCOUNTER
----- Message from Rosalva Cantor sent at 12/2/2020  9:16 AM CST -----  Regarding: Medication  Contact: Patient  Please call concerning her inhaler, patient's insurance is requesting generic and not the brand name, she is having trouble getting her refill. Please call at Ph .[phone] (home)

## 2020-12-14 ENCOUNTER — PATIENT MESSAGE (OUTPATIENT)
Dept: FAMILY MEDICINE | Facility: CLINIC | Age: 45
End: 2020-12-14

## 2021-01-27 ENCOUNTER — OFFICE VISIT (OUTPATIENT)
Dept: FAMILY MEDICINE | Facility: CLINIC | Age: 46
End: 2021-01-27
Payer: COMMERCIAL

## 2021-01-27 VITALS
WEIGHT: 260.13 LBS | SYSTOLIC BLOOD PRESSURE: 140 MMHG | HEIGHT: 61 IN | DIASTOLIC BLOOD PRESSURE: 80 MMHG | TEMPERATURE: 98 F | BODY MASS INDEX: 49.11 KG/M2 | HEART RATE: 97 BPM

## 2021-01-27 DIAGNOSIS — J45.21 MILD INTERMITTENT ASTHMA WITH ACUTE EXACERBATION: ICD-10-CM

## 2021-01-27 DIAGNOSIS — J30.9 ALLERGIC RHINITIS, UNSPECIFIED SEASONALITY, UNSPECIFIED TRIGGER: Primary | ICD-10-CM

## 2021-01-27 PROBLEM — J06.9 UPPER RESPIRATORY TRACT INFECTION: Status: RESOLVED | Noted: 2020-10-23 | Resolved: 2021-01-27

## 2021-01-27 PROBLEM — R35.0 URINARY FREQUENCY: Status: RESOLVED | Noted: 2020-10-23 | Resolved: 2021-01-27

## 2021-01-27 PROCEDURE — 3079F PR MOST RECENT DIASTOLIC BLOOD PRESSURE 80-89 MM HG: ICD-10-PCS | Mod: CPTII,S$GLB,, | Performed by: REGISTERED NURSE

## 2021-01-27 PROCEDURE — 99999 PR PBB SHADOW E&M-EST. PATIENT-LVL V: CPT | Mod: PBBFAC,,, | Performed by: REGISTERED NURSE

## 2021-01-27 PROCEDURE — 99999 PR PBB SHADOW E&M-EST. PATIENT-LVL V: ICD-10-PCS | Mod: PBBFAC,,, | Performed by: REGISTERED NURSE

## 2021-01-27 PROCEDURE — 3077F PR MOST RECENT SYSTOLIC BLOOD PRESSURE >= 140 MM HG: ICD-10-PCS | Mod: CPTII,S$GLB,, | Performed by: REGISTERED NURSE

## 2021-01-27 PROCEDURE — 1126F PR PAIN SEVERITY QUANTIFIED, NO PAIN PRESENT: ICD-10-PCS | Mod: S$GLB,,, | Performed by: REGISTERED NURSE

## 2021-01-27 PROCEDURE — 99213 OFFICE O/P EST LOW 20 MIN: CPT | Mod: 25,S$GLB,, | Performed by: REGISTERED NURSE

## 2021-01-27 PROCEDURE — 3079F DIAST BP 80-89 MM HG: CPT | Mod: CPTII,S$GLB,, | Performed by: REGISTERED NURSE

## 2021-01-27 PROCEDURE — 3008F PR BODY MASS INDEX (BMI) DOCUMENTED: ICD-10-PCS | Mod: CPTII,S$GLB,, | Performed by: REGISTERED NURSE

## 2021-01-27 PROCEDURE — 1126F AMNT PAIN NOTED NONE PRSNT: CPT | Mod: S$GLB,,, | Performed by: REGISTERED NURSE

## 2021-01-27 PROCEDURE — 3008F BODY MASS INDEX DOCD: CPT | Mod: CPTII,S$GLB,, | Performed by: REGISTERED NURSE

## 2021-01-27 PROCEDURE — 99213 PR OFFICE/OUTPT VISIT, EST, LEVL III, 20-29 MIN: ICD-10-PCS | Mod: 25,S$GLB,, | Performed by: REGISTERED NURSE

## 2021-01-27 PROCEDURE — 3077F SYST BP >= 140 MM HG: CPT | Mod: CPTII,S$GLB,, | Performed by: REGISTERED NURSE

## 2021-01-27 PROCEDURE — 96372 THER/PROPH/DIAG INJ SC/IM: CPT | Mod: S$GLB,,, | Performed by: REGISTERED NURSE

## 2021-01-27 PROCEDURE — 96372 PR INJECTION,THERAP/PROPH/DIAG2ST, IM OR SUBCUT: ICD-10-PCS | Mod: S$GLB,,, | Performed by: REGISTERED NURSE

## 2021-01-27 RX ORDER — PREDNISONE 20 MG/1
TABLET ORAL
Qty: 10 TABLET | Refills: 0 | Status: SHIPPED | OUTPATIENT
Start: 2021-01-27 | End: 2021-05-13

## 2021-01-27 RX ORDER — BETAMETHASONE SODIUM PHOSPHATE AND BETAMETHASONE ACETATE 3; 3 MG/ML; MG/ML
12 INJECTION, SUSPENSION INTRA-ARTICULAR; INTRALESIONAL; INTRAMUSCULAR; SOFT TISSUE
Status: COMPLETED | OUTPATIENT
Start: 2021-01-27 | End: 2021-01-27

## 2021-01-27 RX ORDER — ALBUTEROL SULFATE 90 UG/1
1-2 AEROSOL, METERED RESPIRATORY (INHALATION)
Qty: 18 G | Refills: 0
Start: 2021-01-27 | End: 2021-06-18

## 2021-01-27 RX ORDER — ALBUTEROL SULFATE 90 UG/1
1-2 AEROSOL, METERED RESPIRATORY (INHALATION)
COMMUNITY
End: 2021-01-27 | Stop reason: SDUPTHER

## 2021-01-27 RX ADMIN — BETAMETHASONE SODIUM PHOSPHATE AND BETAMETHASONE ACETATE 12 MG: 3; 3 INJECTION, SUSPENSION INTRA-ARTICULAR; INTRALESIONAL; INTRAMUSCULAR; SOFT TISSUE at 09:01

## 2021-01-28 ENCOUNTER — PATIENT MESSAGE (OUTPATIENT)
Dept: FAMILY MEDICINE | Facility: CLINIC | Age: 46
End: 2021-01-28

## 2021-01-31 RX ORDER — AZELASTINE 1 MG/ML
1 SPRAY, METERED NASAL 2 TIMES DAILY
Qty: 30 ML | Refills: 2 | Status: SHIPPED | OUTPATIENT
Start: 2021-01-31 | End: 2021-11-09

## 2021-02-01 ENCOUNTER — PATIENT OUTREACH (OUTPATIENT)
Dept: ADMINISTRATIVE | Facility: HOSPITAL | Age: 46
End: 2021-02-01

## 2021-02-05 ENCOUNTER — TELEPHONE (OUTPATIENT)
Dept: FAMILY MEDICINE | Facility: CLINIC | Age: 46
End: 2021-02-05

## 2021-02-05 DIAGNOSIS — I10 ESSENTIAL HYPERTENSION: Primary | ICD-10-CM

## 2021-02-08 RX ORDER — ACETAMINOPHEN 500 MG
1 TABLET ORAL DAILY
Qty: 1 EACH | Refills: 0 | Status: SHIPPED | OUTPATIENT
Start: 2021-02-08

## 2021-03-08 ENCOUNTER — PATIENT OUTREACH (OUTPATIENT)
Dept: ADMINISTRATIVE | Facility: HOSPITAL | Age: 46
End: 2021-03-08

## 2021-04-16 ENCOUNTER — PATIENT OUTREACH (OUTPATIENT)
Dept: ADMINISTRATIVE | Facility: HOSPITAL | Age: 46
End: 2021-04-16

## 2021-05-13 ENCOUNTER — OFFICE VISIT (OUTPATIENT)
Dept: FAMILY MEDICINE | Facility: CLINIC | Age: 46
End: 2021-05-13
Payer: COMMERCIAL

## 2021-05-13 VITALS
WEIGHT: 252.63 LBS | DIASTOLIC BLOOD PRESSURE: 78 MMHG | HEART RATE: 79 BPM | TEMPERATURE: 98 F | OXYGEN SATURATION: 99 % | HEIGHT: 61 IN | RESPIRATION RATE: 18 BRPM | SYSTOLIC BLOOD PRESSURE: 118 MMHG | BODY MASS INDEX: 47.7 KG/M2

## 2021-05-13 DIAGNOSIS — R51.9 HEADACHE, UNSPECIFIED HEADACHE TYPE: ICD-10-CM

## 2021-05-13 DIAGNOSIS — J30.9 ALLERGIC RHINITIS, UNSPECIFIED SEASONALITY, UNSPECIFIED TRIGGER: Primary | ICD-10-CM

## 2021-05-13 PROCEDURE — 1126F AMNT PAIN NOTED NONE PRSNT: CPT | Mod: S$GLB,,, | Performed by: REGISTERED NURSE

## 2021-05-13 PROCEDURE — 99213 PR OFFICE/OUTPT VISIT, EST, LEVL III, 20-29 MIN: ICD-10-PCS | Mod: 25,S$GLB,, | Performed by: REGISTERED NURSE

## 2021-05-13 PROCEDURE — 3008F PR BODY MASS INDEX (BMI) DOCUMENTED: ICD-10-PCS | Mod: CPTII,S$GLB,, | Performed by: REGISTERED NURSE

## 2021-05-13 PROCEDURE — 99999 PR PBB SHADOW E&M-EST. PATIENT-LVL V: ICD-10-PCS | Mod: PBBFAC,,, | Performed by: REGISTERED NURSE

## 2021-05-13 PROCEDURE — 96372 PR INJECTION,THERAP/PROPH/DIAG2ST, IM OR SUBCUT: ICD-10-PCS | Mod: S$GLB,,, | Performed by: REGISTERED NURSE

## 2021-05-13 PROCEDURE — 99999 PR PBB SHADOW E&M-EST. PATIENT-LVL V: CPT | Mod: PBBFAC,,, | Performed by: REGISTERED NURSE

## 2021-05-13 PROCEDURE — 99213 OFFICE O/P EST LOW 20 MIN: CPT | Mod: 25,S$GLB,, | Performed by: REGISTERED NURSE

## 2021-05-13 PROCEDURE — 3008F BODY MASS INDEX DOCD: CPT | Mod: CPTII,S$GLB,, | Performed by: REGISTERED NURSE

## 2021-05-13 PROCEDURE — 96372 THER/PROPH/DIAG INJ SC/IM: CPT | Mod: S$GLB,,, | Performed by: REGISTERED NURSE

## 2021-05-13 PROCEDURE — 1126F PR PAIN SEVERITY QUANTIFIED, NO PAIN PRESENT: ICD-10-PCS | Mod: S$GLB,,, | Performed by: REGISTERED NURSE

## 2021-05-13 RX ORDER — METHYLPREDNISOLONE ACETATE 80 MG/ML
80 INJECTION, SUSPENSION INTRA-ARTICULAR; INTRALESIONAL; INTRAMUSCULAR; SOFT TISSUE ONCE
Status: COMPLETED | OUTPATIENT
Start: 2021-05-13 | End: 2021-05-13

## 2021-05-13 RX ADMIN — METHYLPREDNISOLONE ACETATE 80 MG: 80 INJECTION, SUSPENSION INTRA-ARTICULAR; INTRALESIONAL; INTRAMUSCULAR; SOFT TISSUE at 02:05

## 2021-09-03 NOTE — TELEPHONE ENCOUNTER
She would also like a diflucan called in she was recently on clindamycin.   IV discontinued, cath removed intact

## 2021-11-05 ENCOUNTER — TELEPHONE (OUTPATIENT)
Dept: FAMILY MEDICINE | Facility: CLINIC | Age: 46
End: 2021-11-05
Payer: COMMERCIAL

## 2021-11-05 DIAGNOSIS — Z76.0 MEDICATION REFILL: ICD-10-CM

## 2021-11-05 DIAGNOSIS — J45.901 ASTHMA WITH ACUTE EXACERBATION, UNSPECIFIED ASTHMA SEVERITY, UNSPECIFIED WHETHER PERSISTENT: Primary | ICD-10-CM

## 2021-11-05 RX ORDER — PREDNISONE 20 MG/1
TABLET ORAL
Qty: 10 TABLET | Refills: 0 | Status: SHIPPED | OUTPATIENT
Start: 2021-11-05 | End: 2021-12-15

## 2021-11-05 RX ORDER — MONTELUKAST SODIUM 10 MG/1
10 TABLET ORAL NIGHTLY
Qty: 90 TABLET | Refills: 1 | Status: SHIPPED | OUTPATIENT
Start: 2021-11-05

## 2021-11-09 ENCOUNTER — TELEPHONE (OUTPATIENT)
Dept: FAMILY MEDICINE | Facility: CLINIC | Age: 46
End: 2021-11-09
Payer: COMMERCIAL

## 2021-11-09 ENCOUNTER — OFFICE VISIT (OUTPATIENT)
Dept: FAMILY MEDICINE | Facility: CLINIC | Age: 46
End: 2021-11-09
Payer: COMMERCIAL

## 2021-11-09 VITALS
RESPIRATION RATE: 18 BRPM | WEIGHT: 226.06 LBS | HEIGHT: 61 IN | SYSTOLIC BLOOD PRESSURE: 120 MMHG | BODY MASS INDEX: 42.68 KG/M2 | HEART RATE: 73 BPM | TEMPERATURE: 98 F | OXYGEN SATURATION: 99 % | DIASTOLIC BLOOD PRESSURE: 60 MMHG

## 2021-11-09 DIAGNOSIS — I10 ESSENTIAL HYPERTENSION: ICD-10-CM

## 2021-11-09 DIAGNOSIS — J45.901 ASTHMA WITH ACUTE EXACERBATION, UNSPECIFIED ASTHMA SEVERITY, UNSPECIFIED WHETHER PERSISTENT: Primary | ICD-10-CM

## 2021-11-09 PROCEDURE — 96372 THER/PROPH/DIAG INJ SC/IM: CPT | Mod: S$GLB,,, | Performed by: REGISTERED NURSE

## 2021-11-09 PROCEDURE — 1159F PR MEDICATION LIST DOCUMENTED IN MEDICAL RECORD: ICD-10-PCS | Mod: CPTII,S$GLB,, | Performed by: REGISTERED NURSE

## 2021-11-09 PROCEDURE — 4010F ACE/ARB THERAPY RXD/TAKEN: CPT | Mod: CPTII,S$GLB,, | Performed by: REGISTERED NURSE

## 2021-11-09 PROCEDURE — 99214 OFFICE O/P EST MOD 30 MIN: CPT | Mod: 25,S$GLB,, | Performed by: REGISTERED NURSE

## 2021-11-09 PROCEDURE — 99999 PR PBB SHADOW E&M-EST. PATIENT-LVL V: ICD-10-PCS | Mod: PBBFAC,,, | Performed by: REGISTERED NURSE

## 2021-11-09 PROCEDURE — 99214 PR OFFICE/OUTPT VISIT, EST, LEVL IV, 30-39 MIN: ICD-10-PCS | Mod: 25,S$GLB,, | Performed by: REGISTERED NURSE

## 2021-11-09 PROCEDURE — 96372 PR INJECTION,THERAP/PROPH/DIAG2ST, IM OR SUBCUT: ICD-10-PCS | Mod: S$GLB,,, | Performed by: REGISTERED NURSE

## 2021-11-09 PROCEDURE — 3008F BODY MASS INDEX DOCD: CPT | Mod: CPTII,S$GLB,, | Performed by: REGISTERED NURSE

## 2021-11-09 PROCEDURE — 3078F PR MOST RECENT DIASTOLIC BLOOD PRESSURE < 80 MM HG: ICD-10-PCS | Mod: CPTII,S$GLB,, | Performed by: REGISTERED NURSE

## 2021-11-09 PROCEDURE — 4010F PR ACE/ARB THEARPY RXD/TAKEN: ICD-10-PCS | Mod: CPTII,S$GLB,, | Performed by: REGISTERED NURSE

## 2021-11-09 PROCEDURE — 3074F PR MOST RECENT SYSTOLIC BLOOD PRESSURE < 130 MM HG: ICD-10-PCS | Mod: CPTII,S$GLB,, | Performed by: REGISTERED NURSE

## 2021-11-09 PROCEDURE — 1159F MED LIST DOCD IN RCRD: CPT | Mod: CPTII,S$GLB,, | Performed by: REGISTERED NURSE

## 2021-11-09 PROCEDURE — 3008F PR BODY MASS INDEX (BMI) DOCUMENTED: ICD-10-PCS | Mod: CPTII,S$GLB,, | Performed by: REGISTERED NURSE

## 2021-11-09 PROCEDURE — 99999 PR PBB SHADOW E&M-EST. PATIENT-LVL V: CPT | Mod: PBBFAC,,, | Performed by: REGISTERED NURSE

## 2021-11-09 PROCEDURE — 3074F SYST BP LT 130 MM HG: CPT | Mod: CPTII,S$GLB,, | Performed by: REGISTERED NURSE

## 2021-11-09 PROCEDURE — 3078F DIAST BP <80 MM HG: CPT | Mod: CPTII,S$GLB,, | Performed by: REGISTERED NURSE

## 2021-11-09 RX ORDER — NEBULIZER AND COMPRESSOR
EACH MISCELLANEOUS
Qty: 1 EACH | Refills: 0 | Status: SHIPPED | OUTPATIENT
Start: 2021-11-09

## 2021-11-09 RX ORDER — DEXAMETHASONE SODIUM PHOSPHATE 4 MG/ML
8 INJECTION, SOLUTION INTRA-ARTICULAR; INTRALESIONAL; INTRAMUSCULAR; INTRAVENOUS; SOFT TISSUE
Status: COMPLETED | OUTPATIENT
Start: 2021-11-09 | End: 2021-11-09

## 2021-11-09 RX ORDER — BENAZEPRIL HYDROCHLORIDE 10 MG/1
TABLET ORAL
COMMUNITY
End: 2021-11-10

## 2021-11-09 RX ORDER — ALBUTEROL SULFATE 0.63 MG/3ML
0.63 SOLUTION RESPIRATORY (INHALATION)
Qty: 75 ML | Refills: 0 | Status: SHIPPED | OUTPATIENT
Start: 2021-11-09 | End: 2022-03-23 | Stop reason: SDUPTHER

## 2021-11-09 RX ORDER — GABAPENTIN 100 MG/1
CAPSULE ORAL
COMMUNITY
Start: 2021-10-26

## 2021-11-09 RX ORDER — ERGOCALCIFEROL 1.25 MG/1
50000 CAPSULE ORAL
COMMUNITY
Start: 2021-09-01 | End: 2021-12-15 | Stop reason: SDUPTHER

## 2021-11-09 RX ADMIN — DEXAMETHASONE SODIUM PHOSPHATE 8 MG: 4 INJECTION, SOLUTION INTRA-ARTICULAR; INTRALESIONAL; INTRAMUSCULAR; INTRAVENOUS; SOFT TISSUE at 11:11

## 2021-11-10 RX ORDER — BENAZEPRIL HYDROCHLORIDE AND HYDROCHLOROTHIAZIDE 20; 12.5 MG/1; MG/1
2 TABLET ORAL DAILY
Qty: 180 TABLET | Refills: 0 | Status: SHIPPED | OUTPATIENT
Start: 2021-11-10 | End: 2021-12-15 | Stop reason: SDUPTHER

## 2021-12-21 ENCOUNTER — PATIENT MESSAGE (OUTPATIENT)
Dept: NEUROLOGY | Facility: CLINIC | Age: 46
End: 2021-12-21
Payer: COMMERCIAL

## 2022-02-28 ENCOUNTER — PATIENT MESSAGE (OUTPATIENT)
Dept: PSYCHIATRY | Facility: CLINIC | Age: 47
End: 2022-02-28
Payer: COMMERCIAL

## 2022-06-24 ENCOUNTER — PATIENT MESSAGE (OUTPATIENT)
Dept: PSYCHIATRY | Facility: CLINIC | Age: 47
End: 2022-06-24
Payer: COMMERCIAL

## 2022-08-09 ENCOUNTER — OFFICE VISIT (OUTPATIENT)
Dept: FAMILY MEDICINE | Facility: CLINIC | Age: 47
End: 2022-08-09
Payer: COMMERCIAL

## 2022-08-09 VITALS
SYSTOLIC BLOOD PRESSURE: 136 MMHG | TEMPERATURE: 98 F | BODY MASS INDEX: 43.55 KG/M2 | HEIGHT: 61 IN | WEIGHT: 230.69 LBS | HEART RATE: 89 BPM | OXYGEN SATURATION: 99 % | DIASTOLIC BLOOD PRESSURE: 84 MMHG | RESPIRATION RATE: 18 BRPM
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
## 2022-08-09 DIAGNOSIS — L03.90 CELLULITIS, UNSPECIFIED CELLULITIS SITE: ICD-10-CM

## 2022-08-09 DIAGNOSIS — L20.89 OTHER ATOPIC DERMATITIS: ICD-10-CM

## 2022-08-09 PROCEDURE — 99999 PR PBB SHADOW E&M-EST. PATIENT-LVL V: ICD-10-PCS | Mod: PBBFAC,,, | Performed by: FAMILY MEDICINE

## 2022-08-09 PROCEDURE — 1159F MED LIST DOCD IN RCRD: CPT | Mod: CPTII,S$GLB,, | Performed by: FAMILY MEDICINE

## 2022-08-09 PROCEDURE — 3061F PR NEG MICROALBUMINURIA RESULT DOCUMENTED/REVIEW: ICD-10-PCS | Mod: CPTII,S$GLB,, | Performed by: FAMILY MEDICINE

## 2022-08-09 PROCEDURE — 3075F PR MOST RECENT SYSTOLIC BLOOD PRESS GE 130-139MM HG: ICD-10-PCS | Mod: CPTII,S$GLB,, | Performed by: FAMILY MEDICINE

## 2022-08-09 PROCEDURE — 3044F HG A1C LEVEL LT 7.0%: CPT | Mod: CPTII,S$GLB,, | Performed by: FAMILY MEDICINE

## 2022-08-09 PROCEDURE — 4010F ACE/ARB THERAPY RXD/TAKEN: CPT | Mod: CPTII,S$GLB,, | Performed by: FAMILY MEDICINE

## 2022-08-09 PROCEDURE — 3079F DIAST BP 80-89 MM HG: CPT | Mod: CPTII,S$GLB,, | Performed by: FAMILY MEDICINE

## 2022-08-09 PROCEDURE — 3079F PR MOST RECENT DIASTOLIC BLOOD PRESSURE 80-89 MM HG: ICD-10-PCS | Mod: CPTII,S$GLB,, | Performed by: FAMILY MEDICINE

## 2022-08-09 PROCEDURE — 3075F SYST BP GE 130 - 139MM HG: CPT | Mod: CPTII,S$GLB,, | Performed by: FAMILY MEDICINE

## 2022-08-09 PROCEDURE — 99214 OFFICE O/P EST MOD 30 MIN: CPT | Mod: S$GLB,,, | Performed by: FAMILY MEDICINE

## 2022-08-09 PROCEDURE — 3044F PR MOST RECENT HEMOGLOBIN A1C LEVEL <7.0%: ICD-10-PCS | Mod: CPTII,S$GLB,, | Performed by: FAMILY MEDICINE

## 2022-08-09 PROCEDURE — 3008F PR BODY MASS INDEX (BMI) DOCUMENTED: ICD-10-PCS | Mod: CPTII,S$GLB,, | Performed by: FAMILY MEDICINE

## 2022-08-09 PROCEDURE — 1159F PR MEDICATION LIST DOCUMENTED IN MEDICAL RECORD: ICD-10-PCS | Mod: CPTII,S$GLB,, | Performed by: FAMILY MEDICINE

## 2022-08-09 PROCEDURE — 3066F PR DOCUMENTATION OF TREATMENT FOR NEPHROPATHY: ICD-10-PCS | Mod: CPTII,S$GLB,, | Performed by: FAMILY MEDICINE

## 2022-08-09 PROCEDURE — 3008F BODY MASS INDEX DOCD: CPT | Mod: CPTII,S$GLB,, | Performed by: FAMILY MEDICINE

## 2022-08-09 PROCEDURE — 4010F PR ACE/ARB THEARPY RXD/TAKEN: ICD-10-PCS | Mod: CPTII,S$GLB,, | Performed by: FAMILY MEDICINE

## 2022-08-09 PROCEDURE — 99999 PR PBB SHADOW E&M-EST. PATIENT-LVL V: CPT | Mod: PBBFAC,,, | Performed by: FAMILY MEDICINE

## 2022-08-09 PROCEDURE — 99214 PR OFFICE/OUTPT VISIT, EST, LEVL IV, 30-39 MIN: ICD-10-PCS | Mod: S$GLB,,, | Performed by: FAMILY MEDICINE

## 2022-08-09 PROCEDURE — 3061F NEG MICROALBUMINURIA REV: CPT | Mod: CPTII,S$GLB,, | Performed by: FAMILY MEDICINE

## 2022-08-09 PROCEDURE — 3066F NEPHROPATHY DOC TX: CPT | Mod: CPTII,S$GLB,, | Performed by: FAMILY MEDICINE

## 2022-08-09 RX ORDER — TACROLIMUS 1 MG/G
OINTMENT TOPICAL 2 TIMES DAILY
Qty: 30 G | Refills: 3 | Status: SHIPPED | OUTPATIENT
Start: 2022-08-09

## 2022-08-09 RX ORDER — SULFAMETHOXAZOLE AND TRIMETHOPRIM 800; 160 MG/1; MG/1
1 TABLET ORAL 2 TIMES DAILY
Qty: 20 TABLET | Refills: 0 | Status: SHIPPED | OUTPATIENT
Start: 2022-08-09 | End: 2022-08-19

## 2022-08-09 RX ORDER — MUPIROCIN 20 MG/G
OINTMENT TOPICAL 3 TIMES DAILY
Qty: 15 G | Refills: 0 | Status: SHIPPED | OUTPATIENT
Start: 2022-08-09

## 2022-08-09 NOTE — PROGRESS NOTES
Subjective:      Patient ID: Garret Jones is a 46 y.o. female.    Chief Complaint: Urticaria and Insect Bite    HPI    Patient here today for bites. Bites on legs and erythema. Thinks it may be flea related. Only person in the home with this. She was outside with the outside dog and then she noticed it. Itching. She also has history of eczema and would like refill of her topical cream that she was given by the dermatologist. Break out on the back of her neck.    Past Medical History:   Diagnosis Date    Allergy     Anxiety     Diabetes mellitus       x 1 week    Hyperlipidemia     Hypertension     Insomnia     Insomnia     Insulin resistance     Iron deficiency anemia     Multiple sclerosis     Thyroid disease     Hypothyroidism    Vitamin D deficiency        Past Surgical History:   Procedure Laterality Date     SECTION, CLASSIC      times 3    FOOT SURGERY  2010    right foot    HYSTERECTOMY      partial    PARTIAL HYSTERECTOMY         Family History   Problem Relation Age of Onset    Hypertension Mother     Cataracts Mother     Cancer Neg Hx     Diabetes Neg Hx        Social History     Socioeconomic History    Marital status:      Spouse name: Femi    Number of children: 4   Occupational History     Employer: [employer]   Tobacco Use    Smoking status: Never Smoker    Smokeless tobacco: Never Used   Substance and Sexual Activity    Alcohol use: No     Alcohol/week: 0.0 standard drinks     Comment: socially    Drug use: No    Sexual activity: Yes     Birth control/protection: See Surgical Hx     Social Determinants of Health     Financial Resource Strain: Low Risk     Difficulty of Paying Living Expenses: Not hard at all   Food Insecurity: No Food Insecurity    Worried About Running Out of Food in the Last Year: Never true    Ran Out of Food in the Last Year: Never true   Transportation Needs: No Transportation Needs    Lack of Transportation  (Medical): No    Lack of Transportation (Non-Medical): No   Physical Activity: Sufficiently Active    Days of Exercise per Week: 4 days    Minutes of Exercise per Session: 50 min   Stress: No Stress Concern Present    Feeling of Stress : Not at all   Social Connections: Unknown    Frequency of Communication with Friends and Family: More than three times a week    Frequency of Social Gatherings with Friends and Family: Once a week    Active Member of Clubs or Organizations: Yes    Attends Club or Organization Meetings: More than 4 times per year    Marital Status:    Housing Stability: Unknown    Unable to Pay for Housing in the Last Year: No    Unstable Housing in the Last Year: No       Health Maintenance Topics with due status: Not Due       Topic Last Completion Date    Influenza Vaccine 07/21/2012    TETANUS VACCINE 06/02/2015    Colorectal Cancer Screening 03/12/2018    Lipid Panel 06/08/2022    Mammogram 07/11/2022       Medication List with Changes/Refills   New Medications    MUPIROCIN (BACTROBAN) 2 % OINTMENT    Apply topically 3 (three) times daily.    SULFAMETHOXAZOLE-TRIMETHOPRIM 800-160MG (BACTRIM DS) 800-160 MG TAB    Take 1 tablet by mouth 2 (two) times daily. for 10 days   Current Medications    ALBUTEROL (ACCUNEB) 0.63 MG/3 ML NEBU    Take 3 mLs (0.63 mg total) by nebulization every 4 to 6 hours as needed (asthma symptoms). Rescue    ALPRAZOLAM (XANAX) 0.5 MG TABLET    Take 1 tablet (0.5 mg total) by mouth daily as needed for Anxiety.    BENAZEPRIL-HYDROCHLORTHIAZIDE (LOTENSIN HCT) 20-12.5 MG PER TABLET    Take 2 tablets by mouth once daily.    BLOOD PRESSURE MONITOR KIT    1 kit by Misc.(Non-Drug; Combo Route) route once daily.    BUPROPION (WELLBUTRIN XL) 300 MG 24 HR TABLET    Take 1 tablet (300 mg total) by mouth every morning.    CETIRIZINE (ZYRTEC) 10 MG TABLET    Take 1 tablet (10 mg total) by mouth once daily.    CROMOLYN (OPTICROM) 4 % OPHTHALMIC SOLUTION    1-2 drops into  both eyes 4 times a day    DEXCHLORPHEN-PHENYLEPHRINE-DM ORAL    Polytussin DM Take 7 ml (oral) 3 times per day for 7 days [id number] syrup 3 times per day oral 7 days suspended 1-5-10 mg/5 mL    DEXTROMETHORPHN/ACETAMINOPH/CP (CHLORPHENIRAM-DM-ACETAMINOPHEN ORAL)    Coricidin HBP Take No date recorded No form recorded No frequency recorded No route recorded No set duration recorded No set duration amount recorded suspended No dosage strength recorded No dosage strength units of measure recorded    ERGOCALCIFEROL (ERGOCALCIFEROL) 50,000 UNIT CAP    Take 1 capsule (50,000 Units total) by mouth every 7 days.    FEXOFENADINE (ALLEGRA) 180 MG TABLET    Take 1 tablet (180 mg total) by mouth once daily.    FLUTICASONE PROPIONATE (FLONASE) 50 MCG/ACTUATION NASAL SPRAY    2 sprays (100 mcg total) by Each Nostril route once daily.    GABAPENTIN (NEURONTIN) 100 MG CAPSULE        HYDROCHLOROTHIAZIDE (MICROZIDE) 12.5 MG CAPSULE    Take 1 capsule (12.5 mg total) by mouth once daily.    MELOXICAM (MOBIC) 15 MG TABLET    Take 1 tablet (15 mg total) by mouth once daily.    MONTELUKAST (SINGULAIR) 10 MG TABLET    Take 1 tablet (10 mg total) by mouth every evening.    NEBULIZER AND COMPRESSOR YUMIKO    Use as directed    PROAIR HFA 90 MCG/ACTUATION INHALER    INHALE 2 PUFFS BY MOUTH EVERY 6 HOURS AS NEEDED    SYNTHROID 100 MCG TABLET    Take 100 mcg by mouth once daily.    ZOLPIDEM (AMBIEN) 5 MG TAB    Take 1 tablet (5 mg total) by mouth nightly as needed (inosmnia).   Changed and/or Refilled Medications    Modified Medication Previous Medication    TACROLIMUS (PROTOPIC) 0.1 % OINTMENT tacrolimus (PROTOPIC) 0.1 % ointment       Apply topically 2 (two) times daily. Apply to affected area twice daily for sensitive skin and rashes    Apply topically 2 (two) times daily. Apply to affected area twice daily for sensitive skin and rashes       Review of patient's allergies indicates:   Allergen Reactions    Morphine Itching    Amlodipine       Other reaction(s): Headache  Other reaction(s): Edema    Amoxicillin-pot clavulanate Nausea And Vomiting    Codeine Hives and Itching    Hydrocodone Hives, Itching and Hallucinations    Hydrocodone-acetaminophen     Latex Itching    Propoxyphene n-acetaminophen Itching    Tuberculin ppd Hives and Itching       Review of Systems   Constitutional: Negative for fever and malaise/fatigue.   HENT: Negative for congestion.    Eyes: Negative for blurred vision.   Respiratory: Negative for shortness of breath.    Cardiovascular: Negative for chest pain and leg swelling.   Gastrointestinal: Negative for abdominal pain, constipation and diarrhea.   Genitourinary: Negative for dysuria.   Skin: Positive for itching and rash.   Neurological: Negative for headaches.       Objective:     Vitals:    08/09/22 1049   BP: 136/84   Pulse: 89   Resp: 18   Temp: 98.3 °F (36.8 °C)     Body mass index is 43.59 kg/m².    Physical Exam  Skin:            Comments: Erythema and multiple bits on the right lateral leg. Darkening and raised rash on the back of her neck          Assessment and Plan:     Bug bite, initial encounter  -     tacrolimus (PROTOPIC) 0.1 % ointment; Apply topically 2 (two) times daily. Apply to affected area twice daily for sensitive skin and rashes  Dispense: 30 g; Refill: 3  -     sulfamethoxazole-trimethoprim 800-160mg (BACTRIM DS) 800-160 mg Tab; Take 1 tablet by mouth 2 (two) times daily. for 10 days  Dispense: 20 tablet; Refill: 0    Other atopic dermatitis  -     mupirocin (BACTROBAN) 2 % ointment; Apply topically 3 (three) times daily.  Dispense: 15 g; Refill: 0    Cellulitis, unspecified cellulitis site  -     tacrolimus (PROTOPIC) 0.1 % ointment; Apply topically 2 (two) times daily. Apply to affected area twice daily for sensitive skin and rashes  Dispense: 30 g; Refill: 3  -     sulfamethoxazole-trimethoprim 800-160mg (BACTRIM DS) 800-160 mg Tab; Take 1 tablet by mouth 2 (two) times daily. for 10 days   Dispense: 20 tablet; Refill: 0        Follow up if symptoms worsen or fail to improve.

## 2022-10-06 ENCOUNTER — OFFICE VISIT (OUTPATIENT)
Dept: INTERNAL MEDICINE | Facility: CLINIC | Age: 47
End: 2022-10-06
Payer: COMMERCIAL

## 2022-10-06 VITALS
BODY MASS INDEX: 44.45 KG/M2 | OXYGEN SATURATION: 96 % | HEART RATE: 86 BPM | DIASTOLIC BLOOD PRESSURE: 82 MMHG | TEMPERATURE: 98 F | WEIGHT: 235.44 LBS | SYSTOLIC BLOOD PRESSURE: 134 MMHG | HEIGHT: 61 IN

## 2022-10-06 DIAGNOSIS — J01.00 ACUTE NON-RECURRENT MAXILLARY SINUSITIS: Primary | ICD-10-CM

## 2022-10-06 DIAGNOSIS — B37.31 VAGINAL CANDIDIASIS: ICD-10-CM

## 2022-10-06 PROCEDURE — 3061F NEG MICROALBUMINURIA REV: CPT | Mod: CPTII,S$GLB,, | Performed by: NURSE PRACTITIONER

## 2022-10-06 PROCEDURE — 3066F PR DOCUMENTATION OF TREATMENT FOR NEPHROPATHY: ICD-10-PCS | Mod: CPTII,S$GLB,, | Performed by: NURSE PRACTITIONER

## 2022-10-06 PROCEDURE — 99999 PR PBB SHADOW E&M-EST. PATIENT-LVL V: ICD-10-PCS | Mod: PBBFAC,,, | Performed by: NURSE PRACTITIONER

## 2022-10-06 PROCEDURE — 3066F NEPHROPATHY DOC TX: CPT | Mod: CPTII,S$GLB,, | Performed by: NURSE PRACTITIONER

## 2022-10-06 PROCEDURE — 3061F PR NEG MICROALBUMINURIA RESULT DOCUMENTED/REVIEW: ICD-10-PCS | Mod: CPTII,S$GLB,, | Performed by: NURSE PRACTITIONER

## 2022-10-06 PROCEDURE — 99213 PR OFFICE/OUTPT VISIT, EST, LEVL III, 20-29 MIN: ICD-10-PCS | Mod: S$GLB,,, | Performed by: NURSE PRACTITIONER

## 2022-10-06 PROCEDURE — 4010F PR ACE/ARB THEARPY RXD/TAKEN: ICD-10-PCS | Mod: CPTII,S$GLB,, | Performed by: NURSE PRACTITIONER

## 2022-10-06 PROCEDURE — 3075F PR MOST RECENT SYSTOLIC BLOOD PRESS GE 130-139MM HG: ICD-10-PCS | Mod: CPTII,S$GLB,, | Performed by: NURSE PRACTITIONER

## 2022-10-06 PROCEDURE — 3079F DIAST BP 80-89 MM HG: CPT | Mod: CPTII,S$GLB,, | Performed by: NURSE PRACTITIONER

## 2022-10-06 PROCEDURE — 3079F PR MOST RECENT DIASTOLIC BLOOD PRESSURE 80-89 MM HG: ICD-10-PCS | Mod: CPTII,S$GLB,, | Performed by: NURSE PRACTITIONER

## 2022-10-06 PROCEDURE — 4010F ACE/ARB THERAPY RXD/TAKEN: CPT | Mod: CPTII,S$GLB,, | Performed by: NURSE PRACTITIONER

## 2022-10-06 PROCEDURE — 99999 PR PBB SHADOW E&M-EST. PATIENT-LVL V: CPT | Mod: PBBFAC,,, | Performed by: NURSE PRACTITIONER

## 2022-10-06 PROCEDURE — 1159F PR MEDICATION LIST DOCUMENTED IN MEDICAL RECORD: ICD-10-PCS | Mod: CPTII,S$GLB,, | Performed by: NURSE PRACTITIONER

## 2022-10-06 PROCEDURE — 3008F PR BODY MASS INDEX (BMI) DOCUMENTED: ICD-10-PCS | Mod: CPTII,S$GLB,, | Performed by: NURSE PRACTITIONER

## 2022-10-06 PROCEDURE — 3044F PR MOST RECENT HEMOGLOBIN A1C LEVEL <7.0%: ICD-10-PCS | Mod: CPTII,S$GLB,, | Performed by: NURSE PRACTITIONER

## 2022-10-06 PROCEDURE — 99213 OFFICE O/P EST LOW 20 MIN: CPT | Mod: S$GLB,,, | Performed by: NURSE PRACTITIONER

## 2022-10-06 PROCEDURE — 3044F HG A1C LEVEL LT 7.0%: CPT | Mod: CPTII,S$GLB,, | Performed by: NURSE PRACTITIONER

## 2022-10-06 PROCEDURE — 3008F BODY MASS INDEX DOCD: CPT | Mod: CPTII,S$GLB,, | Performed by: NURSE PRACTITIONER

## 2022-10-06 PROCEDURE — 3075F SYST BP GE 130 - 139MM HG: CPT | Mod: CPTII,S$GLB,, | Performed by: NURSE PRACTITIONER

## 2022-10-06 PROCEDURE — 1159F MED LIST DOCD IN RCRD: CPT | Mod: CPTII,S$GLB,, | Performed by: NURSE PRACTITIONER

## 2022-10-06 PROCEDURE — 1160F PR REVIEW ALL MEDS BY PRESCRIBER/CLIN PHARMACIST DOCUMENTED: ICD-10-PCS | Mod: CPTII,S$GLB,, | Performed by: NURSE PRACTITIONER

## 2022-10-06 PROCEDURE — 1160F RVW MEDS BY RX/DR IN RCRD: CPT | Mod: CPTII,S$GLB,, | Performed by: NURSE PRACTITIONER

## 2022-10-06 RX ORDER — DOXYCYCLINE 100 MG/1
100 CAPSULE ORAL 2 TIMES DAILY
Qty: 14 CAPSULE | Refills: 0 | Status: SHIPPED | OUTPATIENT
Start: 2022-10-06 | End: 2022-10-13

## 2022-10-06 RX ORDER — METHYLPREDNISOLONE 4 MG/1
TABLET ORAL
Qty: 21 EACH | Refills: 0 | Status: SHIPPED | OUTPATIENT
Start: 2022-10-06 | End: 2022-10-27

## 2022-10-06 RX ORDER — FLUCONAZOLE 150 MG/1
150 TABLET ORAL DAILY
Qty: 2 TABLET | Refills: 0 | Status: SHIPPED | OUTPATIENT
Start: 2022-10-06 | End: 2022-10-07

## 2022-10-06 NOTE — PROGRESS NOTES
Subjective:       Patient ID: Garret Jones is a 46 y.o. female.    Chief Complaint: Allergies and Nasal Congestion    Mrs. Jones presents to clinic for sinus issues. History of allergic rhinitis. Symptoms started 2 weeks ago. Taking Alahist, Corcidin, Allegra, and Flonase without any relief. Denies CP, SOB, or wheezing.    Sinus Problem  This is a new problem. The current episode started 1 to 4 weeks ago. The problem has been gradually worsening since onset. Maximum temperature: subjective fever. Associated symptoms include congestion, ear pain (right), headaches, a hoarse voice, sinus pressure and a sore throat (scratchy). Pertinent negatives include no chills, coughing, neck pain, shortness of breath, sneezing or swollen glands. Treatments tried: Alahist, Corcidin, Allegra. The treatment provided no relief.     Patient Active Problem List   Diagnosis    HTN (hypertension)    Hyperlipidemia    Allergic rhinitis    Hypothyroidism    Migraine headache    Iron deficiency anemia    Insulin resistance    Adjustment disorder with mixed anxiety and depressed mood    Insomnia    Vitamin D deficiency disease    Multiple sclerosis    Morbid obesity with BMI of 45.0-49.9, adult    AUBREY (obstructive sleep apnea)    Asthma with acute exacerbation       Family History   Problem Relation Age of Onset    Hypertension Mother     Cataracts Mother     Cancer Neg Hx     Diabetes Neg Hx      Past Surgical History:   Procedure Laterality Date     SECTION, CLASSIC      times 3    FOOT SURGERY      right foot    HYSTERECTOMY      partial    PARTIAL HYSTERECTOMY           Current Outpatient Medications:     ALA-HIST IR 2 mg Tab, TAKE 1 TABLET BY MOUTH EVERY 6 HOURS AS NEEDED FOR 7 DAYS, Disp: , Rfl:     albuterol (ACCUNEB) 0.63 mg/3 mL Nebu, Take 3 mLs (0.63 mg total) by nebulization every 4 to 6 hours as needed (asthma symptoms). Rescue, Disp: 120 each, Rfl: 11    ALPRAZolam (XANAX) 0.5 MG tablet, Take 1 tablet  (0.5 mg total) by mouth daily as needed for Anxiety., Disp: 30 tablet, Rfl: 0    benazepril-hydrochlorthiazide (LOTENSIN HCT) 20-12.5 mg per tablet, Take 2 tablets by mouth once daily., Disp: 180 tablet, Rfl: 1    blood pressure monitor Kit, 1 kit by Misc.(Non-Drug; Combo Route) route once daily., Disp: 1 each, Rfl: 0    cetirizine (ZYRTEC) 10 MG tablet, Take 1 tablet (10 mg total) by mouth once daily., Disp: 30 tablet, Rfl: 0    cromolyn (OPTICROM) 4 % ophthalmic solution, 1-2 drops into both eyes 4 times a day, Disp: 10 mL, Rfl: 0    cyclobenzaprine (FLEXERIL) 10 MG tablet, One tab po qhs, Disp: 90 tablet, Rfl: 1    DEXCHLORPHEN-PHENYLEPHRINE-DM ORAL, Polytussin DM Take 7 ml (oral) 3 times per day for 7 days 20210414 syrup 3 times per day oral 7 days suspended 1-5-10 mg/5 mL, Disp: , Rfl:     dextromethorphn/acetaminoph/cp (CHLORPHENIRAM-DM-ACETAMINOPHEN ORAL), Coricidin HBP Take No date recorded No form recorded No frequency recorded No route recorded No set duration recorded No set duration amount recorded suspended No dosage strength recorded No dosage strength units of measure recorded, Disp: , Rfl:     ergocalciferol (ERGOCALCIFEROL) 50,000 unit Cap, Take 1 capsule (50,000 Units total) by mouth every 7 days., Disp: 15 capsule, Rfl: 1    fexofenadine (ALLEGRA) 180 MG tablet, Take 1 tablet (180 mg total) by mouth once daily., Disp: 90 tablet, Rfl: 1    fluticasone propionate (FLONASE) 50 mcg/actuation nasal spray, 2 sprays (100 mcg total) by Each Nostril route once daily., Disp: 16 g, Rfl: 0    gabapentin (NEURONTIN) 100 MG capsule, , Disp: , Rfl:     meloxicam (MOBIC) 15 MG tablet, Take 1 tablet (15 mg total) by mouth once daily., Disp: 90 tablet, Rfl: 1    montelukast (SINGULAIR) 10 mg tablet, Take 1 tablet (10 mg total) by mouth every evening., Disp: 90 tablet, Rfl: 1    mupirocin (BACTROBAN) 2 % ointment, Apply topically 3 (three) times daily., Disp: 15 g, Rfl: 0    nebulizer and compressor Benita, Use as  "directed, Disp: 1 each, Rfl: 0    PROAIR HFA 90 mcg/actuation inhaler, Inhale 2 puffs into the lungs every 6 (six) hours. Rescue, Disp: 18 g, Rfl: 11    SYNTHROID 100 mcg tablet, Take 100 mcg by mouth once daily., Disp: , Rfl: 11    tacrolimus (PROTOPIC) 0.1 % ointment, Apply topically 2 (two) times daily. Apply to affected area twice daily for sensitive skin and rashes, Disp: 30 g, Rfl: 3    zolpidem (AMBIEN) 5 MG Tab, Take 1 tablet (5 mg total) by mouth nightly as needed (inosmnia)., Disp: 30 tablet, Rfl: 3    buPROPion (WELLBUTRIN XL) 300 MG 24 hr tablet, Take 1 tablet (300 mg total) by mouth every morning. (Patient not taking: No sig reported), Disp: 90 tablet, Rfl: 1    doxycycline (VIBRAMYCIN) 100 MG Cap, Take 1 capsule (100 mg total) by mouth 2 (two) times daily. for 7 days, Disp: 14 capsule, Rfl: 0    fluconazole (DIFLUCAN) 150 MG Tab, Take 1 tablet (150 mg total) by mouth once daily. May repeat in 72 hours if needed. for 1 day, Disp: 2 tablet, Rfl: 0    methylPREDNISolone (MEDROL DOSEPACK) 4 mg tablet, use as directed, Disp: 21 each, Rfl: 0    Review of Systems   Constitutional:  Positive for fatigue and fever (subjective). Negative for chills.   HENT:  Positive for congestion, ear pain (right), hoarse voice, rhinorrhea, sinus pressure, sinus pain and sore throat (scratchy). Negative for sneezing.    Respiratory:  Negative for cough, shortness of breath and wheezing.    Cardiovascular:  Negative for chest pain and palpitations.   Gastrointestinal:  Negative for nausea and vomiting.   Musculoskeletal:  Negative for neck pain.   Neurological:  Positive for headaches.     Objective:   /82 (BP Location: Left arm, Patient Position: Sitting, BP Method: Large (Manual))   Pulse 86   Temp 98.1 °F (36.7 °C)   Ht 5' 1" (1.549 m)   Wt 106.8 kg (235 lb 7.2 oz)   SpO2 96%   BMI 44.49 kg/m²      Physical Exam  Vitals reviewed.   Constitutional:       General: She is not in acute distress.     Appearance: " Normal appearance. She is obese. She is ill-appearing (mild).   HENT:      Head: Normocephalic and atraumatic.      Right Ear: Ear canal and external ear normal. A middle ear effusion is present. Tympanic membrane is not erythematous.      Left Ear: Ear canal and external ear normal. A middle ear effusion is present. Tympanic membrane is not erythematous.      Nose: Mucosal edema, congestion and rhinorrhea present.      Right Sinus: Maxillary sinus tenderness present.      Left Sinus: Maxillary sinus tenderness present.      Mouth/Throat:      Pharynx: Posterior oropharyngeal erythema (mild) present. No oropharyngeal exudate.   Eyes:      Extraocular Movements: Extraocular movements intact.      Conjunctiva/sclera: Conjunctivae normal.      Pupils: Pupils are equal, round, and reactive to light.   Cardiovascular:      Rate and Rhythm: Normal rate and regular rhythm.      Heart sounds: Normal heart sounds. No murmur heard.  Pulmonary:      Effort: Pulmonary effort is normal. No respiratory distress.      Breath sounds: Normal breath sounds. No wheezing or rhonchi.   Musculoskeletal:      Cervical back: Normal range of motion and neck supple.   Lymphadenopathy:      Cervical: No cervical adenopathy.   Skin:     General: Skin is warm and dry.   Neurological:      Mental Status: She is alert and oriented to person, place, and time.   Psychiatric:         Mood and Affect: Mood normal.       Assessment & Plan     Problem List Items Addressed This Visit    None  Visit Diagnoses       Acute non-recurrent maxillary sinusitis    -  Primary    Doxycycline and Medrol dose kaycee. Symptomatic care discussed. Rest and stay well hydrate.    Relevant Medications    doxycycline (VIBRAMYCIN) 100 MG Cap    methylPREDNISolone (MEDROL DOSEPACK) 4 mg tablet    Vaginal candidiasis        Frequently develops yeast infection after antibiotic use. Diflucan prophylaxis    Relevant Medications    fluconazole (DIFLUCAN) 150 MG Tab       "        Shama Resendiz, JESSICA-C      Portions of this note may have been created with voice recognition software. Occasional "wrong-word" or "sound-a-like" substitutions may have occurred due to the inherent limitations of voice recognition software. Please, read the note carefully and recognize, using context, where substitutions have occurred.       "

## 2022-10-06 NOTE — PATIENT INSTRUCTIONS
Start doxycycline and medrol dose kaycee  Over-the-counter Tylenol/ibuprofen as needed for pain and fever if not contraindicated   Hand hygiene   Rest and maintain adequate hydration.   Antihistamine and flonase for nasal congestion and upper respiratory symptoms  Warm salt water gargles, chloraseptic spray, and lozenges as needed for sore throat.  Notify clinic if symptoms do not improve or worsen.

## 2022-12-01 ENCOUNTER — PATIENT MESSAGE (OUTPATIENT)
Dept: PSYCHIATRY | Facility: CLINIC | Age: 47
End: 2022-12-01
Payer: COMMERCIAL

## 2023-02-20 ENCOUNTER — PATIENT MESSAGE (OUTPATIENT)
Dept: PSYCHIATRY | Facility: CLINIC | Age: 48
End: 2023-02-20
Payer: COMMERCIAL

## 2023-03-15 PROBLEM — F32.1 CURRENT MODERATE EPISODE OF MAJOR DEPRESSIVE DISORDER WITHOUT PRIOR EPISODE: Status: ACTIVE | Noted: 2023-03-15

## 2023-06-28 ENCOUNTER — HOSPITAL ENCOUNTER (OUTPATIENT)
Dept: PREADMISSION TESTING | Facility: HOSPITAL | Age: 48
Discharge: HOME | End: 2023-06-28
Attending: INTERNAL MEDICINE
Payer: COMMERCIAL

## 2023-06-28 DIAGNOSIS — Z12.11 SCREENING FOR COLON CANCER: Primary | ICD-10-CM

## 2023-06-28 RX ORDER — POLYETHYLENE GLYCOL 3350, SODIUM SULFATE ANHYDROUS, SODIUM BICARBONATE, SODIUM CHLORIDE, POTASSIUM CHLORIDE 236; 22.74; 6.74; 5.86; 2.97 G/4L; G/4L; G/4L; G/4L; G/4L
4 POWDER, FOR SOLUTION ORAL ONCE
Qty: 4000 ML | Refills: 0 | Status: SHIPPED | OUTPATIENT
Start: 2023-06-28 | End: 2023-06-28

## 2023-07-12 ENCOUNTER — TELEPHONE (OUTPATIENT)
Dept: PREADMISSION TESTING | Facility: HOSPITAL | Age: 48
End: 2023-07-12
Payer: COMMERCIAL

## 2023-07-12 NOTE — TELEPHONE ENCOUNTER
----- Message from Jocelyne Wiley sent at 7/12/2023  1:19 PM CDT -----  States she is schedule on Wednesday for a Colonoscopy. She would like to cancel it, because she wants to see Dr Joseph first.  Please call pt [phone]. Thank you

## 2023-07-21 ENCOUNTER — PATIENT MESSAGE (OUTPATIENT)
Dept: PSYCHIATRY | Facility: CLINIC | Age: 48
End: 2023-07-21
Payer: COMMERCIAL

## 2023-08-08 ENCOUNTER — TELEPHONE (OUTPATIENT)
Dept: GASTROENTEROLOGY | Facility: CLINIC | Age: 48
End: 2023-08-08
Payer: COMMERCIAL

## 2023-08-08 NOTE — TELEPHONE ENCOUNTER
----- Message from Cole Carreon LPN sent at 8/8/2023 11:14 AM CDT -----  This is a [facility] patient returning your call to reschedule  ----- Message -----  From: Nga Ramirez  Sent: 8/8/2023  11:11 AM CDT  To: Jake PURDY Staff    Type:  Patient Returning Call    Who Called:pt  Who Left Message for Patient:nurse  Does the patient know what this is regarding?:,issed call   Would the patient rather a call back or a response via [facility]? call  Best Call Back Number:[phone]  Additional Information:

## 2023-08-08 NOTE — TELEPHONE ENCOUNTER
Returned call and rescheduled patient to 08/15 at 3:30pm. She verbalized understanding and accepted appointment.

## 2023-08-15 ENCOUNTER — OFFICE VISIT (OUTPATIENT)
Dept: GASTROENTEROLOGY | Facility: CLINIC | Age: 48
End: 2023-08-15
Payer: COMMERCIAL

## 2023-08-15 DIAGNOSIS — K63.5 POLYP OF COLON, UNSPECIFIED PART OF COLON, UNSPECIFIED TYPE: ICD-10-CM

## 2023-08-15 PROCEDURE — 1159F MED LIST DOCD IN RCRD: CPT | Mod: 95,CPTII,, | Performed by: INTERNAL MEDICINE

## 2023-08-15 PROCEDURE — 1159F PR MEDICATION LIST DOCUMENTED IN MEDICAL RECORD: ICD-10-PCS | Mod: 95,CPTII,, | Performed by: INTERNAL MEDICINE

## 2023-08-15 PROCEDURE — 3066F NEPHROPATHY DOC TX: CPT | Mod: 95,CPTII,, | Performed by: INTERNAL MEDICINE

## 2023-08-15 PROCEDURE — 1160F RVW MEDS BY RX/DR IN RCRD: CPT | Mod: 95,CPTII,, | Performed by: INTERNAL MEDICINE

## 2023-08-15 PROCEDURE — 4010F PR ACE/ARB THEARPY RXD/TAKEN: ICD-10-PCS | Mod: 95,CPTII,, | Performed by: INTERNAL MEDICINE

## 2023-08-15 PROCEDURE — 1160F PR REVIEW ALL MEDS BY PRESCRIBER/CLIN PHARMACIST DOCUMENTED: ICD-10-PCS | Mod: 95,CPTII,, | Performed by: INTERNAL MEDICINE

## 2023-08-15 PROCEDURE — 99203 PR OFFICE/OUTPT VISIT, NEW, LEVL III, 30-44 MIN: ICD-10-PCS | Mod: 95,,, | Performed by: INTERNAL MEDICINE

## 2023-08-15 PROCEDURE — 4010F ACE/ARB THERAPY RXD/TAKEN: CPT | Mod: 95,CPTII,, | Performed by: INTERNAL MEDICINE

## 2023-08-15 PROCEDURE — 3061F NEG MICROALBUMINURIA REV: CPT | Mod: 95,CPTII,, | Performed by: INTERNAL MEDICINE

## 2023-08-15 PROCEDURE — 3044F HG A1C LEVEL LT 7.0%: CPT | Mod: 95,CPTII,, | Performed by: INTERNAL MEDICINE

## 2023-08-15 PROCEDURE — 99203 OFFICE O/P NEW LOW 30 MIN: CPT | Mod: 95,,, | Performed by: INTERNAL MEDICINE

## 2023-08-15 PROCEDURE — 3066F PR DOCUMENTATION OF TREATMENT FOR NEPHROPATHY: ICD-10-PCS | Mod: 95,CPTII,, | Performed by: INTERNAL MEDICINE

## 2023-08-15 PROCEDURE — 3044F PR MOST RECENT HEMOGLOBIN A1C LEVEL <7.0%: ICD-10-PCS | Mod: 95,CPTII,, | Performed by: INTERNAL MEDICINE

## 2023-08-15 PROCEDURE — 3061F PR NEG MICROALBUMINURIA RESULT DOCUMENTED/REVIEW: ICD-10-PCS | Mod: 95,CPTII,, | Performed by: INTERNAL MEDICINE

## 2023-08-15 NOTE — PROGRESS NOTES
Clinic Consult:  [facility] Consultation Note    Reason for Consult:  The encounter diagnosis was Polyp of colon, unspecified part of colon, unspecified type.    PCP: Ana Bartlett   No address on file    20 minutes of total time spent on the encounter, which includes face to face time and non-face to face time preparing to see the patient (eg, review of tests), Obtaining and/or reviewing separately obtained history, Documenting clinical information in the electronic or other health record, Independently interpreting results (not separately reported) and communicating results to the patient/family/caregiver, or Care coordination (not separately reported).   Each patient to whom he or she provides medical services by telemedicine is: (1) informed of the relationship between the physician and patient and the respective role of any other health care provider with respect to management of the patient; and (2) notified that he or she may decline to receive medical services by telemedicine and may withdraw from such care at any time.        HPI:  This is a 47 y.o. female here for questions for colonoscopy.   She wants to know why her PCP wants to set up colonoscopy.   She had a colonoscopy 9 years ago with diverticulitis and five polyps.   Unsure when the doctor told her to come back.       ROS:  As per HPI       Medical History:   Past Medical History:   Diagnosis Date    Allergy     Anxiety     Diabetes mellitus       x 1 week    Hyperlipidemia     Hypertension     Insomnia     Insomnia     Insulin resistance     Iron deficiency anemia     Multiple sclerosis     Thyroid disease     Hypothyroidism    Vitamin D deficiency        Surgical History:  Past Surgical History:   Procedure Laterality Date     SECTION, CLASSIC      times 3    FOOT SURGERY      right foot    HYSTERECTOMY      partial    PARTIAL HYSTERECTOMY         Family History:   Family History   Problem Relation Age  of Onset    Hypertension Mother     Cataracts Mother     Cancer Neg Hx     Diabetes Neg Hx        Social History:   Social History     Tobacco Use    Smoking status: Never    Smokeless tobacco: Never   Substance Use Topics    Alcohol use: No     Alcohol/week: 0.0 standard drinks of alcohol     Comment: socially    Drug use: No       Allergies: Reviewed    Home Medications:   Medication List with Changes/Refills   Current Medications    ALA-HIST IR 2 MG TAB    TAKE 1 TABLET BY MOUTH EVERY 6 HOURS AS NEEDED FOR 7 DAYS    ALBUTEROL (PROVENTIL/VENTOLIN HFA) 90 MCG/ACTUATION INHALER    INHALE 2 PUFFS BY MOUTH EVERY 6 HOURS    ALLERGY RELIEF, FEXOFENADINE, 180 MG TABLET    Take 1 tablet by mouth once daily    ALPRAZOLAM (XANAX) 0.5 MG TABLET    Take 1 tablet (0.5 mg total) by mouth daily as needed for Anxiety.    BENAZEPRIL-HYDROCHLORTHIAZIDE (LOTENSIN HCT) 20-12.5 MG PER TABLET    Take 2 tablets by mouth once daily.    BLOOD PRESSURE MONITOR KIT    1 kit by Misc.(Non-Drug; Combo Route) route once daily.    BUPROPION (WELLBUTRIN XL) 300 MG 24 HR TABLET    Take 1 tablet (300 mg total) by mouth every morning.    CETIRIZINE (ZYRTEC) 10 MG TABLET    Take 1 tablet (10 mg total) by mouth once daily.    CROMOLYN (OPTICROM) 4 % OPHTHALMIC SOLUTION    1-2 drops into both eyes 4 times a day    CYCLOBENZAPRINE (FLEXERIL) 10 MG TABLET    One tab po qhs    DEXCHLORPHEN-PHENYLEPHRINE-DM ORAL    Polytussin DM Take 7 ml (oral) 3 times per day for 7 days [id number] syrup 3 times per day oral 7 days suspended 1-5-10 mg/5 mL    DEXTROMETHORPHN/ACETAMINOPH/CP (CHLORPHENIRAM-DM-ACETAMINOPHEN ORAL)    Coricidin HBP Take No date recorded No form recorded No frequency recorded No route recorded No set duration recorded No set duration amount recorded suspended No dosage strength recorded No dosage strength units of measure recorded    ERGOCALCIFEROL (ERGOCALCIFEROL) 50,000 UNIT CAP    Take 1 capsule (50,000 Units total) by mouth every 7 days.     ESTRADIOL (ESTRACE) 0.5 MG TABLET    Take 0.5 mg by mouth.    FLUTICASONE PROPIONATE (FLONASE) 50 MCG/ACTUATION NASAL SPRAY    2 sprays (100 mcg total) by Each Nostril route once daily.    GABAPENTIN (NEURONTIN) 100 MG CAPSULE        HYDROCHLOROTHIAZIDE (MICROZIDE) 12.5 MG CAPSULE    Take 1 capsule (12.5 mg total) by mouth once daily.    KETOROLAC (TORADOL) 10 MG TABLET    Take 10 mg by mouth every 6 (six) hours as needed.    LEVOTHYROXINE (SYNTHROID) 150 MCG TABLET    Take 1 tablet (150 mcg total) by mouth before breakfast.    MELOXICAM (MOBIC) 15 MG TABLET    Take 1 tablet (15 mg total) by mouth once daily.    METFORMIN (GLUCOPHAGE) 1000 MG TABLET    Take 1 tablet (1,000 mg total) by mouth 2 (two) times daily with meals.    MONTELUKAST (SINGULAIR) 10 MG TABLET    Take 1 tablet (10 mg total) by mouth every evening.    MUPIROCIN (BACTROBAN) 2 % OINTMENT    Apply topically 3 (three) times daily.    NEBULIZER AND COMPRESSOR YUMIKO    Use as directed    TACROLIMUS (PROTOPIC) 0.1 % OINTMENT    Apply topically 2 (two) times daily. Apply to affected area twice daily for sensitive skin and rashes    TOPIRAMATE (TOPAMAX) 25 MG TABLET    Take 1 tablet (25 mg total) by mouth 2 (two) times daily.    ZOLPIDEM (AMBIEN) 5 MG TAB    Take 1 tablet (5 mg total) by mouth nightly as needed (insomnia).         Physical Exam:  Vital Signs:  There were no vitals taken for this visit.  There is no height or weight on file to calculate BMI.    Physical Exam  Vitals reviewed: virtual visit.          Labs: Pertinent labs reviewed.        Assessment:  Problem List Items Addressed This Visit          GI    Polyp of colon    Current Assessment & Plan     -Will schedule colonoscopy at this time   -Denies family history of colon cancer   -She is not on anticoagulation.           Polyp of colon, unspecified part of colon, unspecified type                Order summary:  No orders of the defined types were placed in this encounter.      Thank you so  much for allowing me to participate in the care of Garret Joseph MD  Gastroenterology and Hepatology  [facility]

## 2023-08-15 NOTE — ASSESSMENT & PLAN NOTE
-Will schedule colonoscopy at this time   -Denies family history of colon cancer   -She is not on anticoagulation.

## 2023-12-06 ENCOUNTER — TELEPHONE (OUTPATIENT)
Dept: GASTROENTEROLOGY | Facility: CLINIC | Age: 48
End: 2023-12-06
Payer: COMMERCIAL

## 2023-12-06 NOTE — TELEPHONE ENCOUNTER
----- Message from Aleksandr Cruz sent at 12/6/2023  4:32 PM CST -----  Contact: Garret Gutiérrez is requesting that orders be sent for a colonoscopy. Please call patient at .[phone] .

## 2023-12-07 ENCOUNTER — HOSPITAL ENCOUNTER (OUTPATIENT)
Dept: PREADMISSION TESTING | Facility: HOSPITAL | Age: 48
Discharge: HOME | End: 2023-12-07
Attending: INTERNAL MEDICINE
Payer: COMMERCIAL

## 2023-12-07 VITALS — HEIGHT: 61 IN | BODY MASS INDEX: 47.58 KG/M2 | WEIGHT: 252 LBS

## 2023-12-07 DIAGNOSIS — K63.5 POLYP OF COLON, UNSPECIFIED PART OF COLON, UNSPECIFIED TYPE: Primary | ICD-10-CM

## 2023-12-07 RX ORDER — SODIUM, POTASSIUM,MAG SULFATES 17.5-3.13G
1 SOLUTION, RECONSTITUTED, ORAL ORAL DAILY
Qty: 1 KIT | Refills: 0 | Status: SHIPPED | OUTPATIENT
Start: 2023-12-07 | End: 2023-12-09

## 2024-01-22 ENCOUNTER — PATIENT MESSAGE (OUTPATIENT)
Dept: PSYCHIATRY | Facility: CLINIC | Age: 49
End: 2024-01-22
Payer: COMMERCIAL

## 2024-03-26 ENCOUNTER — PATIENT MESSAGE (OUTPATIENT)
Dept: PSYCHIATRY | Facility: CLINIC | Age: 49
End: 2024-03-26
Payer: COMMERCIAL

## 2024-05-02 ENCOUNTER — PATIENT MESSAGE (OUTPATIENT)
Dept: PSYCHIATRY | Facility: CLINIC | Age: 49
End: 2024-05-02
Payer: COMMERCIAL

## 2024-07-25 ENCOUNTER — PATIENT MESSAGE (OUTPATIENT)
Dept: PSYCHIATRY | Facility: CLINIC | Age: 49
End: 2024-07-25
Payer: COMMERCIAL

## 2024-08-05 ENCOUNTER — PATIENT MESSAGE (OUTPATIENT)
Dept: PSYCHIATRY | Facility: CLINIC | Age: 49
End: 2024-08-05
Payer: COMMERCIAL

## 2024-08-27 PROBLEM — M46.1 SACROILIITIS, NOT ELSEWHERE CLASSIFIED: Status: ACTIVE | Noted: 2024-08-27

## 2024-10-04 ENCOUNTER — TELEPHONE (OUTPATIENT)
Dept: GASTROENTEROLOGY | Facility: CLINIC | Age: 49
End: 2024-10-04
Payer: COMMERCIAL

## 2024-10-04 NOTE — TELEPHONE ENCOUNTER
----- Message from Terri sent at 10/4/2024 10:48 AM CDT -----  Contact: [phone]  [email or web address]     Patient is calling for Medical Advice regarding:speak to the office     How long has patient had these symptoms:    Pharmacy name and phone#:    Patient wants a call back or thru [facility]:call back     Comments:  In regards to a colonoscopy   Please advise patient replies from provider may take up to 48 hours.

## 2024-10-04 NOTE — TELEPHONE ENCOUNTER
Returned call to patient who stated she is ready to schedule her colonoscopy. It is ordered as screening only so a message was sent to Dr Ana Bartlett to put in the referral.

## 2024-12-16 ENCOUNTER — PATIENT MESSAGE (OUTPATIENT)
Dept: PSYCHIATRY | Facility: CLINIC | Age: 49
End: 2024-12-16
Payer: COMMERCIAL